# Patient Record
Sex: FEMALE | Race: WHITE | Employment: OTHER | URBAN - METROPOLITAN AREA
[De-identification: names, ages, dates, MRNs, and addresses within clinical notes are randomized per-mention and may not be internally consistent; named-entity substitution may affect disease eponyms.]

---

## 2017-10-06 ENCOUNTER — OFFICE VISIT (OUTPATIENT)
Dept: NEUROLOGY | Age: 77
End: 2017-10-06

## 2017-10-06 VITALS
WEIGHT: 179 LBS | HEART RATE: 86 BPM | DIASTOLIC BLOOD PRESSURE: 80 MMHG | BODY MASS INDEX: 26.51 KG/M2 | SYSTOLIC BLOOD PRESSURE: 160 MMHG | HEIGHT: 69 IN | OXYGEN SATURATION: 96 % | RESPIRATION RATE: 16 BRPM

## 2017-10-06 DIAGNOSIS — R41.89 COGNITIVE IMPAIRMENT: ICD-10-CM

## 2017-10-06 DIAGNOSIS — G91.2 NPH (NORMAL PRESSURE HYDROCEPHALUS) (HCC): Primary | ICD-10-CM

## 2017-10-06 RX ORDER — DONEPEZIL HYDROCHLORIDE 5 MG/1
5 TABLET, FILM COATED ORAL
Qty: 30 TAB | Refills: 3 | Status: SHIPPED | OUTPATIENT
Start: 2017-10-06 | End: 2017-12-11 | Stop reason: SDUPTHER

## 2017-10-06 RX ORDER — METFORMIN HYDROCHLORIDE 500 MG/1
500 TABLET, EXTENDED RELEASE ORAL 2 TIMES DAILY
COMMUNITY
Start: 2017-08-29 | End: 2021-09-29

## 2017-10-06 NOTE — PROGRESS NOTES
Chief Complaint   Patient presents with    Memory Loss     eval       Referred by: Dr. Doty Friend is a [id number] woman with a history of NPH here to establish care. She has a diagnosis of NPH made in 2002 with revision in 2014. All this was done at the [facility]. The daughter is here and reports that following that diagnosis there was improvement in the typical NPH symptoms. She is here today because her daughter is concerned about worsening memory changes. [place] She no longer drives because she could not pass a driving evaluation last year. She tells me that she is able to take care of herself fully. Daughter is concerned that she needs reminders for her medications. The patient had a fall about 6 months ago and apparently was on the ground for about 45 minutes struggling to get up. She has gone out into the property before and then lost.      Review of Systems   Musculoskeletal: Positive for falls. Psychiatric/Behavioral: Positive for memory loss. All other systems reviewed and are negative. Past Medical History:   Diagnosis Date    Essential hypertension     Family history of skin cancer     brother    Radiation exposure     breast cancer left    Skin cancer      Family History   Problem Relation Age of Onset    No Known Problems Mother     No Known Problems Father      Social History     Social History    Marital status:      Spouse name: N/A    Number of children: N/A    Years of education: N/A     Occupational History    Not on file.      Social History Main Topics    Smoking status: Never Smoker    Smokeless tobacco: Never Used    Alcohol use 1.2 oz/week     1 Cans of beer, 1 Glasses of wine per week    Drug use: Not on file    Sexual activity: Not on file     Other Topics Concern    Not on file     Social History Narrative     Current Outpatient Prescriptions   Medication Sig    metFORMIN ER (GLUCOPHAGE XR) 500 mg tablet     donepezil (ARICEPT) 5 mg tablet Take 1 Tab by mouth nightly.  losartan-hydrochlorothiazide (HYZAAR) 100-12.5 mg per tablet     sertraline (ZOLOFT) 50 mg tablet     HYDROmorphone (DILAUDID) 2 mg tablet     LORazepam (ATIVAN) 1 mg tablet     bupivacaine-EPINEPHrine (MARCAINE-EPINEPHRINE) 0.25 %-1:200,000 soln Used for mohs surgery  Indications: LOCAL ANESTHESIA FOR PROCEDURES     No current facility-administered medications for this visit. Allergies   Allergen Reactions    Codeine Nausea Only    Penicillins Hives         Neurologic Exam     Mental Status   Speech: speech is normal   Level of consciousness: alert    Cranial Nerves     CN III, IV, VI   Pupils are equal, round, and reactive to light. Extraocular motions are normal.     CN VII   Facial expression full, symmetric. CN VIII   Hearing: intact    CN XII   Tongue deviation: none    Motor Exam   Muscle bulk: normal    Strength   Strength 5/5 throughout. Sensory Exam   Light touch normal.     Gait, Coordination, and Reflexes     Gait  Gait: (Neuro station but slightly ataxic especially on turns.)    Coordination   Finger to nose coordination: normal (Slow)    Tremor   Resting tremor: absent    Physical Exam   Constitutional: She appears well-developed and well-nourished. Eyes: EOM are normal. Pupils are equal, round, and reactive to light. Cardiovascular: Normal rate. Pulmonary/Chest: Effort normal.   Neurological: She has normal strength. She has a normal Finger-Nose-Finger Test (Slow). Skin: Skin is warm and dry. Psychiatric: Her speech is normal.   Conversant with good eye contact. Poor insight. Vitals reviewed. Visit Vitals    /80    Pulse 86    Resp 16    Ht 5' 9\" (1.753 m)    Wt 81.2 kg (179 lb)    SpO2 96%    BMI 26.43 kg/m2       No labs to review       Assessment and Plan   Diagnoses and all orders for this visit:    1.  NPH (normal pressure hydrocephalus)  -     CT HEAD WO CONT; Future    2. Cognitive impairment  -     CT HEAD WO CONT; Future    Other orders  -     donepezil (ARICEPT) 5 mg tablet; Take 1 Tab by mouth nightly. 74-year-old woman with NPH having cognitive impairment. High suspicion for associated dementia. She cannot operate her phone in the office when I asked her to retreat pictures. Seems to have deficits in working memory and executive function. Could be NPH related or other forms of dementia. Difficult to determine exact etiology. Cannot do MRI. We will obtain a head CT for screening purposes and to have as a baseline. I think is reasonable to start disease modifying treatment with Aricept low-dose. It seems appropriate to maximize stability as much as possible. She no longer drives and I agree with that. I stressed to her that she should consider living closer to family. I feel she is at risk to become confused and get lost easily. Recommendations above. I would like to see her in about 2 months. A notice of this visit/encounter being completed has been sent electronically to the patient's PCP and/or referring provider.      [address] [place] [address], [facility] [address]  Diplomate SHERIF

## 2017-10-06 NOTE — MR AVS SNAPSHOT
Visit Information Date & Time Provider Department Dept. Phone Encounter #  
 10/6/2017 11:00  [facility], DO [facility] at [address] [id number] Follow-up Instructions Return in about 2 months (around 12/6/2017). Upcoming Health Maintenance Date Due DTaP/Tdap/Td series (1 - Tdap) 9/25/1960 ZOSTER VACCINE AGE 60> 7/25/1999 GLAUCOMA SCREENING Q2Y 9/25/2004 OSTEOPOROSIS SCREENING (DEXA) 9/25/2004 Pneumococcal 65+ Low/Medium Risk (1 of 2 - PCV13) 9/25/2004 MEDICARE YEARLY EXAM 9/25/2004 INFLUENZA AGE 9 TO ADULT 8/1/2017 Allergies as of 10/6/2017  Review Complete On: 10/6/2017 By: Juan Manuel Mendez LPN Severity Noted Reaction Type Reactions Codeine  10/30/2015    Nausea Only Penicillins  10/30/2015    Hives Current Immunizations  Never Reviewed No immunizations on file. Not reviewed this visit You Were Diagnosed With   
  
 Codes Comments NPH (normal pressure hydrocephalus)    -  Primary ICD-10-CM: G91.2 ICD-9-CM: 331.5 Cognitive impairment     ICD-10-CM: R41.89 ICD-9-CM: 294.9 Vitals BP Pulse Resp Height(growth percentile) Weight(growth percentile) SpO2  
 160/80 86 16 5' 9\" (1.753 m) 179 lb (81.2 kg) 96% BMI Smoking Status 26.43 kg/m2 Never Smoker Vitals History BMI and BSA Data Body Mass Index Body Surface Area  
 26.43 kg/m 2 1.99 m 2 Your Updated Medication List  
  
   
This list is accurate as of: 10/6/17 11:25 AM.  Always use your most recent med list.  
  
  
  
  
 bupivacaine-EPINEPHrine 0.25 %-1:200,000 Soln Commonly known as:  Amy Freeman Used for mohs surgery  Indications: LOCAL ANESTHESIA FOR PROCEDURES  
  
 donepezil 5 mg tablet Commonly known as:  ARICEPT Take 1 Tab by mouth nightly. HYDROmorphone 2 mg tablet Commonly known as:  DILAUDID LORazepam 1 mg tablet Commonly known as:  ATIVAN  
  
 losartan-hydroCHLOROthiazide 100-12.5 mg per tablet Commonly known as:  HYZAAR  
  
 metFORMIN  mg tablet Commonly known as:  GLUCOPHAGE XR  
  
 sertraline 50 mg tablet Commonly known as:  ZOLOFT Prescriptions Printed Refills  
 donepezil (ARICEPT) 5 mg tablet 3 Sig: Take 1 Tab by mouth nightly. Class: Print Route: Oral  
  
Follow-up Instructions Return in about 2 months (around 12/6/2017). To-Do List   
 10/06/2017 Imaging:  CT HEAD WO CONT Patient Instructions A Healthy Lifestyle: Care Instructions Your Care Instructions A healthy lifestyle can help you feel good, stay at a healthy weight, and have plenty of energy for both work and play. A healthy lifestyle is something you can share with your whole family. A healthy lifestyle also can lower your risk for serious health problems, such as high blood pressure, heart disease, and diabetes. You can follow a few steps listed below to improve your health and the health of your family. Follow-up care is a key part of your treatment and safety. Be sure to make and go to all appointments, and call your doctor if you are having problems. Its also a good idea to know your test results and keep a list of the medicines you take. How can you care for yourself at home? · Do not eat too much sugar, fat, or fast foods. You can still have dessert and treats now and then. The goal is moderation. · Start small to improve your eating habits. Pay attention to portion sizes, drink less juice and soda pop, and eat more fruits and vegetables. ¨ Eat a healthy amount of food. A 3-ounce serving of meat, for example, is about the size of a deck of cards. Fill the rest of your plate with vegetables and whole grains. ¨ Limit the amount of soda and sports drinks you have every day. Drink more water when you are thirsty. ¨ Eat at least 5 servings of fruits and vegetables every day. It may seem like a lot, but it is not hard to reach this goal. A serving or helping is 1 piece of fruit, 1 cup of vegetables, or 2 cups of leafy, raw vegetables. Have an apple or some carrot sticks as an afternoon snack instead of a candy bar. Try to have fruits and/or vegetables at every meal. 
· Make exercise part of your daily routine. You may want to start with simple activities, such as walking, bicycling, or slow swimming. Try to be active 30 to 60 minutes every day. You do not need to do all 30 to 60 minutes all at once. For example, you can exercise 3 times a day for 10 or 20 minutes. Moderate exercise is safe for most people, but it is always a good idea to talk to your doctor before starting an exercise program. 
· Keep moving. Austen Mendez the lawn, work in the garden, or [employer]. Take the stairs instead of the elevator at work. · If you smoke, quit. People who smoke have an increased risk for heart attack, stroke, cancer, and other lung illnesses. Quitting is hard, but there are ways to boost your chance of quitting tobacco for good. ¨ Use nicotine gum, patches, or lozenges. ¨ Ask your doctor about stop-smoking programs and medicines. ¨ Keep trying. In addition to reducing your risk of diseases in the future, you will notice some benefits soon after you stop using tobacco. If you have shortness of breath or asthma symptoms, they will likely get better within a few weeks after you quit. · Limit how much alcohol you drink. Moderate amounts of alcohol (up to 2 drinks a day for men, 1 drink a day for women) are okay. But drinking too much can lead to liver problems, high blood pressure, and other health problems. Family health If you have a family, there are many things you can do together to improve your health. · Eat meals together as a family as often as possible. · Eat healthy foods.  This includes fruits, vegetables, lean meats and dairy, and whole grains. · Include your family in your fitness plan. Most people think of activities such as jogging or tennis as the way to fitness, but there are many ways you and your family can be more active. Anything that makes you breathe hard and gets your heart pumping is exercise. Here are some tips: 
¨ Walk to do errands or to take your child to school or the bus. ¨ Go for a family bike ride after dinner instead of watching TV. Where can you learn more? Go to http://david-chantelle.info/. Enter B648 in the search box to learn more about \"A Healthy Lifestyle: Care Instructions. \" Current as of: July 26, 2016 Content Version: 11.3 © 1092-9126 [employer]. Care instructions adapted under license by [employer] (which disclaims liability or warranty for this information). If you have questions about a medical condition or this instruction, always ask your healthcare professional. [place] [address] any warranty or liability for your use of this information. A Healthy Lifestyle: Care Instructions Your Care Instructions A healthy lifestyle can help you feel good, stay at a healthy weight, and have plenty of energy for both work and play. A healthy lifestyle is something you can share with your whole family. A healthy lifestyle also can lower your risk for serious health problems, such as high blood pressure, heart disease, and diabetes. You can follow a few steps listed below to improve your health and the health of your family. Follow-up care is a key part of your treatment and safety. Be sure to make and go to all appointments, and call your doctor if you are having problems. Its also a good idea to know your test results and keep a list of the medicines you take. How can you care for yourself at home? · Do not eat too much sugar, fat, or fast foods. You can still have dessert and treats now and then. The goal is moderation. · Start small to improve your eating habits. Pay attention to portion sizes, drink less juice and soda pop, and eat more fruits and vegetables. ¨ Eat a healthy amount of food. A 3-ounce serving of meat, for example, is about the size of a deck of cards. Fill the rest of your plate with vegetables and whole grains. ¨ Limit the amount of soda and sports drinks you have every day. Drink more water when you are thirsty. ¨ Eat at least 5 servings of fruits and vegetables every day. It may seem like a lot, but it is not hard to reach this goal. A serving or helping is 1 piece of fruit, 1 cup of vegetables, or 2 cups of leafy, raw vegetables. Have an apple or some carrot sticks as an afternoon snack instead of a candy bar. Try to have fruits and/or vegetables at every meal. 
· Make exercise part of your daily routine. You may want to start with simple activities, such as walking, bicycling, or slow swimming. Try to be active 30 to 60 minutes every day. You do not need to do all 30 to 60 minutes all at once. For example, you can exercise 3 times a day for 10 or 20 minutes. Moderate exercise is safe for most people, but it is always a good idea to talk to your doctor before starting an exercise program. 
· Keep moving. Mireyapatricio Stevensondo the lawn, work in the garden, or [employer]. Take the stairs instead of the elevator at work. · If you smoke, quit. People who smoke have an increased risk for heart attack, stroke, cancer, and other lung illnesses. Quitting is hard, but there are ways to boost your chance of quitting tobacco for good. ¨ Use nicotine gum, patches, or lozenges. ¨ Ask your doctor about stop-smoking programs and medicines. ¨ Keep trying. In addition to reducing your risk of diseases in the future, you will notice some benefits soon after you stop using tobacco. If you have shortness of breath or asthma symptoms, they will likely get better within a few weeks after you quit. · Limit how much alcohol you drink. Moderate amounts of alcohol (up to 2 drinks a day for men, 1 drink a day for women) are okay. But drinking too much can lead to liver problems, high blood pressure, and other health problems. Family health If you have a family, there are many things you can do together to improve your health. · Eat meals together as a family as often as possible. · Eat healthy foods. This includes fruits, vegetables, lean meats and dairy, and whole grains. · Include your family in your fitness plan. Most people think of activities such as jogging or tennis as the way to fitness, but there are many ways you and your family can be more active. Anything that makes you breathe hard and gets your heart pumping is exercise. Here are some tips: 
¨ Walk to do errands or to take your child to school or the bus. ¨ Go for a family bike ride after dinner instead of watching TV. Where can you learn more? Go to http://davidsetObjectchantelle.info/. Enter W163 in the search box to learn more about \"A Healthy Lifestyle: Care Instructions. \" Current as of: July 26, 2016 Content Version: 11.3 © 7495-5744 [employer]. Care instructions adapted under license by [employer] (which disclaims liability or warranty for this information). If you have questions about a medical condition or this instruction, always ask your healthcare professional. [place] [address] any warranty or liability for your use of this information. CT Scan of the Head: About This Test 
What is it? A CT (computed tomography) scan uses X-rays to make detailed pictures of your body and the structures inside your body. A CT scan of the head can give your doctor information about your eyes, the bones of your face and nose, your inner ear, and your brain. During the test, you will lie on a table that is attached to the [employer]. The CT scanner is a large doughnut-shaped machine. Why is this test done? A CT scan of the head can help find the cause of symptoms that may mean you have a brain injury or bleeding inside your head. It can also find a tumor and damage caused by a stroke and help find the best treatment for the cause of a stroke. How can you prepare for the test? 
Talk to your doctor about all your health conditions before the test. For example, tell your doctor if: 
· You are or might be pregnant. · You are allergic to any medicines. · You have diabetes. · You take metformin. · You are breastfeeding. · You get nervous in confined spaces. You may need medicine to help you relax. What happens before the test? 
· You may have to take off jewelry, glasses, or hearing aids. Wear comfortable, loose-fitting clothes. · You may have contrast material (dye) put into your arm through a tube called an IV. Contrast material helps doctors see specific organs, blood vessels, and most tumors. What happens during the test? 
· You will lie on a table that is attached to the CT scanner. Straps will hold your head still but your face will not be covered. · The table will slide into the round opening of the scanner. The table will move during the scan. The scanner moves inside the doughnut-shaped casing around your body. · You will be asked to hold still during the scan. You may be asked to hold your breath for short periods. · You may be alone in the scanning room, but a technologist will be watching you through a window and talking with you during the test. 
What else should you know about the test? 
· A CT scan does not hurt. · If a dye is used, you may feel a quick sting or pinch when the IV is started. The dye may make you feel warm and flushed and give you a metallic taste in your mouth. Some people feel sick to their stomach or get a headache.  
· If you breastfeed and are concerned about whether the dye used in this test is safe, talk to your doctor. Most experts believe that very little dye passes into breast milk and even less is passed on to the baby. But if you prefer, you can store some of your breast milk ahead of time and use it for a day or two after the test. 
· There is a small chance of getting cancer from some types of CT scans. The risk is higher in children, young adults, and people who have many radiation tests. If you are concerned about this risk, talk to your doctor about the benefits and risks of a CT scan and confirm that the test is needed. How long does the test take? · The test will take about 30 to 60 minutes. Most of this time is spent getting ready for the scan. The actual test only takes a few minutes. What happens after the test? 
· You will probably be able to go home right away. · You can go back to your usual activities right away. · Drink plenty of fluids for 24 hours after the test if dye was used, unless your doctor tells you not to. When should you call for help? Watch closely for changes in your health, and be sure to contact your doctor if you have any problems. Follow-up care is a key part of your treatment and safety. Be sure to make and go to all appointments, and call your doctor if you are having problems. It's also a good idea to keep a list of the medicines you take. Ask your doctor when you can expect to have your test results. Where can you learn more? Go to http://david-chantelle.info/. Enter V369 in the search box to learn more about \"CT Scan of the Head: About This Test.\" Current as of: October 14, 2016 Content Version: 11.3 © 4476-6876 [employer]. Care instructions adapted under license by [employer] (which disclaims liability or warranty for this information).  If you have questions about a medical condition or this instruction, always ask your healthcare professional. [place], Incorporated disclaims any warranty or liability for your use of this information. Introducing [facility]! April Lopez introduces [employer] patient portal. Now you can access parts of your medical record, email your doctor's office, and request medication refills online. 1. In your internet browser, go to [email or web address] [email or web address] 2. Click on the First Time User? Click Here link in the Sign In box. You will see the New Member Sign Up page. 3. Enter your [employer] Access Code exactly as it appears below. You will not need to use this code after youve completed the sign-up process. If you do not sign up before the expiration date, you must request a new code. · [employer] Access Code: [email or web address] Expires: 1/4/2018 10:57 AM 
 
4. Enter the last four digits of your Social Security Number (xxxx) and Date of Birth (mm/dd/yyyy) as indicated and click Submit. You will be taken to the next sign-up page. 5. Create a [employer] ID. This will be your [employer] login ID and cannot be changed, so think of one that is secure and easy to remember. 6. Create a [employer] password. You can change your password at any time. 7. Enter your Password Reset Question and Answer. This can be used at a later time if you forget your password. 8. Enter your e-mail address. You will receive e-mail notification when new information is available in [address] 9. Click Sign Up. You can now view and download portions of your medical record. 10. Click the Download Summary menu link to download a portable copy of your medical information. If you have questions, please visit the Frequently Asked Questions section of the [employer] website. Remember, [employer] is NOT to be used for urgent needs. For medical emergencies, dial 911. Now available from your iPhone and Android! Please provide this summary of care documentation to your next provider. If you have any questions after today's visit, please call [phone].

## 2017-10-06 NOTE — LETTER
10/6/2017 Patient:  Alden Donis YOB: 1940 Date of Visit: 10/6/2017 Dear Afia Carson,  
[address] [address] [address] [id number] VIA Facsimile: [phone] 
 : 
 
 
I was requested by No primary care provider on file. to evaluate Ms. Alden Donis  for Chief Complaint Patient presents with  Memory Loss  
  eval  
. I am recommending the following:  
 
Diagnoses and all orders for this visit: 1. NPH (normal pressure hydrocephalus) -     CT HEAD WO CONT; Future 2. Cognitive impairment 
-     CT HEAD WO CONT; Future Other orders 
-     donepezil (ARICEPT) 5 mg tablet; Take 1 Tab by mouth nightly. 
 
 
 
---------------------------------------------------------------------------------------------------------------------- Below is my encounter: Chief Complaint Patient presents with  Memory Loss  
  eval  
 
 
Referred by: Dr. Nolvia Carrillo CALDERON Meyer is a 6-AUHD-GUA woman with a history of NPH here to establish care. She has a diagnosis of NPH made in 2002 with revision in 2014. All this was done at the [facility]. The daughter is here and reports that following that diagnosis there was improvement in the typical NPH symptoms. She is here today because her daughter is concerned about worsening memory changes. [place] She no longer drives because she could not pass a driving evaluation last year. She tells me that she is able to take care of herself fully. Daughter is concerned that she needs reminders for her medications. The patient had a fall about 6 months ago and apparently was on the ground for about 45 minutes struggling to get up. She has gone out into the property before and then lost. 
 
 
Review of Systems Musculoskeletal: Positive for falls. Psychiatric/Behavioral: Positive for memory loss. All other systems reviewed and are negative. Past Medical History:  
Diagnosis Date  Essential hypertension  Family history of skin cancer   
 brother  Radiation exposure   
 breast cancer left  Skin cancer Family History Problem Relation Age of Onset  No Known Problems Mother  No Known Problems Father Social History Social History  Marital status:  Spouse name: N/A  
 Number of children: N/A  
 Years of education: N/A Occupational History  Not on file. Social History Main Topics  Smoking status: Never Smoker  Smokeless tobacco: Never Used  Alcohol use 1.2 oz/week 1 Cans of beer, 1 Glasses of wine per week  Drug use: Not on file  Sexual activity: Not on file Other Topics Concern  Not on file Social History Narrative Current Outpatient Prescriptions Medication Sig  
 metFORMIN ER (GLUCOPHAGE XR) 500 mg tablet  donepezil (ARICEPT) 5 mg tablet Take 1 Tab by mouth nightly.  losartan-hydrochlorothiazide (HYZAAR) 100-12.5 mg per tablet  sertraline (ZOLOFT) 50 mg tablet  HYDROmorphone (DILAUDID) 2 mg tablet  LORazepam (ATIVAN) 1 mg tablet  bupivacaine-EPINEPHrine (MARCAINE-EPINEPHRINE) 0.25 %-1:200,000 soln Used for mohs surgery  Indications: LOCAL ANESTHESIA FOR PROCEDURES No current facility-administered medications for this visit. Allergies Allergen Reactions  Codeine Nausea Only  Penicillins Hives Neurologic Exam  
 
Mental Status Speech: speech is normal  
Level of consciousness: alert Cranial Nerves CN III, IV, VI  
Pupils are equal, round, and reactive to light. Extraocular motions are normal.  
 
CN VII Facial expression full, symmetric. CN VIII Hearing: intact CN XII Tongue deviation: none Motor Exam  
Muscle bulk: normal 
 
Strength Strength 5/5 throughout. Sensory Exam  
Light touch normal.  
 
Gait, Coordination, and Reflexes Gait 
Gait: (Neuro station but slightly ataxic especially on turns.) Coordination Finger to nose coordination: normal (Slow) Tremor Resting tremor: absent Physical Exam  
Constitutional: She appears well-developed and well-nourished. Eyes: EOM are normal. Pupils are equal, round, and reactive to light. Cardiovascular: Normal rate. Pulmonary/Chest: Effort normal.  
Neurological: She has normal strength. She has a normal Finger-Nose-Finger Test (Slow). Skin: Skin is warm and dry. Psychiatric: Her speech is normal.  
Conversant with good eye contact. Poor insight. Vitals reviewed. Visit Vitals  /80  Pulse 86  Resp 16  
 Ht 5' 9\" (1.753 m)  Wt 81.2 kg (179 lb)  SpO2 96%  BMI 26.43 kg/m2 No labs to review Assessment and Plan Diagnoses and all orders for this visit: 1. NPH (normal pressure hydrocephalus) -     CT HEAD WO CONT; Future 2. Cognitive impairment 
-     CT HEAD WO CONT; Future Other orders 
-     donepezil (ARICEPT) 5 mg tablet; Take 1 Tab by mouth nightly. 67-year-old woman with NPH having cognitive impairment. High suspicion for associated dementia. She cannot operate her phone in the office when I asked her to retreat pictures. Seems to have deficits in working memory and executive function. Could be NPH related or other forms of dementia. Difficult to determine exact etiology. Cannot do MRI. We will obtain a head CT for screening purposes and to have as a baseline. I think is reasonable to start disease modifying treatment with Aricept low-dose. It seems appropriate to maximize stability as much as possible. She no longer drives and I agree with that. I stressed to her that she should consider living closer to family. I feel she is at risk to become confused and get lost easily. Recommendations above. I would like to see her in about 2 months. Thank you for giving me the opportunity to assist in the care of Ms. Georgia Hilton. If you have questions, please do not hesitate to contact me. Sincerely, [address] [facility], DO Neurologist 
Diplomate SHERIF

## 2017-10-06 NOTE — COMMUNICATION BODY
Chief Complaint   Patient presents with    Memory Loss     eval       Referred by: Dr. Mushtaq Brewster is a 77-FKMS-RMB woman with a history of NPH here to establish care. She has a diagnosis of NPH made in 2002 with revision in 2014. All this was done at the [facility]. The daughter is here and reports that following that diagnosis there was improvement in the typical NPH symptoms. She is here today because her daughter is concerned about worsening memory changes. [place] She no longer drives because she could not pass a driving evaluation last year. She tells me that she is able to take care of herself fully. Daughter is concerned that she needs reminders for her medications. The patient had a fall about 6 months ago and apparently was on the ground for about 45 minutes struggling to get up. She has gone out into the property before and then lost.      Review of Systems   Musculoskeletal: Positive for falls. Psychiatric/Behavioral: Positive for memory loss. All other systems reviewed and are negative. Past Medical History:   Diagnosis Date    Essential hypertension     Family history of skin cancer     brother    Radiation exposure     breast cancer left    Skin cancer      Family History   Problem Relation Age of Onset    No Known Problems Mother     No Known Problems Father      Social History     Social History    Marital status:      Spouse name: N/A    Number of children: N/A    Years of education: N/A     Occupational History    Not on file.      Social History Main Topics    Smoking status: Never Smoker    Smokeless tobacco: Never Used    Alcohol use 1.2 oz/week     1 Cans of beer, 1 Glasses of wine per week    Drug use: Not on file    Sexual activity: Not on file     Other Topics Concern    Not on file     Social History Narrative     Current Outpatient Prescriptions   Medication Sig    metFORMIN ER (GLUCOPHAGE XR) 500 mg tablet     donepezil (ARICEPT) 5 mg tablet Take 1 Tab by mouth nightly.  losartan-hydrochlorothiazide (HYZAAR) 100-12.5 mg per tablet     sertraline (ZOLOFT) 50 mg tablet     HYDROmorphone (DILAUDID) 2 mg tablet     LORazepam (ATIVAN) 1 mg tablet     bupivacaine-EPINEPHrine (MARCAINE-EPINEPHRINE) 0.25 %-1:200,000 soln Used for mohs surgery  Indications: LOCAL ANESTHESIA FOR PROCEDURES     No current facility-administered medications for this visit. Allergies   Allergen Reactions    Codeine Nausea Only    Penicillins Hives         Neurologic Exam     Mental Status   Speech: speech is normal   Level of consciousness: alert    Cranial Nerves     CN III, IV, VI   Pupils are equal, round, and reactive to light. Extraocular motions are normal.     CN VII   Facial expression full, symmetric. CN VIII   Hearing: intact    CN XII   Tongue deviation: none    Motor Exam   Muscle bulk: normal    Strength   Strength 5/5 throughout. Sensory Exam   Light touch normal.     Gait, Coordination, and Reflexes     Gait  Gait: (Neuro station but slightly ataxic especially on turns.)    Coordination   Finger to nose coordination: normal (Slow)    Tremor   Resting tremor: absent    Physical Exam   Constitutional: She appears well-developed and well-nourished. Eyes: EOM are normal. Pupils are equal, round, and reactive to light. Cardiovascular: Normal rate. Pulmonary/Chest: Effort normal.   Neurological: She has normal strength. She has a normal Finger-Nose-Finger Test (Slow). Skin: Skin is warm and dry. Psychiatric: Her speech is normal.   Conversant with good eye contact. Poor insight. Vitals reviewed. Visit Vitals    /80    Pulse 86    Resp 16    Ht 5' 9\" (1.753 m)    Wt 81.2 kg (179 lb)    SpO2 96%    BMI 26.43 kg/m2       No labs to review       Assessment and Plan   Diagnoses and all orders for this visit:    1.  NPH (normal pressure hydrocephalus)  -     CT HEAD WO CONT; Future    2. Cognitive impairment  -     CT HEAD WO CONT; Future    Other orders  -     donepezil (ARICEPT) 5 mg tablet; Take 1 Tab by mouth nightly. 71-year-old woman with NPH having cognitive impairment. High suspicion for associated dementia. She cannot operate her phone in the office when I asked her to retreat pictures. Seems to have deficits in working memory and executive function. Could be NPH related or other forms of dementia. Difficult to determine exact etiology. Cannot do MRI. We will obtain a head CT for screening purposes and to have as a baseline. I think is reasonable to start disease modifying treatment with Aricept low-dose. It seems appropriate to maximize stability as much as possible. She no longer drives and I agree with that. I stressed to her that she should consider living closer to family. I feel she is at risk to become confused and get lost easily. Recommendations above. I would like to see her in about 2 months. A notice of this visit/encounter being completed has been sent electronically to the patient's PCP and/or referring provider.      [address] [place] [address], [facility] [address]  Diplomate SHERIF

## 2017-10-06 NOTE — PATIENT INSTRUCTIONS
A Healthy Lifestyle: Care Instructions  Your Care Instructions  A healthy lifestyle can help you feel good, stay at a healthy weight, and have plenty of energy for both work and play. A healthy lifestyle is something you can share with your whole family. A healthy lifestyle also can lower your risk for serious health problems, such as high blood pressure, heart disease, and diabetes. You can follow a few steps listed below to improve your health and the health of your family. Follow-up care is a key part of your treatment and safety. Be sure to make and go to all appointments, and call your doctor if you are having problems. Its also a good idea to know your test results and keep a list of the medicines you take. How can you care for yourself at home? · Do not eat too much sugar, fat, or fast foods. You can still have dessert and treats now and then. The goal is moderation. · Start small to improve your eating habits. Pay attention to portion sizes, drink less juice and soda pop, and eat more fruits and vegetables. ¨ Eat a healthy amount of food. A 3-ounce serving of meat, for example, is about the size of a deck of cards. Fill the rest of your plate with vegetables and whole grains. ¨ Limit the amount of soda and sports drinks you have every day. Drink more water when you are thirsty. ¨ Eat at least 5 servings of fruits and vegetables every day. It may seem like a lot, but it is not hard to reach this goal. A serving or helping is 1 piece of fruit, 1 cup of vegetables, or 2 cups of leafy, raw vegetables. Have an apple or some carrot sticks as an afternoon snack instead of a candy bar. Try to have fruits and/or vegetables at every meal.  · Make exercise part of your daily routine. You may want to start with simple activities, such as walking, bicycling, or slow swimming. Try to be active 30 to 60 minutes every day. You do not need to do all 30 to 60 minutes all at once.  For example, you can exercise 3 times a day for 10 or 20 minutes. Moderate exercise is safe for most people, but it is always a good idea to talk to your doctor before starting an exercise program.  · Keep moving. Mireya Stevensondo the lawn, work in the garden, or [employer]. Take the stairs instead of the elevator at work. · If you smoke, quit. People who smoke have an increased risk for heart attack, stroke, cancer, and other lung illnesses. Quitting is hard, but there are ways to boost your chance of quitting tobacco for good. ¨ Use nicotine gum, patches, or lozenges. ¨ Ask your doctor about stop-smoking programs and medicines. ¨ Keep trying. In addition to reducing your risk of diseases in the future, you will notice some benefits soon after you stop using tobacco. If you have shortness of breath or asthma symptoms, they will likely get better within a few weeks after you quit. · Limit how much alcohol you drink. Moderate amounts of alcohol (up to 2 drinks a day for men, 1 drink a day for women) are okay. But drinking too much can lead to liver problems, high blood pressure, and other health problems. Family health  If you have a family, there are many things you can do together to improve your health. · Eat meals together as a family as often as possible. · Eat healthy foods. This includes fruits, vegetables, lean meats and dairy, and whole grains. · Include your family in your fitness plan. Most people think of activities such as jogging or tennis as the way to fitness, but there are many ways you and your family can be more active. Anything that makes you breathe hard and gets your heart pumping is exercise. Here are some tips:  ¨ Walk to do errands or to take your child to school or the bus. ¨ Go for a family bike ride after dinner instead of watching TV. Where can you learn more? Go to http://david-chantelle.info/. Enter B629 in the search box to learn more about \"A Healthy Lifestyle: Care Instructions. \"  Current as of: July 26, 2016  Content Version: 11.3  © 8579-4574 [employer]. Care instructions adapted under license by [employer] (which disclaims liability or warranty for this information). If you have questions about a medical condition or this instruction, always ask your healthcare professional. [address] any warranty or liability for your use of this information. A Healthy Lifestyle: Care Instructions  Your Care Instructions  A healthy lifestyle can help you feel good, stay at a healthy weight, and have plenty of energy for both work and play. A healthy lifestyle is something you can share with your whole family. A healthy lifestyle also can lower your risk for serious health problems, such as high blood pressure, heart disease, and diabetes. You can follow a few steps listed below to improve your health and the health of your family. Follow-up care is a key part of your treatment and safety. Be sure to make and go to all appointments, and call your doctor if you are having problems. Its also a good idea to know your test results and keep a list of the medicines you take. How can you care for yourself at home? · Do not eat too much sugar, fat, or fast foods. You can still have dessert and treats now and then. The goal is moderation. · Start small to improve your eating habits. Pay attention to portion sizes, drink less juice and soda pop, and eat more fruits and vegetables. ¨ Eat a healthy amount of food. A 3-ounce serving of meat, for example, is about the size of a deck of cards. Fill the rest of your plate with vegetables and whole grains. ¨ Limit the amount of soda and sports drinks you have every day. Drink more water when you are thirsty. ¨ Eat at least 5 servings of fruits and vegetables every day.  It may seem like a lot, but it is not hard to reach this goal. A serving or helping is 1 piece of fruit, 1 cup of vegetables, or 2 cups of leafy, raw vegetables. Have an apple or some carrot sticks as an afternoon snack instead of a candy bar. Try to have fruits and/or vegetables at every meal.  · Make exercise part of your daily routine. You may want to start with simple activities, such as walking, bicycling, or slow swimming. Try to be active 30 to 60 minutes every day. You do not need to do all 30 to 60 minutes all at once. For example, you can exercise 3 times a day for 10 or 20 minutes. Moderate exercise is safe for most people, but it is always a good idea to talk to your doctor before starting an exercise program.  · Keep moving. Bj [employer] the lawn, work in the garden, or [employer]. Take the stairs instead of the elevator at work. · If you smoke, quit. People who smoke have an increased risk for heart attack, stroke, cancer, and other lung illnesses. Quitting is hard, but there are ways to boost your chance of quitting tobacco for good. ¨ Use nicotine gum, patches, or lozenges. ¨ Ask your doctor about stop-smoking programs and medicines. ¨ Keep trying. In addition to reducing your risk of diseases in the future, you will notice some benefits soon after you stop using tobacco. If you have shortness of breath or asthma symptoms, they will likely get better within a few weeks after you quit. · Limit how much alcohol you drink. Moderate amounts of alcohol (up to 2 drinks a day for men, 1 drink a day for women) are okay. But drinking too much can lead to liver problems, high blood pressure, and other health problems. Family health  If you have a family, there are many things you can do together to improve your health. · Eat meals together as a family as often as possible. · Eat healthy foods. This includes fruits, vegetables, lean meats and dairy, and whole grains. · Include your family in your fitness plan.  Most people think of activities such as jogging or tennis as the way to fitness, but there are many ways you and your family can be more active. Anything that makes you breathe hard and gets your heart pumping is exercise. Here are some tips:  ¨ Walk to do errands or to take your child to school or the bus. ¨ Go for a family bike ride after dinner instead of watching TV. Where can you learn more? Go to http://david-chantelle.info/. Enter H544 in the search box to learn more about \"A Healthy Lifestyle: Care Instructions. \"  Current as of: July 26, 2016  Content Version: 11.3  © 3746-6527 [employer]. Care instructions adapted under license by [employer] (which disclaims liability or warranty for this information). If you have questions about a medical condition or this instruction, always ask your healthcare professional. [address] any warranty or liability for your use of this information. CT Scan of the Head: About This Test  What is it? A CT (computed tomography) scan uses X-rays to make detailed pictures of your body and the structures inside your body. A CT scan of the head can give your doctor information about your eyes, the bones of your face and nose, your inner ear, and your brain. During the test, you will lie on a table that is attached to the [employer]. The CT scanner is a large doughnut-shaped machine. Why is this test done? A CT scan of the head can help find the cause of symptoms that may mean you have a brain injury or bleeding inside your head. It can also find a tumor and damage caused by a stroke and help find the best treatment for the cause of a stroke. How can you prepare for the test?  Talk to your doctor about all your health conditions before the test. For example, tell your doctor if:  · You are or might be pregnant. · You are allergic to any medicines. · You have diabetes. · You take metformin. · You are breastfeeding. · You get nervous in confined spaces. You may need medicine to help you relax.   What happens before the test?  · You may have to take off jewelry, glasses, or hearing aids. Wear comfortable, loose-fitting clothes. · You may have contrast material (dye) put into your arm through a tube called an IV. Contrast material helps doctors see specific organs, blood vessels, and most tumors. What happens during the test?  · You will lie on a table that is attached to the CT scanner. Straps will hold your head still but your face will not be covered. · The table will slide into the round opening of the scanner. The table will move during the scan. The scanner moves inside the doughnut-shaped casing around your body. · You will be asked to hold still during the scan. You may be asked to hold your breath for short periods. · You may be alone in the scanning room, but a technologist will be watching you through a window and talking with you during the test.  What else should you know about the test?  · A CT scan does not hurt. · If a dye is used, you may feel a quick sting or pinch when the IV is started. The dye may make you feel warm and flushed and give you a metallic taste in your mouth. Some people feel sick to their stomach or get a headache. · If you breastfeed and are concerned about whether the dye used in this test is safe, talk to your doctor. Most experts believe that very little dye passes into breast milk and even less is passed on to the baby. But if you prefer, you can store some of your breast milk ahead of time and use it for a day or two after the test.  · There is a small chance of getting cancer from some types of CT scans. The risk is higher in children, young adults, and people who have many radiation tests. If you are concerned about this risk, talk to your doctor about the benefits and risks of a CT scan and confirm that the test is needed. How long does the test take? · The test will take about 30 to 60 minutes. Most of this time is spent getting ready for the scan. The actual test only takes a few minutes.   What happens after the test?  · You will probably be able to go home right away. · You can go back to your usual activities right away. · Drink plenty of fluids for 24 hours after the test if dye was used, unless your doctor tells you not to. When should you call for help? Watch closely for changes in your health, and be sure to contact your doctor if you have any problems. Follow-up care is a key part of your treatment and safety. Be sure to make and go to all appointments, and call your doctor if you are having problems. It's also a good idea to keep a list of the medicines you take. Ask your doctor when you can expect to have your test results. Where can you learn more? Go to http://david-chantelle.info/. Enter X498 in the search box to learn more about \"CT Scan of the Head: About This Test.\"  Current as of: October 14, 2016  Content Version: 11.3  © 1019-9493 [employer]. Care instructions adapted under license by [employer] (which disclaims liability or warranty for this information). If you have questions about a medical condition or this instruction, always ask your healthcare professional. [address] any warranty or liability for your use of this information.

## 2017-11-03 ENCOUNTER — HOSPITAL ENCOUNTER (OUTPATIENT)
Dept: CT IMAGING | Age: 77
Discharge: HOME | End: 2017-11-03
Attending: PSYCHIATRY & NEUROLOGY
Payer: MEDICARE

## 2017-11-03 DIAGNOSIS — R41.89 COGNITIVE IMPAIRMENT: ICD-10-CM

## 2017-11-03 DIAGNOSIS — G91.2 NPH (NORMAL PRESSURE HYDROCEPHALUS) (HCC): ICD-10-CM

## 2017-11-03 PROCEDURE — 70450 CT HEAD/BRAIN W/O DYE: CPT

## 2017-12-11 ENCOUNTER — OFFICE VISIT (OUTPATIENT)
Dept: NEUROLOGY | Age: 77
End: 2017-12-11

## 2017-12-11 VITALS
DIASTOLIC BLOOD PRESSURE: 80 MMHG | BODY MASS INDEX: 26.51 KG/M2 | OXYGEN SATURATION: 99 % | HEART RATE: 63 BPM | WEIGHT: 179 LBS | HEIGHT: 69 IN | RESPIRATION RATE: 18 BRPM | SYSTOLIC BLOOD PRESSURE: 168 MMHG

## 2017-12-11 DIAGNOSIS — G30.8 ALZHEIMER'S DISEASE OF OTHER ONSET WITHOUT BEHAVIORAL DISTURBANCE: ICD-10-CM

## 2017-12-11 DIAGNOSIS — G91.2 NPH (NORMAL PRESSURE HYDROCEPHALUS) (HCC): Primary | ICD-10-CM

## 2017-12-11 DIAGNOSIS — F02.80 ALZHEIMER'S DISEASE OF OTHER ONSET WITHOUT BEHAVIORAL DISTURBANCE: ICD-10-CM

## 2017-12-11 RX ORDER — DONEPEZIL HYDROCHLORIDE 10 MG/1
10 TABLET, FILM COATED ORAL
Qty: 90 TAB | Refills: 1 | Status: SHIPPED | OUTPATIENT
Start: 2017-12-11 | End: 2018-03-27 | Stop reason: SDUPTHER

## 2017-12-11 RX ORDER — DONEPEZIL HYDROCHLORIDE 10 MG/1
10 TABLET, FILM COATED ORAL
Qty: 30 TAB | Refills: 0 | Status: SHIPPED | OUTPATIENT
Start: 2017-12-11 | End: 2018-03-16 | Stop reason: SDUPTHER

## 2017-12-11 NOTE — COMMUNICATION BODY
Chief Complaint   Patient presents with    Follow-up     NPH and dementia       HPI    74-year-old woman with NPH and dementia here to follow-up. Last visit we began low-dose donepezil. No side effects. She is tolerating the medication fine. [place] She [place] no longer driving which makes her very lonely now. Her daughter is here with her. Head CT was done which does not show any acute issue. See previous note for background information. Review of Systems   Psychiatric/Behavioral: Positive for depression. All other systems reviewed and are negative. Past Medical History:   Diagnosis Date    Essential hypertension     Family history of skin cancer     brother    Radiation exposure     breast cancer left    Skin cancer      Family History   Problem Relation Age of Onset    No Known Problems Mother     No Known Problems Father      Social History     Social History    Marital status:      Spouse name: N/A    Number of children: N/A    Years of education: N/A     Occupational History    Not on file. Social History Main Topics    Smoking status: Never Smoker    Smokeless tobacco: Never Used    Alcohol use 1.2 oz/week     1 Cans of beer, 1 Glasses of wine per week    Drug use: Not on file    Sexual activity: Not on file     Other Topics Concern    Not on file     Social History Narrative     Allergies   Allergen Reactions    Codeine Nausea Only    Penicillins Hives         Current Outpatient Prescriptions   Medication Sig    donepezil (ARICEPT) 10 mg tablet Take 1 Tab by mouth nightly.  donepezil (ARICEPT) 10 mg tablet Take 1 Tab by mouth nightly.     metFORMIN ER (GLUCOPHAGE XR) 500 mg tablet     HYDROmorphone (DILAUDID) 2 mg tablet     LORazepam (ATIVAN) 1 mg tablet     losartan-hydrochlorothiazide (HYZAAR) 100-12.5 mg per tablet     sertraline (ZOLOFT) 50 mg tablet     bupivacaine-EPINEPHrine (MARCAINE-EPINEPHRINE) 0.25 %-1:200,000 soln Used for mohs surgery  Indications: LOCAL ANESTHESIA FOR PROCEDURES     No current facility-administered medications for this visit. Neurologic Exam     Mental Status        WD/WN adult in NAD, normal grooming  VSS  Awake and alert. Talkative. Her mood is rather flat. PERRL, nonicteric  Face is symmetric, tongue midline  Speech is fluent and clear  No limb ataxia. No abnl movements. Moving all extemities spontaneously and symmetric  Normal gait    CVS RRR  Lungs nonlabored  Skin is warm and dry         Visit Vitals    /80 (BP 1 Location: Left arm, BP Patient Position: Sitting)    Pulse 63    Resp 18    Ht 5' 9\" (1.753 m)    Wt 81.2 kg (179 lb)    SpO2 99%    BMI 26.43 kg/m2       Assessment and Plan   Diagnoses and all orders for this visit:    1. NPH (normal pressure hydrocephalus)    2. Alzheimer's disease of other onset without behavioral disturbance    Other orders  -     donepezil (ARICEPT) 10 mg tablet; Take 1 Tab by mouth nightly. -     donepezil (ARICEPT) 10 mg tablet; Take 1 Tab by mouth nightly. 71-year-old woman with NPH and likely Alzheimer's. I am going to titrate donepezil to a more therapeutic 10 mg at this point. I discussed the side effects with her. We briefly talked about her living situation. At this point she is not willing to move. Next visit we may consider adding Namenda. Questions were answered. I would like to see her in 2 months or sooner if needed.         Santi Maravilla, [address]  Diplomate ABPN

## 2017-12-11 NOTE — PROGRESS NOTES
Chief Complaint   Patient presents with    Follow-up     NPH and dementia       HPI    71-year-old woman with NPH and dementia here to follow-up. Last visit we began low-dose donepezil. No side effects. She is tolerating the medication fine. [place] She is no longer driving which makes her very lonely now. Her daughter is here with her. Head CT was done which does not show any acute issue. See previous note for background information. Review of Systems   Psychiatric/Behavioral: Positive for depression. All other systems reviewed and are negative. Past Medical History:   Diagnosis Date    Essential hypertension     Family history of skin cancer     brother    Radiation exposure     breast cancer left    Skin cancer      Family History   Problem Relation Age of Onset    No Known Problems Mother     No Known Problems Father      Social History     Social History    Marital status:      Spouse name: N/A    Number of children: N/A    Years of education: N/A     Occupational History    Not on file. Social History Main Topics    Smoking status: Never Smoker    Smokeless tobacco: Never Used    Alcohol use 1.2 oz/week     1 Cans of beer, 1 Glasses of wine per week    Drug use: Not on file    Sexual activity: Not on file     Other Topics Concern    Not on file     Social History Narrative     Allergies   Allergen Reactions    Codeine Nausea Only    Penicillins Hives         Current Outpatient Prescriptions   Medication Sig    donepezil (ARICEPT) 10 mg tablet Take 1 Tab by mouth nightly.  donepezil (ARICEPT) 10 mg tablet Take 1 Tab by mouth nightly.     metFORMIN ER (GLUCOPHAGE XR) 500 mg tablet     HYDROmorphone (DILAUDID) 2 mg tablet     LORazepam (ATIVAN) 1 mg tablet     losartan-hydrochlorothiazide (HYZAAR) 100-12.5 mg per tablet     sertraline (ZOLOFT) 50 mg tablet     bupivacaine-EPINEPHrine (MARCAINE-EPINEPHRINE) 0.25 %-1:200,000 soln Used for mohs surgery  Indications: LOCAL ANESTHESIA FOR PROCEDURES     No current facility-administered medications for this visit. Neurologic Exam     Mental Status        WD/WN adult in NAD, normal grooming  VSS  Awake and alert. Talkative. Her mood is rather flat. PERRL, nonicteric  Face is symmetric, tongue midline  Speech is fluent and clear  No limb ataxia. No abnl movements. Moving all extemities spontaneously and symmetric  Normal gait    CVS RRR  Lungs nonlabored  Skin is warm and dry         Visit Vitals    /80 (BP 1 Location: Left arm, BP Patient Position: Sitting)    Pulse 63    Resp 18    Ht 5' 9\" (1.753 m)    Wt 81.2 kg (179 lb)    SpO2 99%    BMI 26.43 kg/m2       Assessment and Plan   Diagnoses and all orders for this visit:    1. NPH (normal pressure hydrocephalus)    2. Alzheimer's disease of other onset without behavioral disturbance    Other orders  -     donepezil (ARICEPT) 10 mg tablet; Take 1 Tab by mouth nightly. -     donepezil (ARICEPT) 10 mg tablet; Take 1 Tab by mouth nightly. 76-year-old woman with NPH and likely Alzheimer's. I am going to titrate donepezil to a more therapeutic 10 mg at this point. I discussed the side effects with her. We briefly talked about her living situation. At this point she is not willing to move. Next visit we may consider adding Namenda. Questions were answered. I would like to see her in 2 months or sooner if needed.         2 [facility], [facility] [address]  Diplomate SHERIF

## 2017-12-11 NOTE — MR AVS SNAPSHOT
[facility] [address] [phone] Patient: Delfina Muñoz MRN: [id number] SCOTTY:[birth year] Visit Information Date & Time Provider Department Dept. Phone Encounter #  
 12/11/2017 10:20 AM DO [facility] at [address] [id number] Follow-up Instructions Return in about 2 months (around 2/11/2018). Upcoming Health Maintenance Date Due DTaP/Tdap/Td series (1 - Tdap) 9/25/1960 ZOSTER VACCINE AGE 60> 7/25/1999 GLAUCOMA SCREENING Q2Y 9/25/2004 OSTEOPOROSIS SCREENING (DEXA) 9/25/2004 Pneumococcal 65+ Low/Medium Risk (1 of 2 - PCV13) 9/25/2004 MEDICARE YEARLY EXAM 9/25/2004 Influenza Age 5 to Adult 8/1/2017 Allergies as of 12/11/2017  Review Complete On: 11/3/2017 By: Luigi Best  
  
 Severity Noted Reaction Type Reactions Codeine  10/30/2015    Nausea Only Penicillins  10/30/2015    Hives Current Immunizations  Never Reviewed No immunizations on file. Not reviewed this visit You Were Diagnosed With   
  
 Codes Comments NPH (normal pressure hydrocephalus)    -  Primary ICD-10-CM: G91.2 ICD-9-CM: 331.5 Alzheimer's disease of other onset without behavioral disturbance     ICD-10-CM: G30.8, F02.80 ICD-9-CM: 331.0, 294.10 Vitals BP Pulse Resp Height(growth percentile) Weight(growth percentile) SpO2  
 168/80 (BP 1 Location: Left arm, BP Patient Position: Sitting) 63 18 5' 9\" (1.753 m) 179 lb (81.2 kg) 99% BMI Smoking Status 26.43 kg/m2 Never Smoker BMI and BSA Data Body Mass Index Body Surface Area  
 26.43 kg/m 2 1.99 m 2 Preferred Pharmacy Pharmacy Name Phone [address] [facility] [address] [phone] Your Updated Medication List  
  
   
 This list is accurate as of: 12/11/17 10:32 AM.  Always use your most recent med list.  
  
  
  
  
 bupivacaine-EPINEPHrine 0.25 %-1:200,000 Soln Commonly known as:  Carr Cassette Used for mohs surgery  Indications: LOCAL ANESTHESIA FOR PROCEDURES  
  
 * donepezil 10 mg tablet Commonly known as:  ARICEPT Take 1 Tab by mouth nightly. * donepezil 10 mg tablet Commonly known as:  ARICEPT Take 1 Tab by mouth nightly. HYDROmorphone 2 mg tablet Commonly known as:  DILAUDID LORazepam 1 mg tablet Commonly known as:  ATIVAN  
  
 losartan-hydroCHLOROthiazide 100-12.5 mg per tablet Commonly known as:  HYZAAR  
  
 metFORMIN  mg tablet Commonly known as:  GLUCOPHAGE XR  
  
 sertraline 50 mg tablet Commonly known as:  ZOLOFT * Notice: This list has 2 medication(s) that are the same as other medications prescribed for you. Read the directions carefully, and ask your doctor or other care provider to review them with you. Prescriptions Printed Refills  
 donepezil (ARICEPT) 10 mg tablet 0 Sig: Take 1 Tab by mouth nightly. Class: Print Route: Oral  
  
Prescriptions Sent to Pharmacy Refills  
 donepezil (ARICEPT) 10 mg tablet 1 Sig: Take 1 Tab by mouth nightly. Class: Normal  
 Pharmacy: [address] [place] [address], [address] [place] [address] #: [phone] Route: Oral  
  
Follow-up Instructions Return in about 2 months (around 2/11/2018). Patient Instructions A Healthy Lifestyle: Care Instructions Your Care Instructions A healthy lifestyle can help you feel good, stay at a healthy weight, and have plenty of energy for both work and play. A healthy lifestyle is something you can share with your whole family. A healthy lifestyle also can lower your risk for serious health problems, such as high blood pressure, heart disease, and diabetes. You can follow a few steps listed below to improve your health and the health of your family. Follow-up care is a key part of your treatment and safety. Be sure to make and go to all appointments, and call your doctor if you are having problems. It's also a good idea to know your test results and keep a list of the medicines you take. How can you care for yourself at home? · Do not eat too much sugar, fat, or fast foods. You can still have dessert and treats now and then. The goal is moderation. · Start small to improve your eating habits. Pay attention to portion sizes, drink less juice and soda pop, and eat more fruits and vegetables. ¨ Eat a healthy amount of food. A 3-ounce serving of meat, for example, is about the size of a deck of cards. Fill the rest of your plate with vegetables and whole grains. ¨ Limit the amount of soda and sports drinks you have every day. Drink more water when you are thirsty. ¨ Eat at least 5 servings of fruits and vegetables every day. It may seem like a lot, but it is not hard to reach this goal. A serving or helping is 1 piece of fruit, 1 cup of vegetables, or 2 cups of leafy, raw vegetables. Have an apple or some carrot sticks as an afternoon snack instead of a candy bar. Try to have fruits and/or vegetables at every meal. 
· Make exercise part of your daily routine. You may want to start with simple activities, such as walking, bicycling, or slow swimming. Try to be active 30 to 60 minutes every day. You do not need to do all 30 to 60 minutes all at once. For example, you can exercise 3 times a day for 10 or 20 minutes. Moderate exercise is safe for most people, but it is always a good idea to talk to your doctor before starting an exercise program. 
· Keep moving. Tad Kayley the lawn, work in the garden, or [employer]. Take the stairs instead of the elevator at work. · If you smoke, quit.  People who smoke have an increased risk for heart attack, stroke, cancer, and other lung illnesses. Quitting is hard, but there are ways to boost your chance of quitting tobacco for good. ¨ Use nicotine gum, patches, or lozenges. ¨ Ask your doctor about stop-smoking programs and medicines. ¨ Keep trying. In addition to reducing your risk of diseases in the future, you will notice some benefits soon after you stop using tobacco. If you have shortness of breath or asthma symptoms, they will likely get better within a few weeks after you quit. · Limit how much alcohol you drink. Moderate amounts of alcohol (up to 2 drinks a day for men, 1 drink a day for women) are okay. But drinking too much can lead to liver problems, high blood pressure, and other health problems. Family health If you have a family, there are many things you can do together to improve your health. · Eat meals together as a family as often as possible. · Eat healthy foods. This includes fruits, vegetables, lean meats and dairy, and whole grains. · Include your family in your fitness plan. Most people think of activities such as jogging or tennis as the way to fitness, but there are many ways you and your family can be more active. Anything that makes you breathe hard and gets your heart pumping is exercise. Here are some tips: 
¨ Walk to do errands or to take your child to school or the bus. ¨ Go for a family bike ride after dinner instead of watching TV. Where can you learn more? Go to http://david-chantelle.info/. Enter F843 in the search box to learn more about \"A Healthy Lifestyle: Care Instructions. \" Current as of: May 12, 2017 Content Version: 11.4 © 3626-7202 [employer]. Care instructions adapted under license by [employer] (which disclaims liability or warranty for this information).  If you have questions about a medical condition or this instruction, always ask your healthcare professional. [employer] disclaims any warranty or liability for your use of this information. Introducing [facility]! [facility] introduces [employer] patient portal. Now you can access parts of your medical record, email your doctor's office, and request medication refills online. 1. In your internet browser, go to [email or web address] 2. Click on the First Time User? Click Here link in the Sign In box. You will see the New Member Sign Up page. 3. Enter your [employer] Access Code exactly as it appears below. You will not need to use this code after youve completed the sign-up process. If you do not sign up before the expiration date, you must request a new code. · [employer] Access Code: [email or web address] Expires: 1/4/2018  9:57 AM 
 
4. Enter the last four digits of your Social Security Number (xxxx) and Date of Birth (mm/dd/yyyy) as indicated and click Submit. You will be taken to the next sign-up page. 5. Create a [employer] ID. This will be your [employer] login ID and cannot be changed, so think of one that is secure and easy to remember. 6. Create a [employer] password. You can change your password at any time. 7. Enter your Password Reset Question and Answer. This can be used at a later time if you forget your password. 8. Enter your e-mail address. You will receive e-mail notification when new information is available in [address] 9. Click Sign Up. You can now view and download portions of your medical record. 10. Click the Download Summary menu link to download a portable copy of your medical information. If you have questions, please visit the Frequently Asked Questions section of the [employer] website. Remember, [employer] is NOT to be used for urgent needs. For medical emergencies, dial 911. Now available from your iPhone and Android! Please provide this summary of care documentation to your next provider. Your primary care clinician is listed as Valentino Ba. If you have any questions after today's visit, please call [phone].

## 2017-12-11 NOTE — PATIENT INSTRUCTIONS

## 2017-12-11 NOTE — LETTER
12/11/2017 Patient:  Kelsi Rojas YOB: 1940 Date of Visit: 12/11/2017 Dear Gio Noel DO 
[address] [place] [address] VIA Facsimile: [phone] 
 : 
 
 
I was requested by Gio Noel DO to evaluate Ms. Kelsi Rojas  for Chief Complaint Patient presents with  Follow-up NPH and dementia Audra Ibrahim I am recommending the following:  
 
Diagnoses and all orders for this visit: 1. NPH (normal pressure hydrocephalus) 2. Alzheimer's disease of other onset without behavioral disturbance Other orders 
-     donepezil (ARICEPT) 10 mg tablet; Take 1 Tab by mouth nightly. -     donepezil (ARICEPT) 10 mg tablet; Take 1 Tab by mouth nightly. 
 
 
 
---------------------------------------------------------------------------------------------------------------------- Below is my encounter: Chief Complaint Patient presents with  Follow-up NPH and dementia HPI 
 
39-year-old woman with NPH and dementia here to follow-up. Last visit we began low-dose donepezil. No side effects. She is tolerating the medication fine. [place] She is no longer driving which makes her very lonely now. Her daughter is here with her. Head CT was done which does not show any acute issue. See previous note for background information. Review of Systems Psychiatric/Behavioral: Positive for depression. All other systems reviewed and are negative. Past Medical History:  
Diagnosis Date  Essential hypertension  Family history of skin cancer   
 brother  Radiation exposure   
 breast cancer left  Skin cancer Family History Problem Relation Age of Onset  No Known Problems Mother  No Known Problems Father Social History Social History  Marital status:    Spouse name: N/A  
 Number of children: N/A  
 Years of education: N/A  
 
 Occupational History  Not on file. Social History Main Topics  Smoking status: Never Smoker  Smokeless tobacco: Never Used  Alcohol use 1.2 oz/week 1 Cans of beer, 1 Glasses of wine per week  Drug use: Not on file  Sexual activity: Not on file Other Topics Concern  Not on file Social History Narrative Allergies Allergen Reactions  Codeine Nausea Only  Penicillins Hives Current Outpatient Prescriptions Medication Sig  
 donepezil (ARICEPT) 10 mg tablet Take 1 Tab by mouth nightly.  donepezil (ARICEPT) 10 mg tablet Take 1 Tab by mouth nightly.  metFORMIN ER (GLUCOPHAGE XR) 500 mg tablet  HYDROmorphone (DILAUDID) 2 mg tablet  LORazepam (ATIVAN) 1 mg tablet  losartan-hydrochlorothiazide (HYZAAR) 100-12.5 mg per tablet  sertraline (ZOLOFT) 50 mg tablet  bupivacaine-EPINEPHrine (MARCAINE-EPINEPHRINE) 0.25 %-1:200,000 soln Used for mohs surgery  Indications: LOCAL ANESTHESIA FOR PROCEDURES No current facility-administered medications for this visit. Neurologic Exam  
 
Mental Status WD/WN adult in NAD, normal grooming VSS Awake and alert. Talkative. Her mood is rather flat. PERRL, nonicteric Face is symmetric, tongue midline Speech is fluent and clear No limb ataxia. No abnl movements. Moving all extemities spontaneously and symmetric Normal gait CVS RRR Lungs nonlabored Skin is warm and dry Visit Vitals  /80 (BP 1 Location: Left arm, BP Patient Position: Sitting)  Pulse 63  Resp 18  Ht 5' 9\" (1.753 m)  Wt 81.2 kg (179 lb)  SpO2 99%  BMI 26.43 kg/m2 Assessment and Plan Diagnoses and all orders for this visit: 1. NPH (normal pressure hydrocephalus) 2. Alzheimer's disease of other onset without behavioral disturbance Other orders 
-     donepezil (ARICEPT) 10 mg tablet; Take 1 Tab by mouth nightly. -     donepezil (ARICEPT) 10 mg tablet; Take 1 Tab by mouth nightly. 58-year-old woman with NPH and likely Alzheimer's. I am going to titrate donepezil to a more therapeutic 10 mg at this point. I discussed the side effects with her. We briefly talked about her living situation. At this point she is not willing to move. Next visit we may consider adding Namenda. Questions were answered. I would like to see her in 2 months or sooner if needed. Thank you for giving me the opportunity to assist in the care of Ms. Georgia Hilton. If you have questions, please do not hesitate to contact me. Sincerely, [address] [facility], DO Neurologist 
Diplomate SHERIF

## 2018-03-16 ENCOUNTER — OFFICE VISIT (OUTPATIENT)
Dept: NEUROLOGY | Age: 78
End: 2018-03-16

## 2018-03-16 VITALS
HEART RATE: 87 BPM | BODY MASS INDEX: 26.96 KG/M2 | OXYGEN SATURATION: 93 % | SYSTOLIC BLOOD PRESSURE: 140 MMHG | WEIGHT: 182 LBS | RESPIRATION RATE: 18 BRPM | DIASTOLIC BLOOD PRESSURE: 70 MMHG | HEIGHT: 69 IN

## 2018-03-16 DIAGNOSIS — F43.20 ADJUSTMENT DISORDER, UNSPECIFIED TYPE: ICD-10-CM

## 2018-03-16 DIAGNOSIS — G30.8 ALZHEIMER'S DISEASE OF OTHER ONSET WITHOUT BEHAVIORAL DISTURBANCE: Primary | ICD-10-CM

## 2018-03-16 DIAGNOSIS — F02.80 ALZHEIMER'S DISEASE OF OTHER ONSET WITHOUT BEHAVIORAL DISTURBANCE: Primary | ICD-10-CM

## 2018-03-16 RX ORDER — SERTRALINE HYDROCHLORIDE 25 MG/1
75 TABLET, FILM COATED ORAL
Qty: 90 TAB | Refills: 3 | Status: SHIPPED | OUTPATIENT
Start: 2018-03-16 | End: 2018-03-27 | Stop reason: SDUPTHER

## 2018-03-16 NOTE — COMMUNICATION BODY
Chief Complaint   Patient presents with    Memory Loss     Alzheimer's NPH       HPI    Ms. Anamaria Jason is a 69-year-old woman here to follow-up. I follow her for dementia, likely Alzheimer's type. Last visit we titrated donepezil 10 mg and there have been no side effects. Her mood is rather poor lately. She has just made the decision to move into independent living at an [facility]. She is anxious about this. No falls. Not getting lost.  She is here with her daughter who reports the same. Review of Systems   Psychiatric/Behavioral: Positive for depression and memory loss. The patient is nervous/anxious. All other systems reviewed and are negative. Past Medical History:   Diagnosis Date    Essential hypertension     Family history of skin cancer     brother    Radiation exposure     breast cancer left    Skin cancer      Family History   Problem Relation Age of Onset    No Known Problems Mother     No Known Problems Father      Social History     Social History    Marital status:      Spouse name: N/A    Number of children: N/A    Years of education: N/A     Occupational History    Not on file. Social History Main Topics    Smoking status: Never Smoker    Smokeless tobacco: Never Used    Alcohol use 1.2 oz/week     1 Cans of beer, 1 Glasses of wine per week    Drug use: Not on file    Sexual activity: Not on file     Other Topics Concern    Not on file     Social History Narrative     Allergies   Allergen Reactions    Codeine Nausea Only    Penicillins Hives         Current Outpatient Prescriptions   Medication Sig    sertraline (ZOLOFT) 25 mg tablet Take 3 Tabs by mouth nightly.  donepezil (ARICEPT) 10 mg tablet Take 1 Tab by mouth nightly.     metFORMIN ER (GLUCOPHAGE XR) 500 mg tablet     HYDROmorphone (DILAUDID) 2 mg tablet     LORazepam (ATIVAN) 1 mg tablet     losartan-hydrochlorothiazide (HYZAAR) 100-12.5 mg per tablet     bupivacaine-EPINEPHrine (MARCAINE-EPINEPHRINE) 0.25 %-1:200,000 soln Used for mohs surgery  Indications: LOCAL ANESTHESIA FOR PROCEDURES     No current facility-administered medications for this visit. Neurologic Exam     Mental Status        WD/WN adult in NAD, normal grooming  VSS  Awake and alert. Somewhat depressed mood. PERRL, nonicteric  Face is symmetric, tongue midline  Speech is fluent and clear  No limb ataxia. No abnl movements. Moving all extemities spontaneously and symmetric  Cautious slow gait    Upon returning from the restroom she went into the wrong room. She seemed confused in the hallway. CVS RRR  Lungs nonlabored  Skin is warm and dry         Visit Vitals    /70    Pulse 87    Resp 18    Ht 5' 9\" (1.753 m)    Wt 82.6 kg (182 lb)    LMP Comment: menopausal    SpO2 93%    BMI 26.88 kg/m2       Assessment and Plan   Diagnoses and all orders for this visit:    1. Alzheimer's disease of other onset without behavioral disturbance    2. Adjustment disorder, unspecified type    Other orders  -     sertraline (ZOLOFT) 25 mg tablet; Take 3 Tabs by mouth nightly. 60-year-old woman with likely Alzheimer's dementia, history of NPH. She is tolerating donepezil at this point well. No changes. I am concerned she is suffering from an adjustment disorder particularly given the dramatic living change to occur very soon. I am going to titrate sertraline to 75 mg daily. I would like to reassess her after she completes her transition.         [address] [place] [address], [address]  Diplomate SHERIF

## 2018-03-16 NOTE — LETTER
3/16/2018 Patient:  Rafi Meza YOB: 1940 Date of Visit: 3/16/2018 Dear Juan Jose Davison DO 
[address] [place] [id number] VIA Facsimile: [phone] 
 : 
 
 
I was requested by Juan Jose Davison DO to evaluate Ms. Rafi Meza  for Chief Complaint Patient presents with  Memory Loss Alzheimer's NPH Damaris King I am recommending the following:  
 
Diagnoses and all orders for this visit: 1. Alzheimer's disease of other onset without behavioral disturbance 2. Adjustment disorder, unspecified type Other orders 
-     sertraline (ZOLOFT) 25 mg tablet; Take 3 Tabs by mouth nightly. 
 
 
 
---------------------------------------------------------------------------------------------------------------------- Below is my encounter: Chief Complaint Patient presents with  Memory Loss Alzheimer's NPH  
 
 
HPI Ms. Km Baca is a 78-year-old woman here to follow-up. I follow her for dementia, likely Alzheimer's type. Last visit we titrated donepezil 10 mg and there have been no side effects. Her mood is rather poor lately. She has just made the decision to move into independent living at an [facility]. She is anxious about this. No falls. Not getting lost.  She is here with her daughter who reports the same. Review of Systems Psychiatric/Behavioral: Positive for depression and memory loss. The patient is nervous/anxious. All other systems reviewed and are negative. Past Medical History:  
Diagnosis Date  Essential hypertension  Family history of skin cancer   
 brother  Radiation exposure   
 breast cancer left  Skin cancer Family History Problem Relation Age of Onset  No Known Problems Mother  No Known Problems Father Social History Social History  Marital status:  Spouse name: N/A  
 Number of children: N/A  
 Years of education: N/A Occupational History  Not on file. Social History Main Topics  Smoking status: Never Smoker  Smokeless tobacco: Never Used  Alcohol use 1.2 oz/week 1 Cans of beer, 1 Glasses of wine per week  Drug use: Not on file  Sexual activity: Not on file Other Topics Concern  Not on file Social History Narrative Allergies Allergen Reactions  Codeine Nausea Only  Penicillins Hives Current Outpatient Prescriptions Medication Sig  sertraline (ZOLOFT) 25 mg tablet Take 3 Tabs by mouth nightly.  donepezil (ARICEPT) 10 mg tablet Take 1 Tab by mouth nightly.  metFORMIN ER (GLUCOPHAGE XR) 500 mg tablet  HYDROmorphone (DILAUDID) 2 mg tablet  LORazepam (ATIVAN) 1 mg tablet  losartan-hydrochlorothiazide (HYZAAR) 100-12.5 mg per tablet  bupivacaine-EPINEPHrine (MARCAINE-EPINEPHRINE) 0.25 %-1:200,000 soln Used for mohs surgery  Indications: LOCAL ANESTHESIA FOR PROCEDURES No current facility-administered medications for this visit. Neurologic Exam  
 
Mental Status WD/WN adult in NAD, normal grooming VSS Awake and alert. Somewhat depressed mood. PERRL, nonicteric Face is symmetric, tongue midline Speech is fluent and clear No limb ataxia. No abnl movements. Moving all extemities spontaneously and symmetric Cautious slow gait Upon returning from the restroom she went into the wrong room. She seemed confused in the hallway. CVS RRR Lungs nonlabored Skin is warm and dry Visit Vitals  /70  Pulse 87  Resp 18  Ht 5' 9\" (1.753 m)  Wt 82.6 kg (182 lb)  LMP Comment: menopausal  
 SpO2 93%  BMI 26.88 kg/m2 Assessment and Plan Diagnoses and all orders for this visit: 1. Alzheimer's disease of other onset without behavioral disturbance 2. Adjustment disorder, unspecified type Other orders 
-     sertraline (ZOLOFT) 25 mg tablet; Take 3 Tabs by mouth nightly. 76-year-old woman with likely Alzheimer's dementia, history of NPH. She is tolerating donepezil at this point well. No changes. I am concerned she is suffering from an adjustment disorder particularly given the dramatic living change to occur very soon. I am going to titrate sertraline to 75 mg daily. I would like to reassess her after she completes her transition. Thank you for giving me the opportunity to assist in the care of Ms. Nichol Browning. If you have questions, please do not hesitate to contact me. Sincerely, [facility], DO Neurologist 
Diplomate SHERIF

## 2018-03-16 NOTE — PROGRESS NOTES
Chief Complaint   Patient presents with    Memory Loss     Alzheimer's NPH       HPI    Ms. Christina Parks is a 74-year-old woman here to follow-up. I follow her for dementia, likely Alzheimer's type. Last visit we titrated donepezil 10 mg and there have been no side effects. Her mood is rather poor lately. She has just made the decision to move into independent living at an [facility]. She is anxious about this. No falls. Not getting lost.  She is here with her daughter who reports the same. Review of Systems   Psychiatric/Behavioral: Positive for depression and memory loss. The patient is nervous/anxious. All other systems reviewed and are negative. Past Medical History:   Diagnosis Date    Essential hypertension     Family history of skin cancer     brother    Radiation exposure     breast cancer left    Skin cancer      Family History   Problem Relation Age of Onset    No Known Problems Mother     No Known Problems Father      Social History     Social History    Marital status:      Spouse name: N/A    Number of children: N/A    Years of education: N/A     Occupational History    Not on file. Social History Main Topics    Smoking status: Never Smoker    Smokeless tobacco: Never Used    Alcohol use 1.2 oz/week     1 Cans of beer, 1 Glasses of wine per week    Drug use: Not on file    Sexual activity: Not on file     Other Topics Concern    Not on file     Social History Narrative     Allergies   Allergen Reactions    Codeine Nausea Only    Penicillins Hives         Current Outpatient Prescriptions   Medication Sig    sertraline (ZOLOFT) 25 mg tablet Take 3 Tabs by mouth nightly.  donepezil (ARICEPT) 10 mg tablet Take 1 Tab by mouth nightly.     metFORMIN ER (GLUCOPHAGE XR) 500 mg tablet     HYDROmorphone (DILAUDID) 2 mg tablet     LORazepam (ATIVAN) 1 mg tablet     losartan-hydrochlorothiazide (HYZAAR) 100-12.5 mg per tablet     bupivacaine-EPINEPHrine (MARCAINE-EPINEPHRINE) 0.25 %-1:200,000 soln Used for mohs surgery  Indications: LOCAL ANESTHESIA FOR PROCEDURES     No current facility-administered medications for this visit. Neurologic Exam     Mental Status        WD/WN adult in NAD, normal grooming  VSS  Awake and alert. Somewhat depressed mood. PERRL, nonicteric  Face is symmetric, tongue midline  Speech is fluent and clear  No limb ataxia. No abnl movements. Moving all extemities spontaneously and symmetric  Cautious slow gait    Upon returning from the restroom she went into the wrong room. She seemed confused in the hallway. CVS RRR  Lungs nonlabored  Skin is warm and dry         Visit Vitals    /70    Pulse 87    Resp 18    Ht 5' 9\" (1.753 m)    Wt 82.6 kg (182 lb)    LMP Comment: menopausal    SpO2 93%    BMI 26.88 kg/m2       Assessment and Plan   Diagnoses and all orders for this visit:    1. Alzheimer's disease of other onset without behavioral disturbance    2. Adjustment disorder, unspecified type    Other orders  -     sertraline (ZOLOFT) 25 mg tablet; Take 3 Tabs by mouth nightly. 67-year-old woman with likely Alzheimer's dementia, history of NPH. She is tolerating donepezil at this point well. No changes. I am concerned she is suffering from an adjustment disorder particularly given the dramatic living change to occur very soon. I am going to titrate sertraline to 75 mg daily. I would like to reassess her after she completes her transition.         [address] [place] [address], [address]  Diplomate SHERIF

## 2018-03-16 NOTE — MR AVS SNAPSHOT
[place] [address] [address] [place] [address] 
[phone] Patient: Madhav Rose MRN: [id number] RTD:4/33/3453 Visit Information Date & Time Provider Department Dept. Phone Encounter #  
 3/16/2018 11:20 AM Shlomo Raúl DO [facility] at [address] [id number] Follow-up Instructions Return in about 2 months (around 5/16/2018). Upcoming Health Maintenance Date Due DTaP/Tdap/Td series (1 - Tdap) 9/25/1960 ZOSTER VACCINE AGE 60> 7/25/1999 GLAUCOMA SCREENING Q2Y 9/25/2004 Bone Densitometry (Dexa) Screening 9/25/2004 Pneumococcal 65+ Low/Medium Risk (1 of 2 - PCV13) 9/25/2004 MEDICARE YEARLY EXAM 9/25/2004 Influenza Age 5 to Adult 8/1/2017 Allergies as of 3/16/2018  Review Complete On: 12/11/2017 By: 2 [facility] DO [place] Severity Noted Reaction Type Reactions Codeine  10/30/2015    Nausea Only Penicillins  10/30/2015    Hives Current Immunizations  Never Reviewed No immunizations on file. Not reviewed this visit You Were Diagnosed With   
  
 Codes Comments Alzheimer's disease of other onset without behavioral disturbance    -  Primary ICD-10-CM: G30.8, F02.80 ICD-9-CM: 331.0, 294.10 Adjustment disorder, unspecified type     ICD-10-CM: F43.20 ICD-9-CM: 309.9 Vitals Smoking Status Never Smoker Preferred Pharmacy Pharmacy Name Phone [address] [facility] [address] [place] [address] [phone] Your Updated Medication List  
  
   
This list is accurate as of 3/16/18 11:44 AM.  Always use your most recent med list.  
  
  
  
  
 bupivacaine-EPINEPHrine 0.25 %-1:200,000 Soln Commonly known as:  Paloma Luciano Used for mohs surgery  Indications: LOCAL ANESTHESIA FOR PROCEDURES  
  
 donepezil 10 mg tablet Commonly known as:  ARICEPT  
 Take 1 Tab by mouth nightly. HYDROmorphone 2 mg tablet Commonly known as:  DILAUDID LORazepam 1 mg tablet Commonly known as:  ATIVAN  
  
 losartan-hydroCHLOROthiazide 100-12.5 mg per tablet Commonly known as:  HYZAAR  
  
 metFORMIN  mg tablet Commonly known as:  GLUCOPHAGE XR  
  
 sertraline 25 mg tablet Commonly known as:  ZOLOFT Take 3 Tabs by mouth nightly. Prescriptions Sent to Pharmacy Refills  
 sertraline (ZOLOFT) 25 mg tablet 3 Sig: Take 3 Tabs by mouth nightly. Class: Normal  
 Pharmacy: [address] [place] [address], [address] [place] [address] #: [phone] Route: Oral  
  
Follow-up Instructions Return in about 2 months (around 5/16/2018). Introducing hospitals & HEALTH SERVICES! [employer] introduces [employer] patient portal. Now you can access parts of your medical record, email your doctor's office, and request medication refills online. 1. In your internet browser, go to [email or web address] 2. Click on the First Time User? Click Here link in the Sign In box. You will see the New Member Sign Up page. 3. Enter your [place] Access Code exactly as it appears below. You will not need to use this code after youve completed the sign-up process. If you do not sign up before the expiration date, you must request a new code. · [place] Access Code: [email or web address] Expires: 5/20/2018 10:29 AM 
 
4. Enter the last four digits of your Social Security Number (xxxx) and Date of Birth (mm/dd/yyyy) as indicated and click Submit. You will be taken to the next sign-up page. 5. Create a Vyyknt ID. This will be your [facility] login ID and cannot be changed, so think of one that is secure and easy to remember. 6. Create a [place] password. You can change your password at any time. 7. Enter your Password Reset Question and Answer. This can be used at a later time if you forget your password. 8. Enter your e-mail address. You will receive e-mail notification when new information is available in [address] 9. Click Sign Up. You can now view and download portions of your medical record. 10. Click the Download Summary menu link to download a portable copy of your medical information. If you have questions, please visit the Frequently Asked Questions section of the [employer] website. Remember, [employer] is NOT to be used for urgent needs. For medical emergencies, dial 911. Now available from your iPhone and Android! Please provide this summary of care documentation to your next provider. Your primary care clinician is listed as Sylvia Martins. If you have any questions after today's visit, please call [phone].

## 2018-03-26 ENCOUNTER — TELEPHONE (OUTPATIENT)
Dept: NEUROLOGY | Age: 78
End: 2018-03-26

## 2018-03-26 NOTE — TELEPHONE ENCOUNTER
Pt daughter calling, stated that [facility] never received refill request for aricept or zoloft. Requesting that prescriptions be resubmitted to pharmacy.

## 2018-03-27 RX ORDER — DONEPEZIL HYDROCHLORIDE 10 MG/1
10 TABLET, FILM COATED ORAL
Qty: 90 TAB | Refills: 1 | Status: SHIPPED | OUTPATIENT
Start: 2018-03-27 | End: 2018-06-01 | Stop reason: SDUPTHER

## 2018-03-27 RX ORDER — SERTRALINE HYDROCHLORIDE 25 MG/1
75 TABLET, FILM COATED ORAL
Qty: 90 TAB | Refills: 3 | Status: SHIPPED | OUTPATIENT
Start: 2018-03-27 | End: 2018-06-01 | Stop reason: SDUPTHER

## 2018-06-01 ENCOUNTER — OFFICE VISIT (OUTPATIENT)
Dept: NEUROLOGY | Age: 78
End: 2018-06-01

## 2018-06-01 VITALS
DIASTOLIC BLOOD PRESSURE: 78 MMHG | OXYGEN SATURATION: 98 % | BODY MASS INDEX: 26.14 KG/M2 | SYSTOLIC BLOOD PRESSURE: 138 MMHG | WEIGHT: 177 LBS | HEART RATE: 78 BPM | RESPIRATION RATE: 18 BRPM

## 2018-06-01 DIAGNOSIS — G30.8 ALZHEIMER'S DISEASE OF OTHER ONSET WITHOUT BEHAVIORAL DISTURBANCE: Primary | ICD-10-CM

## 2018-06-01 DIAGNOSIS — F43.20 ADJUSTMENT DISORDER, UNSPECIFIED TYPE: ICD-10-CM

## 2018-06-01 DIAGNOSIS — F02.80 ALZHEIMER'S DISEASE OF OTHER ONSET WITHOUT BEHAVIORAL DISTURBANCE: Primary | ICD-10-CM

## 2018-06-01 DIAGNOSIS — F41.9 ANXIETY AND DEPRESSION: ICD-10-CM

## 2018-06-01 DIAGNOSIS — F32.A ANXIETY AND DEPRESSION: ICD-10-CM

## 2018-06-01 RX ORDER — MEMANTINE HYDROCHLORIDE 5 MG/1
5 TABLET ORAL 2 TIMES DAILY
Qty: 60 TAB | Refills: 3 | Status: SHIPPED | OUTPATIENT
Start: 2018-06-01 | End: 2018-09-11 | Stop reason: SDUPTHER

## 2018-06-01 RX ORDER — DONEPEZIL HYDROCHLORIDE 10 MG/1
10 TABLET, FILM COATED ORAL
Qty: 90 TAB | Refills: 1 | Status: SHIPPED | OUTPATIENT
Start: 2018-06-01 | End: 2018-12-03 | Stop reason: ALTCHOICE

## 2018-06-01 RX ORDER — SERTRALINE HYDROCHLORIDE 25 MG/1
75 TABLET, FILM COATED ORAL
Qty: 90 TAB | Refills: 3 | Status: SHIPPED | OUTPATIENT
Start: 2018-06-01 | End: 2018-09-17 | Stop reason: SDUPTHER

## 2018-06-01 NOTE — PATIENT INSTRUCTIONS

## 2018-06-01 NOTE — LETTER
6/1/2018 Patient:  Fremont Schwab YOB: 1940 Date of Visit: 6/1/2018 Dear Moy Arroyo DO 
[address] [place] [address] VIA Facsimile: [phone] 
 : 
 
 
I was requested by Moy Arroyo DO to evaluate Ms. Fremont Schwab  for Chief Complaint Patient presents with  Memory Loss [place] Ring I am recommending the following:  
 
Diagnoses and all orders for this visit: 1. Alzheimer's disease of other onset without behavioral disturbance 
-     REFERRAL TO NEUROPSYCHOLOGY 
-     REFERRAL TO PSYCHIATRY 2. Adjustment disorder, unspecified type 
-     REFERRAL TO NEUROPSYCHOLOGY 
-     REFERRAL TO PSYCHIATRY 3. Anxiety and depression 
-     REFERRAL TO NEUROPSYCHOLOGY 
-     REFERRAL TO PSYCHIATRY Other orders 
-     memantine (NAMENDA) 5 mg tablet; Take 1 Tab by mouth two (2) times a day. -     sertraline (ZOLOFT) 25 mg tablet; Take 3 Tabs by mouth nightly. -     donepezil (ARICEPT) 10 mg tablet; Take 1 Tab by mouth nightly. 
 
 
 
---------------------------------------------------------------------------------------------------------------------- Below is my encounter: Chief Complaint Patient presents with  Memory Loss HPI 
 
78-year-old woman with dementia here to follow-up. Since her last visit she has transitioned into an assisted living facility. She is still not happy with this change and finds herself very socially withdrawn at times. She does not go on the weekly field trips shopping. She walks her dog alone a few times a day. Medications are prepared for her via a new electronic dispenser. Mood is still barely okay. Daughter reports that the first month and the new residence was very challenging. Patient admits she is not happy. Last visit we increase sertraline to 75 mg and says she has more \"okay days\" than bad days. She takes donepezil daily. Review of Systems Psychiatric/Behavioral: Positive for depression and memory loss. The patient is nervous/anxious. All other systems reviewed and are negative. Past Medical History:  
Diagnosis Date  Essential hypertension  Family history of skin cancer   
 brother  Radiation exposure   
 breast cancer left  Skin cancer Family History Problem Relation Age of Onset  No Known Problems Mother  No Known Problems Father Social History Social History  Marital status:  Spouse name: N/A  
 Number of children: N/A  
 Years of education: N/A Occupational History  Not on file. Social History Main Topics  Smoking status: Never Smoker  Smokeless tobacco: Never Used  Alcohol use 1.2 oz/week 1 Cans of beer, 1 Glasses of wine per week  Drug use: Not on file  Sexual activity: Not on file Other Topics Concern  Not on file Social History Narrative Allergies Allergen Reactions  Codeine Nausea Only  Penicillins Hives Current Outpatient Prescriptions Medication Sig  
 memantine (NAMENDA) 5 mg tablet Take 1 Tab by mouth two (2) times a day.  sertraline (ZOLOFT) 25 mg tablet Take 3 Tabs by mouth nightly.  donepezil (ARICEPT) 10 mg tablet Take 1 Tab by mouth nightly.  metFORMIN ER (GLUCOPHAGE XR) 500 mg tablet  HYDROmorphone (DILAUDID) 2 mg tablet  LORazepam (ATIVAN) 1 mg tablet  losartan-hydrochlorothiazide (HYZAAR) 100-12.5 mg per tablet  bupivacaine-EPINEPHrine (MARCAINE-EPINEPHRINE) 0.25 %-1:200,000 soln Used for mohs surgery  Indications: LOCAL ANESTHESIA FOR PROCEDURES No current facility-administered medications for this visit. Neurologic Exam  
 
Mental Status WD/WN adult in NAD, normal grooming VSS Awake and alert. Follows commands. Depressed mood. PERRL, nonicteric Face is symmetric, tongue midline Speech is fluent and clear No limb ataxia. No abnl movements. Moving all extemities spontaneously and symmetric Normal gait CVS RRR Lungs nonlabored Skin is warm and dry Visit Vitals  /78  Pulse 78  Resp 18  Wt 80.3 kg (177 lb)  SpO2 98%  BMI 26.14 kg/m2 Assessment and Plan Diagnoses and all orders for this visit: 1. Alzheimer's disease of other onset without behavioral disturbance 
-     REFERRAL TO NEUROPSYCHOLOGY 
-     REFERRAL TO PSYCHIATRY 2. Adjustment disorder, unspecified type 
-     REFERRAL TO NEUROPSYCHOLOGY 
-     REFERRAL TO PSYCHIATRY 3. Anxiety and depression 
-     REFERRAL TO NEUROPSYCHOLOGY 
-     REFERRAL TO PSYCHIATRY Other orders 
-     memantine (NAMENDA) 5 mg tablet; Take 1 Tab by mouth two (2) times a day. -     sertraline (ZOLOFT) 25 mg tablet; Take 3 Tabs by mouth nightly. -     donepezil (ARICEPT) 10 mg tablet; Take 1 Tab by mouth nightly. 27-year-old woman who has dementia that now is showing persistent depression following her change in residence. She seems to be isolating herself more now. In lieu of change in medication at this time for depression I would like her to complete neuropsychological testing to assess the degree of depression and anxiety we may be facing. I placed a psychiatry consult. Continue donepezil but add low-dose memantine daily. I would like to see her in 3 months or sooner if needed. Thank you for giving me the opportunity to assist in the care of Ms. Myah Rowe. If you have questions, please do not hesitate to contact me. Sincerely, 2 [facility], DO Neurologist 
Diplomate SHERIF

## 2018-06-01 NOTE — MR AVS SNAPSHOT
[address] 
[phone] Patient: Rhea Eagle MRN: [id number]:[id number] Visit Information Date & Time Provider Department Dept. Phone Encounter #  
 6/1/2018 11:20 AM DO [facility] at [address] [id number] Follow-up Instructions Return in about 3 months (around 9/1/2018). Upcoming Health Maintenance Date Due DTaP/Tdap/Td series (1 - Tdap) 9/25/1960 ZOSTER VACCINE AGE 60> 7/25/1999 GLAUCOMA SCREENING Q2Y 9/25/2004 Bone Densitometry (Dexa) Screening 9/25/2004 Pneumococcal 65+ Low/Medium Risk (1 of 2 - PCV13) 9/25/2004 MEDICARE YEARLY EXAM 3/14/2018 Influenza Age 5 to Adult 8/1/2018 Allergies as of 6/1/2018  Review Complete On: 3/16/2018 By: Audra Flannery DO Severity Noted Reaction Type Reactions Codeine  10/30/2015    Nausea Only Penicillins  10/30/2015    Hives Current Immunizations  Never Reviewed No immunizations on file. Not reviewed this visit You Were Diagnosed With   
  
 Codes Comments Alzheimer's disease of other onset without behavioral disturbance    -  Primary ICD-10-CM: G30.8, F02.80 ICD-9-CM: 331.0, 294.10 Adjustment disorder, unspecified type     ICD-10-CM: F43.20 ICD-9-CM: 309.9 Anxiety and depression     ICD-10-CM: F41.9, F32.9 ICD-9-CM: 300.00, 311 Vitals BP Pulse Resp Weight(growth percentile) SpO2 BMI  
 138/78 78 18 177 lb (80.3 kg) 98% 26.14 kg/m2 Smoking Status Never Smoker BMI and BSA Data Body Mass Index Body Surface Area  
 26.14 kg/m 2 1.98 m 2 Preferred Pharmacy Pharmacy Name Phone [address] [facility] [address] [facility] [address] [phone] Your Updated Medication List  
  
   
 This list is accurate as of 6/1/18 11:45 AM.  Always use your most recent med list.  
  
  
  
  
 bupivacaine-EPINEPHrine 0.25 %-1:200,000 Soln Commonly known as:  Dominiqueroman Anton Used for mohs surgery  Indications: LOCAL ANESTHESIA FOR PROCEDURES  
  
 donepezil 10 mg tablet Commonly known as:  ARICEPT Take 1 Tab by mouth nightly. HYDROmorphone 2 mg tablet Commonly known as:  DILAUDID LORazepam 1 mg tablet Commonly known as:  ATIVAN  
  
 losartan-hydroCHLOROthiazide 100-12.5 mg per tablet Commonly known as:  HYZAAR  
  
 memantine 5 mg tablet Commonly known as:  Heaven Brought Take 1 Tab by mouth two (2) times a day. metFORMIN  mg tablet Commonly known as:  GLUCOPHAGE XR  
  
 sertraline 25 mg tablet Commonly known as:  ZOLOFT Take 3 Tabs by mouth nightly. Prescriptions Sent to Pharmacy Refills  
 memantine (NAMENDA) 5 mg tablet 3 Sig: Take 1 Tab by mouth two (2) times a day. Class: Normal  
 Pharmacy: [address] [place] [address] Ph #: [phone] Route: Oral  
 sertraline (ZOLOFT) 25 mg tablet 3 Sig: Take 3 Tabs by mouth nightly. Class: Normal  
 Pharmacy: [address] [place] [address] Ph #: [phone] Route: Oral  
 donepezil (ARICEPT) 10 mg tablet 1 Sig: Take 1 Tab by mouth nightly. Class: Normal  
 Pharmacy: [address] [place] [address] Ph #: [phone] Route: Oral  
  
We Performed the Following REFERRAL TO NEUROPSYCHOLOGY [SIK20 Custom] REFERRAL TO PSYCHIATRY [REF91 Custom] Comments:  
 [facility], Dr. Minal Alfredo Follow-up Instructions Return in about 3 months (around 9/1/2018). Referral Information Referral ID Referred By Referred To  
  
 [id number] LETICIA, [facility], [address] [place] [address], [address] [facility] Phone: [phone] Fax: [phone] Visits Status Start Date End Date 1 New Request 6/1/18 6/1/19 If your referral has a status of pending review or denied, additional information will be sent to support the outcome of this decision. Referral ID Referred By Referred To  
 [id number] Aron Harris Not Available Visits Status Start Date End Date 1 New Request 6/1/18 6/1/19 If your referral has a status of pending review or denied, additional information will be sent to support the outcome of this decision. Patient Instructions A Healthy Lifestyle: Care Instructions Your Care Instructions A healthy lifestyle can help you feel good, stay at a healthy weight, and have plenty of energy for both work and play. A healthy lifestyle is something you can share with your whole family. A healthy lifestyle also can lower your risk for serious health problems, such as high blood pressure, heart disease, and diabetes. You can follow a few steps listed below to improve your health and the health of your family. Follow-up care is a key part of your treatment and safety. Be sure to make and go to all appointments, and call your doctor if you are having problems. It's also a good idea to know your test results and keep a list of the medicines you take. How can you care for yourself at home? · Do not eat too much sugar, fat, or fast foods. You can still have dessert and treats now and then. The goal is moderation. · Start small to improve your eating habits. Pay attention to portion sizes, drink less juice and soda pop, and eat more fruits and vegetables. ¨ Eat a healthy amount of food. A 3-ounce serving of meat, for example, is about the size of a deck of cards. Fill the rest of your plate with vegetables and whole grains. ¨ Limit the amount of soda and sports drinks you have every day. Drink more water when you are thirsty. ¨ Eat at least 5 servings of fruits and vegetables every day. It may seem like a lot, but it is not hard to reach this goal. A serving or helping is 1 piece of fruit, 1 cup of vegetables, or 2 cups of leafy, raw vegetables. Have an apple or some carrot sticks as an afternoon snack instead of a candy bar. Try to have fruits and/or vegetables at every meal. 
· Make exercise part of your daily routine. You may want to start with simple activities, such as walking, bicycling, or slow swimming. Try to be active 30 to 60 minutes every day. You do not need to do all 30 to 60 minutes all at once. For example, you can exercise 3 times a day for 10 or 20 minutes. Moderate exercise is safe for most people, but it is always a good idea to talk to your doctor before starting an exercise program. 
· Keep moving. Jodi Corrales the lawn, work in the garden, or [employer]. Take the stairs instead of the elevator at work. · If you smoke, quit. People who smoke have an increased risk for heart attack, stroke, cancer, and other lung illnesses. Quitting is hard, but there are ways to boost your chance of quitting tobacco for good. ¨ Use nicotine gum, patches, or lozenges. ¨ Ask your doctor about stop-smoking programs and medicines. ¨ Keep trying. In addition to reducing your risk of diseases in the future, you will notice some benefits soon after you stop using tobacco. If you have shortness of breath or asthma symptoms, they will likely get better within a few weeks after you quit. · Limit how much alcohol you drink. Moderate amounts of alcohol (up to 2 drinks a day for men, 1 drink a day for women) are okay. But drinking too much can lead to liver problems, high blood pressure, and other health problems. Family health If you have a family, there are many things you can do together to improve your health. · Eat meals together as a family as often as possible. · Eat healthy foods.  This includes fruits, vegetables, lean meats and dairy, and whole grains. · Include your family in your fitness plan. Most people think of activities such as jogging or tennis as the way to fitness, but there are many ways you and your family can be more active. Anything that makes you breathe hard and gets your heart pumping is exercise. Here are some tips: 
¨ Walk to do errands or to take your child to school or the bus. ¨ Go for a family bike ride after dinner instead of watching TV. Where can you learn more? Go to http://david-chantelle.info/. Enter D454 in the search box to learn more about \"A Healthy Lifestyle: Care Instructions. \" Current as of: May 12, 2017 Content Version: 11.4 © 1613-0699 [employer]. Care instructions adapted under license by [employer] (which disclaims liability or warranty for this information). If you have questions about a medical condition or this instruction, always ask your healthcare professional. [place] [address] any warranty or liability for your use of this information. Introducing [facility]! Ada Peterson introduces CouchOne patient portal. Now you can access parts of your medical record, email your doctor's office, and request medication refills online. 1. In your internet browser, go to [email or web address] 2. Click on the First Time User? Click Here link in the Sign In box. You will see the New Member Sign Up page. 3. Enter your CouchOne Access Code exactly as it appears below. You will not need to use this code after youve completed the sign-up process. If you do not sign up before the expiration date, you must request a new code. · CouchOne Access Code: [email or web address] Expires: 8/30/2018 11:26 AM 
 
4. Enter the last four digits of your Social Security Number (xxxx) and Date of Birth (mm/dd/yyyy) as indicated and click Submit. You will be taken to the next sign-up page. 5. Create a Bug Music ID. This will be your Bug Music login ID and cannot be changed, so think of one that is secure and easy to remember. 6. Create a Bug Music password. You can change your password at any time. 7. Enter your Password Reset Question and Answer. This can be used at a later time if you forget your password. 8. Enter your e-mail address. You will receive e-mail notification when new information is available in [address] 9. Click Sign Up. You can now view and download portions of your medical record. 10. Click the Download Summary menu link to download a portable copy of your medical information. If you have questions, please visit the Frequently Asked Questions section of the Bug Music website. Remember, [employer] Music is NOT to be used for urgent needs. For medical emergencies, dial 911. Now available from your iPhone and Android! Please provide this summary of care documentation to your next provider. Your primary care clinician is listed as Ariel Holder. If you have any questions after today's visit, please call [phone].

## 2018-06-01 NOTE — PROGRESS NOTES
Chief Complaint   Patient presents with    Memory Loss       HPI    76-year-old woman with dementia here to follow-up. Since her last visit she has transitioned into an assisted living facility. She is still not happy with this change and finds herself very socially withdrawn at times. She does not go on the weekly field trips shopping. She walks her dog alone a few times a day. Medications are prepared for her via a new electronic dispenser. Mood is still barely okay. Daughter reports that the first month and the new residence was very challenging. Patient admits she is not happy. Last visit we increase sertraline to 75 mg and says she has more \"okay days\" than bad days. She takes donepezil daily. Review of Systems   Psychiatric/Behavioral: Positive for depression and memory loss. The patient is nervous/anxious. All other systems reviewed and are negative. Past Medical History:   Diagnosis Date    Essential hypertension     Family history of skin cancer     brother    Radiation exposure     breast cancer left    Skin cancer      Family History   Problem Relation Age of Onset    No Known Problems Mother     No Known Problems Father      Social History     Social History    Marital status:      Spouse name: N/A    Number of children: N/A    Years of education: N/A     Occupational History    Not on file. Social History Main Topics    Smoking status: Never Smoker    Smokeless tobacco: Never Used    Alcohol use 1.2 oz/week     1 Cans of beer, 1 Glasses of wine per week    Drug use: Not on file    Sexual activity: Not on file     Other Topics Concern    Not on file     Social History Narrative     Allergies   Allergen Reactions    Codeine Nausea Only    Penicillins Hives         Current Outpatient Prescriptions   Medication Sig    memantine (NAMENDA) 5 mg tablet Take 1 Tab by mouth two (2) times a day.     sertraline (ZOLOFT) 25 mg tablet Take 3 Tabs by mouth nightly.  donepezil (ARICEPT) 10 mg tablet Take 1 Tab by mouth nightly.  metFORMIN ER (GLUCOPHAGE XR) 500 mg tablet     HYDROmorphone (DILAUDID) 2 mg tablet     LORazepam (ATIVAN) 1 mg tablet     losartan-hydrochlorothiazide (HYZAAR) 100-12.5 mg per tablet     bupivacaine-EPINEPHrine (MARCAINE-EPINEPHRINE) 0.25 %-1:200,000 soln Used for mohs surgery  Indications: LOCAL ANESTHESIA FOR PROCEDURES     No current facility-administered medications for this visit. Neurologic Exam     Mental Status        WD/WN adult in NAD, normal grooming  VSS  Awake and alert. Follows commands. Depressed mood. PERRL, nonicteric  Face is symmetric, tongue midline  Speech is fluent and clear  No limb ataxia. No abnl movements. Moving all extemities spontaneously and symmetric  Normal gait    CVS RRR  Lungs nonlabored  Skin is warm and dry         Visit Vitals    /78    Pulse 78    Resp 18    Wt 80.3 kg (177 lb)    SpO2 98%    BMI 26.14 kg/m2       Assessment and Plan   Diagnoses and all orders for this visit:    1. Alzheimer's disease of other onset without behavioral disturbance  -     REFERRAL TO NEUROPSYCHOLOGY  -     REFERRAL TO PSYCHIATRY    2. Adjustment disorder, unspecified type  -     REFERRAL TO NEUROPSYCHOLOGY  -     REFERRAL TO PSYCHIATRY    3. Anxiety and depression  -     REFERRAL TO NEUROPSYCHOLOGY  -     REFERRAL TO PSYCHIATRY    Other orders  -     memantine (NAMENDA) 5 mg tablet; Take 1 Tab by mouth two (2) times a day. -     sertraline (ZOLOFT) 25 mg tablet; Take 3 Tabs by mouth nightly. -     donepezil (ARICEPT) 10 mg tablet; Take 1 Tab by mouth nightly. 50-year-old woman who has dementia that now is showing persistent depression following her change in residence. She seems to be isolating herself more now.   In lieu of change in medication at this time for depression I would like her to complete neuropsychological testing to assess the degree of depression and anxiety we may be facing. I placed a psychiatry consult. Continue donepezil but add low-dose memantine daily. I would like to see her in 3 months or sooner if needed.       Bedelia Flight, [address]  Diplomate DEMIANN

## 2018-06-01 NOTE — COMMUNICATION BODY
Chief Complaint   Patient presents with    Memory Loss       HPI    69-year-old woman with dementia here to follow-up. Since her last visit she has transitioned into an assisted living facility. She is still not happy with this change and finds herself very socially withdrawn at times. She does not go on the weekly field trips shopping. She walks her dog alone a few times a day. Medications are prepared for her via a new electronic dispenser. Mood is still barely okay. Daughter reports that the first month and the new residence was very challenging. Patient admits she is not happy. Last visit we increase sertraline to 75 mg and says she has more \"okay days\" than bad days. She takes donepezil daily. Review of Systems   Psychiatric/Behavioral: Positive for depression and memory loss. The patient is nervous/anxious. All other systems reviewed and are negative. Past Medical History:   Diagnosis Date    Essential hypertension     Family history of skin cancer     brother    Radiation exposure     breast cancer left    Skin cancer      Family History   Problem Relation Age of Onset    No Known Problems Mother     No Known Problems Father      Social History     Social History    Marital status:      Spouse name: N/A    Number of children: N/A    Years of education: N/A     Occupational History    Not on file. Social History Main Topics    Smoking status: Never Smoker    Smokeless tobacco: Never Used    Alcohol use 1.2 oz/week     1 Cans of beer, 1 Glasses of wine per week    Drug use: Not on file    Sexual activity: Not on file     Other Topics Concern    Not on file     Social History Narrative     Allergies   Allergen Reactions    Codeine Nausea Only    Penicillins Hives         Current Outpatient Prescriptions   Medication Sig    memantine (NAMENDA) 5 mg tablet Take 1 Tab by mouth two (2) times a day.     sertraline (ZOLOFT) 25 mg tablet Take 3 Tabs by mouth nightly.  donepezil (ARICEPT) 10 mg tablet Take 1 Tab by mouth nightly.  metFORMIN ER (GLUCOPHAGE XR) 500 mg tablet     HYDROmorphone (DILAUDID) 2 mg tablet     LORazepam (ATIVAN) 1 mg tablet     losartan-hydrochlorothiazide (HYZAAR) 100-12.5 mg per tablet     bupivacaine-EPINEPHrine (MARCAINE-EPINEPHRINE) 0.25 %-1:200,000 soln Used for mohs surgery  Indications: LOCAL ANESTHESIA FOR PROCEDURES     No current facility-administered medications for this visit. Neurologic Exam     Mental Status        WD/WN adult in NAD, normal grooming  VSS  Awake and alert. Follows commands. Depressed mood. PERRL, nonicteric  Face is symmetric, tongue midline  Speech is fluent and clear  No limb ataxia. No abnl movements. Moving all extemities spontaneously and symmetric  Normal gait    CVS RRR  Lungs nonlabored  Skin is warm and dry         Visit Vitals    /78    Pulse 78    Resp 18    Wt 80.3 kg (177 lb)    SpO2 98%    BMI 26.14 kg/m2       Assessment and Plan   Diagnoses and all orders for this visit:    1. Alzheimer's disease of other onset without behavioral disturbance  -     REFERRAL TO NEUROPSYCHOLOGY  -     REFERRAL TO PSYCHIATRY    2. Adjustment disorder, unspecified type  -     REFERRAL TO NEUROPSYCHOLOGY  -     REFERRAL TO PSYCHIATRY    3. Anxiety and depression  -     REFERRAL TO NEUROPSYCHOLOGY  -     REFERRAL TO PSYCHIATRY    Other orders  -     memantine (NAMENDA) 5 mg tablet; Take 1 Tab by mouth two (2) times a day. -     sertraline (ZOLOFT) 25 mg tablet; Take 3 Tabs by mouth nightly. -     donepezil (ARICEPT) 10 mg tablet; Take 1 Tab by mouth nightly. 74-year-old woman who has dementia that now is showing persistent depression following her change in residence. She seems to be isolating herself more now.   In lieu of change in medication at this time for depression I would like her to complete neuropsychological testing to assess the degree of depression and anxiety we may be facing. I placed a psychiatry consult. Continue donepezil but add low-dose memantine daily. I would like to see her in 3 months or sooner if needed.       2 [facility], [facility] [address]  Diplomate DEMIANN

## 2018-09-07 ENCOUNTER — DOCUMENTATION ONLY (OUTPATIENT)
Dept: NEUROLOGY | Age: 78
End: 2018-09-07

## 2018-09-10 NOTE — TELEPHONE ENCOUNTER
Message forwarded to wrong location by [facility]. ----- Message from Rebecca Saucedo sent at 9/10/2018 11:36 AM EDT -----  Regarding: Dr. Shreyas Plascencia  Pt's daughter(Alix Mcgill) [facility]ill on pt's Rx(\"Namenda\") a wk ago, and no response from the doctor. Pt 's daughter stated she would like the doctor to approve the Rx before pt is out of medication. Best contact number [phone].

## 2018-09-11 RX ORDER — MEMANTINE HYDROCHLORIDE 5 MG/1
5 TABLET ORAL 2 TIMES DAILY
Qty: 60 TAB | Refills: 3 | Status: SHIPPED | OUTPATIENT
Start: 2018-09-11 | End: 2018-09-17 | Stop reason: SDUPTHER

## 2018-09-11 RX ORDER — MEMANTINE HYDROCHLORIDE 5 MG/1
5 TABLET ORAL 2 TIMES DAILY
Qty: 60 TAB | Refills: 3 | Status: SHIPPED | OUTPATIENT
Start: 2018-09-11 | End: 2018-09-11 | Stop reason: SDUPTHER

## 2018-09-11 NOTE — TELEPHONE ENCOUNTER
----- Message from [facility] sent at 9/11/2018 12:03 PM EDT -----  Regarding: Michael Olivera refill  Kevin Barajas, daughter, called because the pt is almost out of her \"namenda\" medication, and she would like it sent to a local pharmacy so she doesn't have to stop taking it. The Rx should be sent to [phone]. Best contact number is [phone].

## 2018-09-11 NOTE — TELEPHONE ENCOUNTER
[facility] [place]      P)  [phone]    F)  [phone]      Will fax script once I get it.

## 2018-09-17 ENCOUNTER — OFFICE VISIT (OUTPATIENT)
Dept: NEUROLOGY | Age: 78
End: 2018-09-17

## 2018-09-17 VITALS
DIASTOLIC BLOOD PRESSURE: 68 MMHG | SYSTOLIC BLOOD PRESSURE: 118 MMHG | BODY MASS INDEX: 25.92 KG/M2 | WEIGHT: 175 LBS | HEIGHT: 69 IN | OXYGEN SATURATION: 97 % | RESPIRATION RATE: 16 BRPM | HEART RATE: 70 BPM

## 2018-09-17 DIAGNOSIS — F02.80 ALZHEIMER'S DISEASE OF OTHER ONSET WITHOUT BEHAVIORAL DISTURBANCE: Primary | ICD-10-CM

## 2018-09-17 DIAGNOSIS — F43.20 ADJUSTMENT DISORDER, UNSPECIFIED TYPE: ICD-10-CM

## 2018-09-17 DIAGNOSIS — G30.8 ALZHEIMER'S DISEASE OF OTHER ONSET WITHOUT BEHAVIORAL DISTURBANCE: Primary | ICD-10-CM

## 2018-09-17 RX ORDER — SERTRALINE HYDROCHLORIDE 100 MG/1
100 TABLET, FILM COATED ORAL
Qty: 90 TAB | Refills: 1 | Status: SHIPPED | OUTPATIENT
Start: 2018-09-17 | End: 2018-12-03 | Stop reason: SDUPTHER

## 2018-09-17 RX ORDER — MEMANTINE HYDROCHLORIDE 10 MG/1
10 TABLET ORAL 2 TIMES DAILY
Qty: 180 TAB | Refills: 1 | Status: SHIPPED | OUTPATIENT
Start: 2018-09-17 | End: 2018-09-26 | Stop reason: SDUPTHER

## 2018-09-17 RX ORDER — CEPHALEXIN 500 MG/1
500 CAPSULE ORAL 4 TIMES DAILY
COMMUNITY
End: 2019-10-18

## 2018-09-17 NOTE — MR AVS SNAPSHOT
[place] [address] [address] [place] [address] 
[phone] Patient: Rita Aquino MRN: [id number] Visit Information Date & Time Provider Department Dept. Phone Encounter #  
 9/17/2018  2:40  [facility], [address] [facility] at [address] [id number] [id number] Follow-up Instructions Return in about 3 months (around 12/17/2018). Upcoming Health Maintenance Date Due DTaP/Tdap/Td series (1 - Tdap) 9/25/1960 ZOSTER VACCINE AGE 60> 7/25/1999 GLAUCOMA SCREENING Q2Y 9/25/2004 Bone Densitometry (Dexa) Screening 9/25/2004 Pneumococcal 65+ Low/Medium Risk (1 of 2 - PCV13) 9/25/2004 MEDICARE YEARLY EXAM 3/14/2018 Influenza Age 5 to Adult 8/1/2018 Allergies as of 9/17/2018  Review Complete On: 9/17/2018 By: Odilia Poon LPN Severity Noted Reaction Type Reactions Codeine  10/30/2015    Nausea Only Penicillins  10/30/2015    Hives Current Immunizations  Reviewed on 9/17/2018 No immunizations on file. Reviewed by Odilia Poon LPN on 3/01/2602 at  2:58 PM  
You Were Diagnosed With   
  
 Codes Comments Alzheimer's disease of other onset without behavioral disturbance    -  Primary ICD-10-CM: G30.8, F02.80 ICD-9-CM: 331.0, 294.10 Adjustment disorder, unspecified type     ICD-10-CM: F43.20 ICD-9-CM: 309.9 Vitals BP Pulse Resp Height(growth percentile) Weight(growth percentile) SpO2  
 118/68 (BP 1 Location: Right arm, BP Patient Position: Sitting) 70 16 5' 9\" (1.753 m) 175 lb (79.4 kg) 97% BMI Smoking Status 25.84 kg/m2 Never Smoker Vitals History BMI and BSA Data Body Mass Index Body Surface Area  
 25.84 kg/m 2 1.97 m 2 Preferred Pharmacy Pharmacy Name Phone [address] [facility] [address] [facility] [address] [phone] Your Updated Medication List  
  
   
This list is accurate as of 9/17/18  3:25 PM.  Always use your most recent med list.  
  
  
  
  
 bupivacaine-EPINEPHrine 0.25 %-1:200,000 Soln Commonly known as:  Andrey Mercado Used for mohs surgery  Indications: LOCAL ANESTHESIA FOR PROCEDURES  
  
 cephALEXin 500 mg capsule Commonly known as:  Gearold Spry Take 500 mg by mouth four (4) times daily. donepezil 10 mg tablet Commonly known as:  ARICEPT Take 1 Tab by mouth nightly. HYDROmorphone 2 mg tablet Commonly known as:  DILAUDID LORazepam 1 mg tablet Commonly known as:  ATIVAN  
  
 losartan-hydroCHLOROthiazide 100-12.5 mg per tablet Commonly known as:  HYZAAR  
  
 memantine 10 mg tablet Commonly known as:  Acey Pupa Take 1 Tab by mouth two (2) times a day. metFORMIN  mg tablet Commonly known as:  GLUCOPHAGE XR  
  
 sertraline 100 mg tablet Commonly known as:  ZOLOFT Take 1 Tab by mouth nightly. Prescriptions Sent to Pharmacy Refills  
 sertraline (ZOLOFT) 100 mg tablet 1 Sig: Take 1 Tab by mouth nightly. Class: Normal  
 Pharmacy: [address] [place] [address] Ph #: [phone] Route: Oral  
 memantine (NAMENDA) 10 mg tablet 1 Sig: Take 1 Tab by mouth two (2) times a day. Class: Normal  
 Pharmacy: [address] [place] [address] Ph #: [phone] Route: Oral  
  
Follow-up Instructions Return in about 3 months (around 12/17/2018). Introducing \[facility] [place] [facility]\"" & HEALTH SERVICES! [facility] introduces [employer] patient portal. Now you can access parts of your medical record, email your doctor's office, and request medication refills online. 1. In your internet browser, go to [email or web address] 2. Click on the First Time User? Click Here link in the Sign In box. You will see the New Member Sign Up page. 3. Enter your [employer] Access Code exactly as it appears below. You will not need to use this code after youve completed the sign-up process. If you do not sign up before the expiration date, you must request a new code. · [employer] Access Code: [email or web address] Expires: 12/16/2018  2:37 PM 
 
4. Enter the last four digits of your Social Security Number (xxxx) and Date of Birth (mm/dd/yyyy) as indicated and click Submit. You will be taken to the next sign-up page. 5. Create a [employer] ID. This will be your [employer] login ID and cannot be changed, so think of one that is secure and easy to remember. 6. Create a [employer] password. You can change your password at any time. 7. Enter your Password Reset Question and Answer. This can be used at a later time if you forget your password. 8. Enter your e-mail address. You will receive e-mail notification when new information is available in [address]. 9. Click Sign Up. You can now view and download portions of your medical record. 10. Click the Download Summary menu link to download a portable copy of your medical information. If you have questions, please visit the Frequently Asked Questions section of the [employer] website. Remember, [employer] is NOT to be used for urgent needs. For medical emergencies, dial 911. Now available from your iPhone and Android! Please provide this summary of care documentation to your next provider. Your primary care clinician is listed as David Barrera. If you have any questions after today's visit, please call [phone].

## 2018-09-17 NOTE — PROGRESS NOTES
Identified pt with two pt identifiers(name and ). Reviewed record in preparation for visit and have obtained necessary documentation. Chief Complaint Patient presents with  Memory Loss 3 month follow up Health Maintenance Due Topic  DTaP/Tdap/Td series (1 - Tdap)  ZOSTER VACCINE AGE 60>   
 GLAUCOMA SCREENING Q2Y  Bone Densitometry (Dexa) Screening  Pneumococcal 65+ Low/Medium Risk (1 of 2 - PCV13)  MEDICARE YEARLY EXAM   
 Influenza Age 5 to Adult Coordination of Care Questionnaire: 
:  
1) Have you been to an emergency room, urgent care, or hospitalized since your last visit?   no If yes, where when, and reason for visit? 2. Have seen or consulted any other health care provider since your last visit? NO If yes, where when, and reason for visit? 3) Do you have an Advanced Directive/ Living Will in place? NO If yes, do we have a copy on file NO If no, would you like information NO Patient is accompanied by daughter I have received verbal consent from Pascale Da Silva to discuss any/all medical information while they are present in the room. Visit Vitals  /68 (BP 1 Location: Right arm, BP Patient Position: Sitting)  Pulse 70  Resp 16  
 Ht 5' 9\" (1.753 m)  Wt 79.4 kg (175 lb)  SpO2 97%  BMI 25.84 kg/m2 Silvino Cornejo LPN

## 2018-09-17 NOTE — LETTER
9/17/2018 Patient:  Justice Norton YOB: 1940 Date of Visit: 9/17/2018 Dear Karissa Choudhury DO 
[address] [place] [address] [facility] [address] VIA Facsimile: [phone] 
 : 
 
 
I was requested by Karissa Choudhury DO to evaluate Ms. Justice Norton  for Chief Complaint Patient presents with  Memory Loss 3 month follow up Rosanne Houston I am recommending the following:  
 
Diagnoses and all orders for this visit: 1. Alzheimer's disease of other onset without behavioral disturbance 2. Adjustment disorder, unspecified type Other orders 
-     sertraline (ZOLOFT) 100 mg tablet; Take 1 Tab by mouth nightly. -     memantine (NAMENDA) 10 mg tablet; Take 1 Tab by mouth two (2) times a day. 
 
 
 
---------------------------------------------------------------------------------------------------------------------- Below is my encounter: Chief Complaint Patient presents with  Memory Loss 3 month follow up HPI 
 
69-year-old woman with likely Alzheimer's here to follow-up. Since her last visit she remains at her new residence at an [facility]. She is learning to meet new people now. She walks her dog daily. Her daughter is present. Her daughter is having a lot of frustration with her mother because recently she has not been eating meals and she is forgetting her medications. Of late, somebody has been hired to help with that in the mornings now. She also had neuropsych testing repeated showing a decline in overall function and severe major depression complicating her presentation. She tells me she sleeps well. She does not have much conversation. Review of Systems Unable to perform ROS: Dementia Psychiatric/Behavioral: Positive for depression and memory loss. The patient is nervous/anxious. All other systems reviewed and are negative. Past Medical History:  
Diagnosis Date  Essential hypertension  Family history of skin cancer brother  Radiation exposure   
 breast cancer left  Skin cancer Family History Problem Relation Age of Onset  No Known Problems Mother  No Known Problems Father Social History Social History  Marital status:  Spouse name: N/A  
 Number of children: N/A  
 Years of education: N/A Occupational History  Not on file. Social History Main Topics  Smoking status: Never Smoker  Smokeless tobacco: Never Used  Alcohol use 1.2 oz/week 1 Cans of beer, 1 Glasses of wine per week  Drug use: Not on file  Sexual activity: Not on file Other Topics Concern  Not on file Social History Narrative Allergies Allergen Reactions  Codeine Nausea Only  Penicillins Hives Current Outpatient Prescriptions Medication Sig  cephALEXin (KEFLEX) 500 mg capsule Take 500 mg by mouth four (4) times daily.  sertraline (ZOLOFT) 100 mg tablet Take 1 Tab by mouth nightly.  memantine (NAMENDA) 10 mg tablet Take 1 Tab by mouth two (2) times a day.  donepezil (ARICEPT) 10 mg tablet Take 1 Tab by mouth nightly.  metFORMIN ER (GLUCOPHAGE XR) 500 mg tablet  losartan-hydrochlorothiazide (HYZAAR) 100-12.5 mg per tablet  HYDROmorphone (DILAUDID) 2 mg tablet  LORazepam (ATIVAN) 1 mg tablet  bupivacaine-EPINEPHrine (MARCAINE-EPINEPHRINE) 0.25 %-1:200,000 soln Used for mohs surgery  Indications: LOCAL ANESTHESIA FOR PROCEDURES No current facility-administered medications for this visit. Neurologic Exam  
 
Mental Status WD/WN adult in NAD, normal grooming VSS 
A&O x 3 PERRL, nonicteric Face is symmetric, tongue midline Speech is fluent and clear No limb ataxia. No abnl movements. Moving all extemities spontaneously and symmetric Normal gait CVS RRR Lungs nonlabored Skin is warm and dry Visit Vitals  /68 (BP 1 Location: Right arm, BP Patient Position: Sitting)  Pulse 70  Resp 16  
 Ht 5' 9\" (1.753 m)  Wt 79.4 kg (175 lb)  SpO2 97%  BMI 25.84 kg/m2 Assessment and Plan Diagnoses and all orders for this visit: 1. Alzheimer's disease of other onset without behavioral disturbance 2. Adjustment disorder, unspecified type Other orders 
-     sertraline (ZOLOFT) 100 mg tablet; Take 1 Tab by mouth nightly. -     memantine (NAMENDA) 10 mg tablet; Take 1 Tab by mouth two (2) times a day. 78-year-old woman who likely has Alzheimer's complicated by major depression. She still is adjusting to the changes she is going through. She is also having difficulty remembering to eat breakfast and take her medications so help is now required. I am going to increase memantine to 10 mg twice a day and also increase Zoloft to a more therapeutic 100 mg. Continue donepezil. I discussed with her and her daughter that we need to be aggressive on managing her depression and mood changes as that will worsen her cognitive function. I will see her in 3 months. Thank you for giving me the opportunity to assist in the care of Ms. Kelsi Rojas. If you have questions, please do not hesitate to contact me. Sincerely, [facility], DO Neurologist 
Diplomate SHERIF

## 2018-09-17 NOTE — PROGRESS NOTES
Chief Complaint Patient presents with  Memory Loss 3 month follow up HPI 
 
76-year-old woman with likely Alzheimer's here to follow-up. Since her last visit she remains at her new residence at an [facility]. She is learning to meet new people now. She walks her dog daily. Her daughter is present. Her daughter is having a lot of frustration with her mother because recently she has not been eating meals and she is forgetting her medications. Of late, somebody has been hired to help with that in the mornings now. She also had neuropsych testing repeated showing a decline in overall function and severe major depression complicating her presentation. She tells me she sleeps well. She does not have much conversation. Review of Systems Unable to perform ROS: Dementia Psychiatric/Behavioral: Positive for depression and memory loss. The patient is nervous/anxious. All other systems reviewed and are negative. Past Medical History:  
Diagnosis Date  Essential hypertension  Family history of skin cancer   
 brother  Radiation exposure   
 breast cancer left  Skin cancer Family History Problem Relation Age of Onset  No Known Problems Mother  No Known Problems Father Social History Social History  Marital status:  Spouse name: N/A  
 Number of children: N/A  
 Years of education: N/A Occupational History  Not on file. Social History Main Topics  Smoking status: Never Smoker  Smokeless tobacco: Never Used  Alcohol use 1.2 oz/week 1 Cans of beer, 1 Glasses of wine per week  Drug use: Not on file  Sexual activity: Not on file Other Topics Concern  Not on file Social History Narrative Allergies Allergen Reactions  Codeine Nausea Only  Penicillins Hives Current Outpatient Prescriptions Medication Sig  cephALEXin (KEFLEX) 500 mg capsule Take 500 mg by mouth four (4) times daily.  sertraline (ZOLOFT) 100 mg tablet Take 1 Tab by mouth nightly.  memantine (NAMENDA) 10 mg tablet Take 1 Tab by mouth two (2) times a day.  donepezil (ARICEPT) 10 mg tablet Take 1 Tab by mouth nightly.  metFORMIN ER (GLUCOPHAGE XR) 500 mg tablet  losartan-hydrochlorothiazide (HYZAAR) 100-12.5 mg per tablet  HYDROmorphone (DILAUDID) 2 mg tablet  LORazepam (ATIVAN) 1 mg tablet  bupivacaine-EPINEPHrine (MARCAINE-EPINEPHRINE) 0.25 %-1:200,000 soln Used for mohs surgery  Indications: LOCAL ANESTHESIA FOR PROCEDURES No current facility-administered medications for this visit. Neurologic Exam  
 
Mental Status WD/WN adult in NAD, normal grooming VSS 
A&O x 3 PERRL, nonicteric Face is symmetric, tongue midline Speech is fluent and clear No limb ataxia. No abnl movements. Moving all extemities spontaneously and symmetric Normal gait CVS RRR Lungs nonlabored Skin is warm and dry Visit Vitals  /68 (BP 1 Location: Right arm, BP Patient Position: Sitting)  Pulse 70  Resp 16  
 Ht 5' 9\" (1.753 m)  Wt 79.4 kg (175 lb)  SpO2 97%  BMI 25.84 kg/m2 Assessment and Plan Diagnoses and all orders for this visit: 1. Alzheimer's disease of other onset without behavioral disturbance 2. Adjustment disorder, unspecified type Other orders 
-     sertraline (ZOLOFT) 100 mg tablet; Take 1 Tab by mouth nightly. -     memantine (NAMENDA) 10 mg tablet; Take 1 Tab by mouth two (2) times a day. 76-year-old woman who likely has Alzheimer's complicated by major depression. She still is adjusting to the changes she is going through. She is also having difficulty remembering to eat breakfast and take her medications so help is now required. I am going to increase memantine to 10 mg twice a day and also increase Zoloft to a more therapeutic 100 mg. Continue donepezil.   I discussed with her and her daughter that we need to be aggressive on managing her depression and mood changes as that will worsen her cognitive function. I will see her in 3 months. [place] DO 
NEUROLOGIST Diplomate DEMIANN

## 2018-09-26 RX ORDER — MEMANTINE HYDROCHLORIDE 10 MG/1
10 TABLET ORAL 2 TIMES DAILY
Qty: 180 TAB | Refills: 1 | Status: SHIPPED | OUTPATIENT
Start: 2018-09-26 | End: 2018-12-03 | Stop reason: ALTCHOICE

## 2018-12-03 ENCOUNTER — OFFICE VISIT (OUTPATIENT)
Dept: NEUROLOGY | Age: 78
End: 2018-12-03

## 2018-12-03 VITALS
SYSTOLIC BLOOD PRESSURE: 112 MMHG | RESPIRATION RATE: 16 BRPM | OXYGEN SATURATION: 96 % | WEIGHT: 172.4 LBS | BODY MASS INDEX: 25.53 KG/M2 | DIASTOLIC BLOOD PRESSURE: 60 MMHG | HEIGHT: 69 IN | HEART RATE: 62 BPM

## 2018-12-03 DIAGNOSIS — F41.9 ANXIETY AND DEPRESSION: ICD-10-CM

## 2018-12-03 DIAGNOSIS — F32.A ANXIETY AND DEPRESSION: ICD-10-CM

## 2018-12-03 DIAGNOSIS — F02.80 ALZHEIMER'S DISEASE OF OTHER ONSET WITHOUT BEHAVIORAL DISTURBANCE: Primary | ICD-10-CM

## 2018-12-03 DIAGNOSIS — G30.8 ALZHEIMER'S DISEASE OF OTHER ONSET WITHOUT BEHAVIORAL DISTURBANCE: Primary | ICD-10-CM

## 2018-12-03 RX ORDER — SERTRALINE HYDROCHLORIDE 100 MG/1
100 TABLET, FILM COATED ORAL
Qty: 90 TAB | Refills: 1 | Status: SHIPPED | OUTPATIENT
Start: 2018-12-03 | End: 2019-10-11 | Stop reason: SDUPTHER

## 2018-12-03 NOTE — PROGRESS NOTES
Chief Complaint   Patient presents with    Memory Loss       HPI    Jocy Soto is a 76-year-old woman with likely Alzheimer's dementia and major depression here to follow-up. She lives at an assisted living facility independently mostly. Her level of function is that her ADLs are intact however she needs someone assisting in the morning preparing and administering her medication. Symptoms have progressed such that she had some difficulty with her nighttime medicines now and family is currently looking for assistance with nighttime medication. Patient tells me her sleep is good. She denies excessive depression but daughter reports a lot of down days. Patient tries to be active with friends and walks her dog daily. No vivid dreams. No falls. She uses a rolling walker. Review of Systems   Neurological: Negative for focal weakness. Psychiatric/Behavioral: Positive for depression and memory loss. Negative for suicidal ideas. All other systems reviewed and are negative. Past Medical History:   Diagnosis Date    Essential hypertension     Family history of skin cancer     brother    Radiation exposure     breast cancer left    Skin cancer      Family History   Problem Relation Age of Onset    No Known Problems Mother     No Known Problems Father      Social History     Socioeconomic History    Marital status:      Spouse name: Not on file    Number of children: Not on file    Years of education: Not on file    Highest education level: Not on file   Social Needs    Financial resource strain: Not on file    Food insecurity - worry: Not on file    Food insecurity - inability: Not on file   [employer] needs - medical: Not on file   [employer] needs - non-medical: Not on file   Occupational History    Not on file   Tobacco Use    Smoking status: Never Smoker    Smokeless tobacco: Never Used   Substance and Sexual Activity    Alcohol use:  Yes     Alcohol/week: 1.2 oz     Types: 1 Cans of beer, 1 Glasses of wine per week    Drug use: Not on file    Sexual activity: Not on file   Other Topics Concern    Not on file   Social History Narrative    Not on file     Allergies   Allergen Reactions    Codeine Nausea Only    Penicillins Hives         Current Outpatient Medications   Medication Sig    sertraline (ZOLOFT) 100 mg tablet Take 1 Tab by mouth nightly.  memantine-donepezil (NAMZARIC) 28-10 mg CSpX Take 1 Cap by mouth nightly.  memantine-donepezil (NAMZARIC) 28-10 mg CSpX Take 1 Cap by mouth nightly.  metFORMIN ER (GLUCOPHAGE XR) 500 mg tablet     losartan-hydrochlorothiazide (HYZAAR) 100-12.5 mg per tablet     cephALEXin (KEFLEX) 500 mg capsule Take 500 mg by mouth four (4) times daily.  HYDROmorphone (DILAUDID) 2 mg tablet     LORazepam (ATIVAN) 1 mg tablet     bupivacaine-EPINEPHrine (MARCAINE-EPINEPHRINE) 0.25 %-1:200,000 soln Used for mohs surgery  Indications: LOCAL ANESTHESIA FOR PROCEDURES     No current facility-administered medications for this visit. Neurologic Exam     Mental Status   WD/WN adult in NAD, normal grooming  VSS  A&O, smiling but does admit to some down days, she shows some irritation when talking to her daughter    PERRL, nonicteric  Face is symmetric, tongue midline  Speech is minimal but clear  No limb ataxia. No abnl movements. Moving all extemities spontaneously and symmetric  Slightly wide cautious gait with a rolling walker    CVS RRR  Lungs nonlabored  Skin is warm and dry         Visit Vitals  /60   Pulse 62   Resp 16   Ht 5' 9\" (1.753 m)   Wt 78.2 kg (172 lb 6.4 oz)   SpO2 96%   BMI 25.46 kg/m²       Assessment and Plan   Diagnoses and all orders for this visit:    1. Alzheimer's disease of other onset without behavioral disturbance    2. Anxiety and depression    Other orders  -     sertraline (ZOLOFT) 100 mg tablet; Take 1 Tab by mouth nightly.   -     memantine-donepezil (NAMZARIC) 28-10 mg CSpX; Take 1 Cap by mouth nightly. -     memantine-donepezil (NAMZARIC) 28-10 mg CSpX; Take 1 Cap by mouth nightly. 76-year-old woman with likely Alzheimer's having some slight progression now that she needs assistance with medications full-time not just in the morning. I am going to have her transition to combination Namenda and Aricept to reduce her pill burden. Continue Zoloft 100 mg. Continue daily physical mental exercises. Watch for worsening depression. I will see her in 6 months.   And optimize therapy as much as possible        [address] [facility], [address] ABPN

## 2018-12-03 NOTE — PATIENT INSTRUCTIONS
A Healthy Lifestyle: Care Instructions  Your Care Instructions    A healthy lifestyle can help you feel good, stay at a healthy weight, and have plenty of energy for both work and play. A healthy lifestyle is something you can share with your whole family. A healthy lifestyle also can lower your risk for serious health problems, such as high blood pressure, heart disease, and diabetes. You can follow a few steps listed below to improve your health and the health of your family. Follow-up care is a key part of your treatment and safety. Be sure to make and go to all appointments, and call your doctor if you are having problems. It's also a good idea to know your test results and keep a list of the medicines you take. How can you care for yourself at home? · Do not eat too much sugar, fat, or fast foods. You can still have dessert and treats now and then. The goal is moderation. · Start small to improve your eating habits. Pay attention to portion sizes, drink less juice and soda pop, and eat more fruits and vegetables. ? Eat a healthy amount of food. A 3-ounce serving of meat, for example, is about the size of a deck of cards. Fill the rest of your plate with vegetables and whole grains. ? Limit the amount of soda and sports drinks you have every day. Drink more water when you are thirsty. ? Eat at least 5 servings of fruits and vegetables every day. It may seem like a lot, but it is not hard to reach this goal. A serving or helping is 1 piece of fruit, 1 cup of vegetables, or 2 cups of leafy, raw vegetables. Have an apple or some carrot sticks as an afternoon snack instead of a candy bar. Try to have fruits and/or vegetables at every meal.  · Make exercise part of your daily routine. You may want to start with simple activities, such as walking, bicycling, or slow swimming. Try to be active 30 to 60 minutes every day. You do not need to do all 30 to 60 minutes all at once.  For example, you can exercise 3 times a day for 10 or 20 minutes. Moderate exercise is safe for most people, but it is always a good idea to talk to your doctor before starting an exercise program.  · Keep moving. Russell Memos the lawn, work in the garden, or [employer]. Take the stairs instead of the elevator at work. · If you smoke, quit. People who smoke have an increased risk for heart attack, stroke, cancer, and other lung illnesses. Quitting is hard, but there are ways to boost your chance of quitting tobacco for good. ? Use nicotine gum, patches, or lozenges. ? Ask your doctor about stop-smoking programs and medicines. ? Keep trying. In addition to reducing your risk of diseases in the future, you will notice some benefits soon after you stop using tobacco. If you have shortness of breath or asthma symptoms, they will likely get better within a few weeks after you quit. · Limit how much alcohol you drink. Moderate amounts of alcohol (up to 2 drinks a day for men, 1 drink a day for women) are okay. But drinking too much can lead to liver problems, high blood pressure, and other health problems. Family health  If you have a family, there are many things you can do together to improve your health. · Eat meals together as a family as often as possible. · Eat healthy foods. This includes fruits, vegetables, lean meats and dairy, and whole grains. · Include your family in your fitness plan. Most people think of activities such as jogging or tennis as the way to fitness, but there are many ways you and your family can be more active. Anything that makes you breathe hard and gets your heart pumping is exercise. Here are some tips:  ? Walk to do errands or to take your child to school or the bus.  ? Go for a family bike ride after dinner instead of watching TV. Where can you learn more? Go to http://david-chantelle.info/. Enter W444 in the search box to learn more about \"A Healthy Lifestyle: Care Instructions. \"  Current as of: December 7, 2017  Content Version: 11.8  © 5856-3838 Healthwise, Incorporated. Care instructions adapted under license by [employer] (which disclaims liability or warranty for this information). If you have questions about a medical condition or this instruction, always ask your healthcare professional. [address] any warranty or liability for your use of this information.

## 2018-12-03 NOTE — LETTER
12/3/2018 Patient:  June July YOB: 1940 Date of Visit: 12/3/2018 Dear Mary Quispe DO 
[address] [place], [place] [address] [id number] VIA Facsimile: [phone] 
 : 
 
 
I was requested by Jairo Cruz DO to evaluate Ms. June July  for Chief Complaint Patient presents with  Memory Loss Regan Pavon I am recommending the following:  
 
Diagnoses and all orders for this visit: 1. Alzheimer's disease of other onset without behavioral disturbance 2. Anxiety and depression Other orders 
-     sertraline (ZOLOFT) 100 mg tablet; Take 1 Tab by mouth nightly. -     memantine-donepezil (NAMZARIC) 28-10 mg CSpX; Take 1 Cap by mouth nightly. -     memantine-donepezil (NAMZARIC) 28-10 mg CSpX; Take 1 Cap by mouth nightly. 
 
 
 
---------------------------------------------------------------------------------------------------------------------- Below is my encounter: Chief Complaint Patient presents with  Memory Loss HPI Troy Garner is a 69-year-old woman with likely Alzheimer's dementia and major depression here to follow-up. She lives at an assisted living facility independently mostly. Her level of function is that her ADLs are intact however she needs someone assisting in the morning preparing and administering her medication. Symptoms have progressed such that she had some difficulty with her nighttime medicines now and family is currently looking for assistance with nighttime medication. Patient tells me her sleep is good. She denies excessive depression but daughter reports a lot of down days. Patient tries to be active with friends and walks her dog daily. No vivid dreams. No falls. She uses a rolling walker. Review of Systems Neurological: Negative for focal weakness. Psychiatric/Behavioral: Positive for depression and memory loss. Negative for suicidal ideas. All other systems reviewed and are negative. Past Medical History:  
Diagnosis Date  Essential hypertension  Family history of skin cancer   
 brother  Radiation exposure   
 breast cancer left  Skin cancer Family History Problem Relation Age of Onset  No Known Problems Mother  No Known Problems Father Social History Socioeconomic History  Marital status:  Spouse name: Not on file  Number of children: Not on file  Years of education: Not on file  Highest education level: Not on file Social Needs  Financial resource strain: Not on file  Food insecurity - worry: Not on file  Food insecurity - inability: Not on file  Transportation needs - medical: Not on file  Transportation needs - non-medical: Not on file Occupational History  Not on file Tobacco Use  Smoking status: Never Smoker  Smokeless tobacco: Never Used Substance and Sexual Activity  Alcohol use: Yes Alcohol/week: 1.2 oz Types: 1 Cans of beer, 1 Glasses of wine per week  Drug use: Not on file  Sexual activity: Not on file Other Topics Concern  Not on file Social History Narrative  Not on file Allergies Allergen Reactions  Codeine Nausea Only  Penicillins Hives Current Outpatient Medications Medication Sig  sertraline (ZOLOFT) 100 mg tablet Take 1 Tab by mouth nightly.  memantine-donepezil (NAMZARIC) 28-10 mg CSpX Take 1 Cap by mouth nightly.  memantine-donepezil (NAMZARIC) 28-10 mg CSpX Take 1 Cap by mouth nightly.  metFORMIN ER (GLUCOPHAGE XR) 500 mg tablet  losartan-hydrochlorothiazide (HYZAAR) 100-12.5 mg per tablet  cephALEXin (KEFLEX) 500 mg capsule Take 500 mg by mouth four (4) times daily.  HYDROmorphone (DILAUDID) 2 mg tablet  LORazepam (ATIVAN) 1 mg tablet  bupivacaine-EPINEPHrine (MARCAINE-EPINEPHRINE) 0.25 %-1:200,000 soln Used for mohs surgery  Indications: LOCAL ANESTHESIA FOR PROCEDURES  
 
 No current facility-administered medications for this visit. Neurologic Exam  
 
Mental Status WD/WN adult in NAD, normal grooming VSS 
A&O, smiling but does admit to some down days, she shows some irritation when talking to her daughter PERRL, nonicteric Face is symmetric, tongue midline Speech is minimal but clear No limb ataxia. No abnl movements. Moving all extemities spontaneously and symmetric Slightly wide cautious gait with a rolling walker CVS RRR Lungs nonlabored Skin is warm and dry Visit Vitals /60 Pulse 62 Resp 16 Ht 5' 9\" (1.753 m) Wt 78.2 kg (172 lb 6.4 oz) SpO2 96% BMI 25.46 kg/m² Assessment and Plan Diagnoses and all orders for this visit: 1. Alzheimer's disease of other onset without behavioral disturbance 2. Anxiety and depression Other orders 
-     sertraline (ZOLOFT) 100 mg tablet; Take 1 Tab by mouth nightly. -     memantine-donepezil (NAMZARIC) 28-10 mg CSpX; Take 1 Cap by mouth nightly. -     memantine-donepezil (NAMZARIC) 28-10 mg CSpX; Take 1 Cap by mouth nightly. 41-year-old woman with likely Alzheimer's having some slight progression now that she needs assistance with medications full-time not just in the morning. I am going to have her transition to combination Namenda and Aricept to reduce her pill burden. Continue Zoloft 100 mg. Continue daily physical mental exercises. Watch for worsening depression. I will see her in 6 months. And optimize therapy as much as possible Thank you for giving me the opportunity to assist in the care of Ms. Francia Mancini. If you have questions, please do not hesitate to contact me. Sincerely, Kenzie Hollins DO Neurologist 
Diplomate SHERIF

## 2018-12-03 NOTE — PROGRESS NOTES
Ms. Nini Sarabia is here to follow up memory loss. Her symptoms are stable, per her daughter. Depression screening done on this patient.

## 2019-06-07 ENCOUNTER — TELEPHONE (OUTPATIENT)
Dept: NEUROLOGY | Age: 79
End: 2019-06-07

## 2019-06-07 ENCOUNTER — OFFICE VISIT (OUTPATIENT)
Dept: NEUROLOGY | Age: 79
End: 2019-06-07

## 2019-06-07 VITALS
WEIGHT: 182.4 LBS | DIASTOLIC BLOOD PRESSURE: 68 MMHG | BODY MASS INDEX: 27.02 KG/M2 | SYSTOLIC BLOOD PRESSURE: 142 MMHG | OXYGEN SATURATION: 98 % | RESPIRATION RATE: 16 BRPM | HEART RATE: 64 BPM | HEIGHT: 69 IN

## 2019-06-07 DIAGNOSIS — G30.8 ALZHEIMER'S DISEASE OF OTHER ONSET WITHOUT BEHAVIORAL DISTURBANCE: Primary | ICD-10-CM

## 2019-06-07 DIAGNOSIS — G91.2 NPH (NORMAL PRESSURE HYDROCEPHALUS) (HCC): ICD-10-CM

## 2019-06-07 DIAGNOSIS — R55 NEAR SYNCOPE: ICD-10-CM

## 2019-06-07 DIAGNOSIS — F32.A ANXIETY AND DEPRESSION: ICD-10-CM

## 2019-06-07 DIAGNOSIS — F02.80 ALZHEIMER'S DISEASE OF OTHER ONSET WITHOUT BEHAVIORAL DISTURBANCE: Primary | ICD-10-CM

## 2019-06-07 DIAGNOSIS — F41.9 ANXIETY AND DEPRESSION: ICD-10-CM

## 2019-06-07 NOTE — LETTER
6/7/2019 Patient:  Pascale Da Silva YOB: 1940 Date of Visit: 6/7/2019 Dear Spring Palacio DO 
[address] [place] [address] [facility] [address] VIA Facsimile: [phone] 
 : 
 
 
I was requested by Calvin Millan DO to evaluate Ms. Pascale Da Silva  for Chief Complaint Patient presents with  Memory Loss Kitty Riser I am recommending the following:  
 
Diagnoses and all orders for this visit: 1. Alzheimer's disease of other onset without behavioral disturbance 2. Anxiety and depression 3. NPH (normal pressure hydrocephalus) 4. Near syncope 
-     REFERRAL TO CARDIOLOGY 
 
 
 
---------------------------------------------------------------------------------------------------------------------- Below is my encounter: Chief Complaint Patient presents with  Memory Loss HPI Mrs. Dotty Wood is a 39-year-old woman/vascular dementia here to follow-up. I have her on donepezil and Namenda daily. She is also on Zoloft. She is here with her daughter. She still lives in assisted living. She has assistance in the morning with her medications. Her daughter needs to call her at night several times to ensure she takes her nighttime medications. She is still looking for nighttime help. She had her wellness checkup recently by her primary care doctor. Apparently she is a new left bundle branch block on EKG. Daughter is reporting that her mother has had of several episodes of dizziness and near syncope. She has never seen a cardiologist.  Weight is stable. She walks the dog but tends to get very poorly motivated so her neighbors walking the dog more often. She can do her own ADLs such as bathing and toileting and eating but needs help with medication administration. No hallucinations. Review of Systems Unable to perform ROS: Dementia Psychiatric/Behavioral: Positive for memory loss. Past Medical History:  
Diagnosis Date  Essential hypertension  Family history of skin cancer   
 brother  Radiation exposure   
 breast cancer left  Skin cancer Family History Problem Relation Age of Onset  No Known Problems Mother  No Known Problems Father Social History Socioeconomic History  Marital status:  Spouse name: Not on file  Number of children: Not on file  Years of education: Not on file  Highest education level: Not on file Occupational History  Not on file Social Needs  Financial resource strain: Not on file  Food insecurity:  
  Worry: Not on file Inability: Not on file  Transportation needs:  
  Medical: Not on file Non-medical: Not on file Tobacco Use  Smoking status: Never Smoker  Smokeless tobacco: Never Used Substance and Sexual Activity  Alcohol use: Yes Alcohol/week: 1.2 oz Types: 1 Cans of beer, 1 Glasses of wine per week  Drug use: Not on file  Sexual activity: Not on file Lifestyle  Physical activity:  
  Days per week: Not on file Minutes per session: Not on file  Stress: Not on file Relationships  Social connections:  
  Talks on phone: Not on file Gets together: Not on file Attends Christianity service: Not on file Active member of club or organization: Not on file Attends meetings of clubs or organizations: Not on file Relationship status: Not on file  Intimate partner violence:  
  Fear of current or ex partner: Not on file Emotionally abused: Not on file Physically abused: Not on file Forced sexual activity: Not on file Other Topics Concern  Not on file Social History Narrative  Not on file Allergies Allergen Reactions  Codeine Nausea Only  Penicillins Hives Current Outpatient Medications Medication Sig  
 memantine (NAMENDA) 10 mg tablet Take 1 Tab by mouth two (2) times a day. (Patient taking differently: Take 28 mg by mouth two (2) times a day.)  donepezil (ARICEPT) 10 mg tablet Take 1 Tab by mouth nightly.  sertraline (ZOLOFT) 100 mg tablet Take 1 Tab by mouth nightly.  metFORMIN ER (GLUCOPHAGE XR) 500 mg tablet 500 mg two (2) times a day.  losartan-hydrochlorothiazide (HYZAAR) 100-12.5 mg per tablet  cephALEXin (KEFLEX) 500 mg capsule Take 500 mg by mouth four (4) times daily.  HYDROmorphone (DILAUDID) 2 mg tablet  LORazepam (ATIVAN) 1 mg tablet  bupivacaine-EPINEPHrine (MARCAINE-EPINEPHRINE) 0.25 %-1:200,000 soln Used for mohs surgery  Indications: LOCAL ANESTHESIA FOR PROCEDURES No current facility-administered medications for this visit. Neurologic Exam  
 
Mental Status WD/WN adult in NAD, normal grooming VSS 
A&O, disoriented to time. A little irritable when I do not ask her questions directly. She is very apathetic and has no opinion. PERRL, nonicteric Face is symmetric, tongue midline Speech is clear No limb ataxia. No abnl movements. Moving all extemities spontaneously and symmetric Wide and cautious gait with Rollator CVS RRR Lungs nonlabored Skin is warm and dry Visit Vitals /68 Pulse 64 Resp 16 Ht 5' 9\" (1.753 m) Wt 82.7 kg (182 lb 6.4 oz) SpO2 98% BMI 26.94 kg/m² Assessment and Plan Diagnoses and all orders for this visit: 1. Alzheimer's disease of other onset without behavioral disturbance 2. Anxiety and depression 3. NPH (normal pressure hydrocephalus) 4. Near syncope 
-     REFERRAL TO CARDIOLOGY 69-year-old woman with dementia who seems to be worsening from a mood perspective. She has a lot of apathy. Could be advancing disease. I am concerned about the new EKG findings by report and these questions of near syncope. We will send her to cardiology for an opinion and further work-up. We will maintain donepezil for now.   I did discuss with the daughter that there could be some issues with arrhythmia but she does not want to discontinue or reduce the medicine at this time which is reasonable. Continue daily physical mental exercise. Safety seems intact. I will see her at her next visit. Thank you for giving me the opportunity to assist in the care of Ms. Kesha Pina. If you have questions, please do not hesitate to contact me. Sincerely, [address] [facility], DO Neurologist 
[facility] Diplomate DEMIANN

## 2019-06-07 NOTE — PROGRESS NOTES
Chief Complaint   Patient presents with   Shaq MANCERA    Mrs. Андрей Hill is a 48-year-old woman/vascular dementia here to follow-up. I have her on donepezil and Namenda daily. She is also on Zoloft. She is here with her daughter. She still lives in assisted living. She has assistance in the morning with her medications. Her daughter needs to call her at night several times to ensure she takes her nighttime medications. She is still looking for nighttime help. She had her wellness checkup recently by her primary care doctor. Apparently she is a new left bundle branch block on EKG. Daughter is reporting that her mother has had of several episodes of dizziness and near syncope. She has never seen a cardiologist.  Weight is stable. She walks the dog but tends to get very poorly motivated so her neighbors walking the dog more often. She can do her own ADLs such as bathing and toileting and eating but needs help with medication administration. No hallucinations. Review of Systems   Unable to perform ROS: Dementia   Psychiatric/Behavioral: Positive for memory loss.        Past Medical History:   Diagnosis Date    Essential hypertension     Family history of skin cancer     brother    Radiation exposure     breast cancer left    Skin cancer      Family History   Problem Relation Age of Onset    No Known Problems Mother     No Known Problems Father      Social History     Socioeconomic History    Marital status:      Spouse name: Not on file    Number of children: Not on file    Years of education: Not on file    Highest education level: Not on file   Occupational History    Not on file   Social Needs    Financial resource strain: Not on file    Food insecurity:     Worry: Not on file     Inability: Not on file    Transportation needs:     Medical: Not on file     Non-medical: Not on file   Tobacco Use    Smoking status: Never Smoker    Smokeless tobacco: Never Used Substance and Sexual Activity    Alcohol use: Yes     Alcohol/week: 1.2 oz     Types: 1 Cans of beer, 1 Glasses of wine per week    Drug use: Not on file    Sexual activity: Not on file   Lifestyle    Physical activity:     Days per week: Not on file     Minutes per session: Not on file    Stress: Not on file   Relationships    Social connections:     Talks on phone: Not on file     Gets together: Not on file     Attends Buddhist service: Not on file     Active member of club or organization: Not on file     Attends meetings of clubs or organizations: Not on file     Relationship status: Not on file    Intimate partner violence:     Fear of current or ex partner: Not on file     Emotionally abused: Not on file     Physically abused: Not on file     Forced sexual activity: Not on file   Other Topics Concern    Not on file   Social History Narrative    Not on file     Allergies   Allergen Reactions    Codeine Nausea Only    Penicillins Hives         Current Outpatient Medications   Medication Sig    memantine (NAMENDA) 10 mg tablet Take 1 Tab by mouth two (2) times a day. (Patient taking differently: Take 28 mg by mouth two (2) times a day.)    donepezil (ARICEPT) 10 mg tablet Take 1 Tab by mouth nightly.  sertraline (ZOLOFT) 100 mg tablet Take 1 Tab by mouth nightly.  metFORMIN ER (GLUCOPHAGE XR) 500 mg tablet 500 mg two (2) times a day.  losartan-hydrochlorothiazide (HYZAAR) 100-12.5 mg per tablet     cephALEXin (KEFLEX) 500 mg capsule Take 500 mg by mouth four (4) times daily.  HYDROmorphone (DILAUDID) 2 mg tablet     LORazepam (ATIVAN) 1 mg tablet     bupivacaine-EPINEPHrine (MARCAINE-EPINEPHRINE) 0.25 %-1:200,000 soln Used for mohs surgery  Indications: LOCAL ANESTHESIA FOR PROCEDURES     No current facility-administered medications for this visit. Neurologic Exam     Mental Status   WD/WN adult in NAD, normal grooming  VSS  A&O, disoriented to time.   A little irritable when I do not ask her questions directly. She is very apathetic and has no opinion. PERRL, nonicteric  Face is symmetric, tongue midline  Speech is clear  No limb ataxia. No abnl movements. Moving all extemities spontaneously and symmetric  Wide and cautious gait with Rollator    CVS RRR  Lungs nonlabored  Skin is warm and dry         Visit Vitals  /68   Pulse 64   Resp 16   Ht 5' 9\" (1.753 m)   Wt 82.7 kg (182 lb 6.4 oz)   SpO2 98%   BMI 26.94 kg/m²       Assessment and Plan   Diagnoses and all orders for this visit:    1. Alzheimer's disease of other onset without behavioral disturbance    2. Anxiety and depression    3. NPH (normal pressure hydrocephalus)    4. Near syncope  -     REFERRAL TO CARDIOLOGY      59-year-old woman with dementia who seems to be worsening from a mood perspective. She has a lot of apathy. Could be advancing disease. I am concerned about the new EKG findings by report and these questions of near syncope. We will send her to cardiology for an opinion and further work-up. We will maintain donepezil for now. I did discuss with the daughter that there could be some issues with arrhythmia but she does not want to discontinue or reduce the medicine at this time which is reasonable. Continue daily physical mental exercise. Safety seems intact. I will see her at her next visit. I reviewed and decided to continue the current medications.       [facility], [address]  Diplomate ABPN

## 2019-06-07 NOTE — PATIENT INSTRUCTIONS
[facility]   Statement to Patients  April 1, 2014      In an effort to ensure the large volume of patient prescription refills is processed in the most efficient and expeditious manner, we are asking our patients to assist us by calling your Pharmacy for all prescription refills, this will include also your  Mail Order Pharmacy. The pharmacy will contact our office electronically to continue the refill process. Please do not wait until the last minute to call your pharmacy. We need at least 48 hours (2days) to fill prescriptions. We also encourage you to call your pharmacy before going to  your prescription to make sure it is ready. With regard to controlled substance prescription refill requests (narcotic refills) that need to be picked up at our office, we ask your cooperation by providing us with at least 72 hours (3days) notice that you will need a refill. We will not refill narcotic prescription refill requests after 4:00pm on any weekday, Monday through Thursday, or after 2:00pm on Fridays, or on the weekends. We encourage everyone to explore another way of getting your prescription refill request processed using Mazree, our patient web portal through our electronic medical record system. Mazree is an efficient and effective way to communicate your medication request directly to the office and  downloadable as an macho on your smart phone . Mazree also features a review functionality that allows you to view your medication list as well as leave messages for your physician. Are you ready to get connected? If so please review the attatched instructions or speak to any of our staff to get you set up right away! Thank you so much for your cooperation. Should you have any questions please contact our Practice Administrator.     The Physicians and Staff,  [facility]

## 2019-06-07 NOTE — PROGRESS NOTES
Ms. Dotty Wood presents today to follow up memory loss. Her daughter, Joshua Goldman, is present and she reported patient's memory continues to decline. Depression screening done on patient.

## 2019-06-10 ENCOUNTER — DOCUMENTATION ONLY (OUTPATIENT)
Dept: NEUROLOGY | Age: 79
End: 2019-06-10

## 2019-06-10 NOTE — TELEPHONE ENCOUNTER
Responded to daughter and she is not able to meet on the day we have set aside for the . Will give  information so she can contact her and make other arrangements.

## 2019-06-10 NOTE — TELEPHONE ENCOUNTER
Sent email to [employer]  to let her know that the Tuesdays she comes to the office is not a good day for the pt's daughter so message was left for her and she will contact the pt's daughter back and set something up.

## 2019-06-12 ENCOUNTER — TELEPHONE (OUTPATIENT)
Dept: NEUROLOGY | Age: 79
End: 2019-06-12

## 2019-06-12 NOTE — TELEPHONE ENCOUNTER
LM on VM for pts daughter. It looks like Felicita Wesley has given the  this pts contact info to try and set up a good time to meet.

## 2019-06-12 NOTE — TELEPHONE ENCOUNTER
* Message from [facility] below:      ----- Message from Ketan Zambrano sent at 6/11/2019 12:33 PM EDT -----  Regarding: Dr. Raheel Humphrey  Pt's daughter(Alix Hussein) is requesting a call back with information on how to contact the .   Best contact number [phone].(please leave the information on the voice mail)

## 2019-07-19 ENCOUNTER — OFFICE VISIT (OUTPATIENT)
Dept: CARDIOLOGY CLINIC | Age: 79
End: 2019-07-19

## 2019-07-19 VITALS
HEIGHT: 68 IN | HEART RATE: 81 BPM | OXYGEN SATURATION: 97 % | WEIGHT: 185 LBS | RESPIRATION RATE: 16 BRPM | DIASTOLIC BLOOD PRESSURE: 60 MMHG | SYSTOLIC BLOOD PRESSURE: 130 MMHG | BODY MASS INDEX: 28.04 KG/M2

## 2019-07-19 DIAGNOSIS — G30.9 ALZHEIMER'S DEMENTIA WITHOUT BEHAVIORAL DISTURBANCE, UNSPECIFIED TIMING OF DEMENTIA ONSET: ICD-10-CM

## 2019-07-19 DIAGNOSIS — F02.80 ALZHEIMER'S DEMENTIA WITHOUT BEHAVIORAL DISTURBANCE, UNSPECIFIED TIMING OF DEMENTIA ONSET: ICD-10-CM

## 2019-07-19 DIAGNOSIS — R01.1 SYSTOLIC MURMUR: ICD-10-CM

## 2019-07-19 DIAGNOSIS — I10 HYPERTENSION, ESSENTIAL: ICD-10-CM

## 2019-07-19 DIAGNOSIS — R55 NEAR SYNCOPE: Primary | ICD-10-CM

## 2019-07-19 DIAGNOSIS — I44.7 LBBB (LEFT BUNDLE BRANCH BLOCK): ICD-10-CM

## 2019-07-19 NOTE — PROGRESS NOTES
Urban Kuhn     1940       Irais Corona MD, [facility]  Date of Visit-7/19/2019   PCP is DO Ana [facility] [place]  [facility]  HPI:  Urban Kuhn is a 77 y.o. female   New pt referral. She has dementia and is in assisted living. She is having chest pain and dizziness. Pt seen by neurology with dizziness and near syncope. She also has a new LBBB. Pt reports she does not have chest pain, dizziness, or feeling like she could pass out. She remembers her date of birth and family details, but she is not a good historian regarding her memory and medical history. Her daughter said she has NPH. She has fallen quite a few times, but no full syncope. She ambulates with a walker. Denies chest pain, edema, syncope or shortness of breath at rest   Has no tachycardia , palpitations or sense of arrythmia     She is here with her daughter, who is primarily contributing to Hx    EKG: SR, LBBB    Assessment/Plan:     1. Near syncope  No overt passing out. I suspect this more relates to her underlying frailty, age, and dementia. If this happens again, I would love to get an BP and HR check. - AMB POC EKG ROUTINE W/ 12 LEADS, INTER & REP    2. Systolic murmur  Probably aortic. Will get an echo to make sure it us not sclerosis or stenosis. The more important is that since she has a new LBBB, we want to make sure it is not CHF. 3. Hypertension, essential  Stable. BP Readings from Last 6 Encounters:   08/02/19 130/60   07/19/19 130/60   06/07/19 142/68   12/03/18 112/60   09/17/18 118/68   06/01/18 138/78         4. LBBB (left bundle branch block)  New. The question is whether to stop her Namenda or Aricept. Her QRS is 160ms. I don't feel and we've reviewed up to date, that we don't need to stop the meds unless there is a conduction abnormality. Will check EKG in and see her in 3 months .  As long as we don't see the QRS widening or low HR, we will continue as longs as they seem to be working. Continue Nameda and Aricpet    5. Alzheimer's dementia without behavioral disturbance, unspecified timing of dementia onset here to discuss cardiac clearance for dementia  Future Appointments   Date Time Provider Sapphire Salcedo   10/18/2019 11:00 AM Omayra Hernandez  [address]   1/3/2020  1:20 PM Alexander Shepherd DO [address]   2020 11:40 AM Irais Christine  [address]      There are no Patient Instructions on file for this visit. Key CAD CHF Meds             losartan-hydrochlorothiazide (HYZAAR) 100-12.5 mg per tablet (Taking)             Impression:   1. Near syncope    2. Systolic murmur    3. Hypertension, essential    4. LBBB (left bundle branch block)    5. 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 Allergies   Allergen Reactions    Codeine Nausea Only    Penicillins Hives       see supplement sheet, initialed and to be scanned by staff  Past Medical History:   Diagnosis Date    Essential hypertension     Family history of skin cancer     brother    Radiation exposure     breast cancer left    Skin cancer       Social Hx= reports that she has never smoked. She has never used smokeless tobacco. She reports that she drinks about 2.0 standard drinks of alcohol per week.    Allergies   Allergen Reactions    Codeine Nausea Only    Penicillins Hives      Exam and Labs:    Visit Vitals  /60 (BP 1 Location: Left arm, BP Patient Position: Sitting)   Pulse 81   Resp 16   Ht 5' 8\" (1.727 m)   Wt 185 lb (83.9 kg)   SpO2 97%   BMI 28.13 kg/m²    @Constitutional:  NAD, comfortable  Head: NC,AT. Eyes: No scleral icterus. Neck:  Neck supple. No JVD present. Throat: moist mucous membranes. Chest: Effort normal & normal respiratory excursion . Neurological: alert, conversant and oriented . Skin: Skin is not cold. No obvious systemic rash noted. Not diaphoretic. No erythema. Psychiatric:  Grossly normal mood and affect. Behavior appears normal. Extremities:  no clubbing or cyanosis. Abdomen: non distended    Lungs:  breath sounds normal. No stridor. distress, wheezes or  Rales. Heart:  normal rate, regular rhythm, normal S1, S2,  rubs, clicks or gallops, systolic murmur 9-1/6 at apex, PMI non displaced. Edema:  Edema is absent. No results found for: CHOL, CHOLX, CHLST, CHOLV, HDL, LDL, LDLC, DLDLP, TGLX, TRIGL, TRIGP, CHHD, CHHDX  No results found for: NA, K, CL, CO2, AGAP, GLU, BUN, CREA, BUCR, GFRAA, GFRNA, CA, GFRAA   Wt Readings from Last 3 Encounters:   07/19/19 185 lb (83.9 kg)   06/07/19 182 lb 6.4 oz (82.7 kg)   12/03/18 172 lb 6.4 oz (78.2 kg)      BP Readings from Last 3 Encounters:   07/19/19 130/60   06/07/19 142/68   12/03/18 112/60      Current Outpatient Medications   Medication Sig    memantine (NAMENDA) 10 mg tablet Take 1 Tab by mouth two (2) times a day. (Patient taking differently: Take 28 mg by mouth two (2) times a day.)    donepezil (ARICEPT) 10 mg tablet Take 1 Tab by mouth nightly.  sertraline (ZOLOFT) 100 mg tablet Take 1 Tab by mouth nightly.  metFORMIN ER (GLUCOPHAGE XR) 500 mg tablet 500 mg two (2) times a day.  losartan-hydrochlorothiazide (HYZAAR) 100-12.5 mg per tablet     cephALEXin (KEFLEX) 500 mg capsule Take 500 mg by mouth four (4) times daily.     HYDROmorphone (DILAUDID) 2 mg tablet     LORazepam (ATIVAN) 1 mg tablet     bupivacaine-EPINEPHrine (MARCAINE-EPINEPHRINE) 0.25 %-1:200,000 soln Used for mohs surgery  Indications: LOCAL ANESTHESIA FOR PROCEDURES     No current facility-administered medications for this visit. Impression see above.      Written by Aly Portillo, as dictated by Nel Michael MD.

## 2019-07-19 NOTE — PROGRESS NOTES
Visit Vitals  /60 (BP 1 Location: Left arm, BP Patient Position: Sitting)   Pulse 81   Resp 16   Ht 5' 8\" (1.727 m)   Wt 185 lb (83.9 kg)   SpO2 97%   BMI 28.13 kg/m²

## 2019-07-19 NOTE — LETTER
7-19-19 Patient: Kavon Aquino YOB: 1940 Date of Visit: 7/19/2019 Hanna Joyner, DO 
[address] [id number] VIA Facsimile: [phone] Dear Dr Louie Jean Baptiste, Thank you for referring Ms. Kavon Aquino to [facility] for evaluation. My notes for this consultation are attached. She can continue the Namenda and Aricept and I will follow up with her ekg and echo. If you have questions, please do not hesitate to call me. I look forward to following your patient along with you.  
 
 
Sincerely, 
 
Shon Thomas MD

## 2019-08-03 PROBLEM — I10 HYPERTENSION, ESSENTIAL: Status: ACTIVE | Noted: 2019-08-03

## 2019-08-03 PROBLEM — I44.7 LBBB (LEFT BUNDLE BRANCH BLOCK): Status: ACTIVE | Noted: 2019-08-03

## 2019-08-03 PROBLEM — R01.1 SYSTOLIC MURMUR: Status: ACTIVE | Noted: 2019-08-03

## 2019-08-03 PROBLEM — G30.9 ALZHEIMER'S DEMENTIA WITHOUT BEHAVIORAL DISTURBANCE (HCC): Status: ACTIVE | Noted: 2019-08-03

## 2019-08-03 PROBLEM — R55 NEAR SYNCOPE: Status: ACTIVE | Noted: 2019-08-03

## 2019-08-03 PROBLEM — F02.80 ALZHEIMER'S DEMENTIA WITHOUT BEHAVIORAL DISTURBANCE (HCC): Status: ACTIVE | Noted: 2019-08-03

## 2019-10-11 ENCOUNTER — TELEPHONE (OUTPATIENT)
Dept: NEUROLOGY | Age: 79
End: 2019-10-11

## 2019-10-11 RX ORDER — SERTRALINE HYDROCHLORIDE 100 MG/1
100 TABLET, FILM COATED ORAL
Qty: 90 TAB | Refills: 1 | Status: SHIPPED | OUTPATIENT
Start: 2019-10-11 | End: 2020-07-17 | Stop reason: SDUPTHER

## 2019-10-11 NOTE — TELEPHONE ENCOUNTER
----- Message from Marianela Ross sent at 10/11/2019  3:13 PM EDT -----  Regarding: Dr. Gage Boston Message/Vendor Calls    Caller's first and last name: Jayjay Estrella/daughter      Reason for call: pt daughter call back and advised that the prescription is going to the correct pharmacy  [id number] [facility] [phone]      Callback required yes/no and why: yes      Best contact number(s): [phone]      Details to clarify the request:      Marianela Ross

## 2019-10-11 NOTE — TELEPHONE ENCOUNTER
Fax request from [address] for refill of  Zoloft 100mg  (last filled 6/25/19)    LOV  6/7/19    Pending  1/3/20

## 2019-10-18 ENCOUNTER — OFFICE VISIT (OUTPATIENT)
Dept: CARDIOLOGY CLINIC | Age: 79
End: 2019-10-18

## 2019-10-18 DIAGNOSIS — I44.7 LBBB (LEFT BUNDLE BRANCH BLOCK): ICD-10-CM

## 2019-10-18 DIAGNOSIS — I10 HYPERTENSION, ESSENTIAL: Primary | ICD-10-CM

## 2019-10-18 NOTE — PROGRESS NOTES
Tamy Ayala     1940       Irais Patel MD, [facility] Date of Visit-10/18/2019 PCP is Anthony Miranda DO [address] [place] [address] and [facility] HPI:  Tamy Ayala is a [de-identified] y.o. female *** 
 
*** Denies chest pain, edema, syncope or shortness of breath at rest, has no tachycardia, palpitations or sense of arrhythmia. EKG: *** Assessment/Plan: 1. Hypertension, essential 
*** 
- AMB POC EKG ROUTINE W/ 12 LEADS, INTER & REP 2. LBBB (left bundle branch block) *** 
- AMB POC EKG ROUTINE W/ 12 LEADS, INTER & REP Follow up in ***. Impression: 1. Hypertension, essential   
2. LBBB (left bundle branch block) Cardiac History: No specialty comments available. ROS-except as noted above. . A complete cardiac and respiratory are reviewed and negative except as above ; Resp-denies wheezing  or productive cough,. Const- No unusual weight loss or fever; Neuro-no recent seizure or CVA ; GI- No BRBPR, abdom pain, bloating ; - no  hematuria  
see supplement sheet, initialed and to be scanned by staff Past Medical History:  
Diagnosis Date  Alzheimer's dementia without behavioral disturbance 8/3/2019  Essential hypertension  Family history of skin cancer   
 brother  LBBB (left bundle branch block) 8/3/2019  Radiation exposure   
 breast cancer left  Skin cancer Social Hx= reports that she has never smoked. She has never used smokeless tobacco. She reports that she drinks about 2.0 standard drinks of alcohol per week. Exam and Labs: There were no vitals taken for this visit. Constitutional:  NAD, comfortable Head: NC,AT. Eyes: No scleral icterus. Neck:  Neck supple. No JVD present. Throat: moist mucous membranes. Chest: Effort normal & normal respiratory excursion . Neurological: alert, conversant and oriented . Skin: Skin is not cold. No obvious systemic rash noted. Not diaphoretic. No erythema. Psychiatric:  Grossly normal mood and affect. Behavior appears normal. Extremities:  no clubbing or cyanosis. Abdomen: non distended Lungs:breath sounds normal. No stridor. distress, wheezes or  Rales. Heart: *** normal rate, regular rhythm, normal S1, S2, no murmurs, rubs, clicks or gallops , PMI non displaced. Edema: Edema is ***. No results found for: CHOL, CHOLX, CHLST, CHOLV, HDL, HDLP, LDL, LDLC, DLDLP, TGLX, TRIGL, TRIGP, CHHD, CHHDX No results found for: NA, K, CL, CO2, AGAP, GLU, BUN, CREA, BUCR, GFRAA, GFRNA, CA, GFRAA Wt Readings from Last 3 Encounters:  
08/02/19 185 lb (83.9 kg) 07/19/19 185 lb (83.9 kg) 06/07/19 182 lb 6.4 oz (82.7 kg) BP Readings from Last 3 Encounters:  
08/02/19 130/60  
07/19/19 130/60  
06/07/19 142/68 Current Outpatient Medications Medication Sig  sertraline (ZOLOFT) 100 mg tablet Take 1 Tab by mouth nightly.  memantine (NAMENDA) 10 mg tablet Take 1 Tab by mouth two (2) times a day. (Patient taking differently: Take 28 mg by mouth two (2) times a day.)  donepezil (ARICEPT) 10 mg tablet Take 1 Tab by mouth nightly.  metFORMIN ER (GLUCOPHAGE XR) 500 mg tablet 500 mg two (2) times a day.  losartan-hydrochlorothiazide (HYZAAR) 100-12.5 mg per tablet No current facility-administered medications for this visit. Impression see above.   
  
Written by Sylvester Padilla, as dictated by Piedad Connor MD.

## 2019-10-18 NOTE — Clinical Note
10/18/19 Patient: Anselmo Gudino YOB: 1940 Date of Visit: 10/18/2019 Jeremie Oliveira DO 
[address] [place] [id number] VIA Facsimile: [phone] Dear Jeremie Oliveira DO, Thank you for referring Ms. Anselmo Gudino to [facility] for evaluation. My notes for this consultation are attached. If you have questions, please do not hesitate to call me. I look forward to following your patient along with you.  
 
 
Sincerely, 
 
Piedad Connor MD

## 2019-11-26 NOTE — PROGRESS NOTES
EKG only   Has no change in QRS duration  Future Appointments   Date Time Provider Sapphire Salcedo   1/3/2020  1:20 PM DO CLIFFORD Conner   1/17/2020 11:40 AM Irais Christine  [address]    [facility] as planned

## 2019-12-17 RX ORDER — MEMANTINE HYDROCHLORIDE 10 MG/1
10 TABLET ORAL 2 TIMES DAILY
Qty: 180 TAB | Refills: 3 | Status: SHIPPED | OUTPATIENT
Start: 2019-12-17 | End: 2020-08-07 | Stop reason: SDUPTHER

## 2019-12-17 NOTE — TELEPHONE ENCOUNTER
LOV  19    Pendin/3/20    I called pharmacy. They have been filling Namenda XR 28mg    Last filled 2019 and now out of refills.

## 2019-12-17 NOTE — TELEPHONE ENCOUNTER
----- Message from Josh Marcelino Alicia sent at 12/17/2019  9:11 AM EST -----  Regarding: dr thurston/ refill  Medication Refill    Caller (if not patient): CONRAD LIU        Relationship of caller (if not patient): (CHILD)      Best contact number(s): [phone]      Name of medication and dosage if known: namenda      Is patient out of this medication (yes/no): yes      [facility]     Pharmacy listed in chart? (yes/no): no   Pharmacy phone number: [phone]      Details to clarify the request:      Josh Benson Alicia

## 2019-12-18 ENCOUNTER — TELEPHONE (OUTPATIENT)
Dept: NEUROLOGY | Age: 79
End: 2019-12-18

## 2020-01-03 ENCOUNTER — OFFICE VISIT (OUTPATIENT)
Dept: NEUROLOGY | Age: 80
End: 2020-01-03

## 2020-01-03 VITALS
DIASTOLIC BLOOD PRESSURE: 60 MMHG | SYSTOLIC BLOOD PRESSURE: 112 MMHG | RESPIRATION RATE: 16 BRPM | HEART RATE: 68 BPM | OXYGEN SATURATION: 98 % | HEIGHT: 68 IN | WEIGHT: 181 LBS | BODY MASS INDEX: 27.43 KG/M2

## 2020-01-03 DIAGNOSIS — F02.80 ALZHEIMER'S DISEASE OF OTHER ONSET WITHOUT BEHAVIORAL DISTURBANCE: Primary | ICD-10-CM

## 2020-01-03 DIAGNOSIS — G30.8 ALZHEIMER'S DISEASE OF OTHER ONSET WITHOUT BEHAVIORAL DISTURBANCE: Primary | ICD-10-CM

## 2020-01-03 DIAGNOSIS — F41.9 ANXIETY AND DEPRESSION: ICD-10-CM

## 2020-01-03 DIAGNOSIS — F32.A ANXIETY AND DEPRESSION: ICD-10-CM

## 2020-01-03 DIAGNOSIS — G91.2 NPH (NORMAL PRESSURE HYDROCEPHALUS) (HCC): ICD-10-CM

## 2020-01-03 RX ORDER — DONEPEZIL HYDROCHLORIDE 10 MG/1
10 TABLET, FILM COATED ORAL
Qty: 90 TAB | Refills: 3 | Status: SHIPPED | OUTPATIENT
Start: 2020-01-03 | End: 2020-08-07 | Stop reason: SDUPTHER

## 2020-01-03 NOTE — LETTER
1/3/20 Patient: Catina Espinal YOB: 1940 Date of Visit: 1/3/2020 Jayson Lux DO 
[address] [place] [phone] VIA Facsimile: [phone] Dear Jayson Lux DO, Thank you for referring Ms. Catina Espinal to [address] [place] [address] for evaluation. My notes for this consultation are attached. If you have questions, please do not hesitate to call me. I look forward to following your patient along with you. Sincerely, Santi Maravilla DO 
1/3/2020 Patient:  Catina Espinal YOB: 1940 Date of Visit: 1/3/2020 Rebeca Lux DO 
[address] [address] [facility] [phone] VIA Facsimile: [phone] 
 : 
 
 
I was requested by Sara Major DO to evaluate Ms. Catina Espinal  for Chief Complaint Patient presents with  Memory Loss Han Dejesus I am recommending the following:  
 
Diagnoses and all orders for this visit: 1. Alzheimer's disease of other onset without behavioral disturbance ([facility] 
-     [address] [place] [address] 2. NPH (normal pressure hydrocephalus) ([facility] 3. Anxiety and depression Other orders 
-     donepezil (ARICEPT) 10 mg tablet; Take 1 Tab by mouth nightly. 
 
 
 
---------------------------------------------------------------------------------------------------------------------- Below is my encounter: Chief Complaint Patient presents with  Memory Loss HPI 
 
76-year-old woman with dementia (Alzheimer's/NPH) here to follow-up. She is here with her daughter. Since I saw her last she remains in independent living but having a lot of difficulty. No nighttime assistance. She has been falling. She is argumentative. She spends most of the day sedentary. Somebody has to walk her dog now. Daughter says she is having a lot more incontinence to include bowel issues.   Her daughter has to call her daily multiple times and do teleconferencing to make sure she takes her medications. Her daughter is preparing a space in her home to move her in with her. She saw cardiology and okay to continue Aricept. She tells me she is doing all her own bathing and hygiene independently however, daughter says that she does not bathe daily. She is incontinent. She needs full-time supervision. Review of Systems Unable to perform ROS: Dementia Past Medical History:  
Diagnosis Date  Alzheimer's dementia without behavioral disturbance ([facility] 8/3/2019  Essential hypertension  Family history of skin cancer   
 brother  LBBB (left bundle branch block) 8/3/2019  Radiation exposure   
 breast cancer left  Skin cancer Family History Problem Relation Age of Onset  No Known Problems Mother  No Known Problems Father Social History Socioeconomic History  Marital status:  Spouse name: Not on file  Number of children: Not on file  Years of education: Not on file  Highest education level: Not on file Occupational History  Not on file Social Needs  Financial resource strain: Not on file  Food insecurity:  
  Worry: Not on file Inability: Not on file  Transportation needs:  
  Medical: Not on file Non-medical: Not on file Tobacco Use  Smoking status: Never Smoker  Smokeless tobacco: Never Used Substance and Sexual Activity  Alcohol use: Yes Alcohol/week: 2.0 standard drinks Types: 1 Cans of beer, 1 Glasses of wine per week  Drug use: Not on file  Sexual activity: Not on file Lifestyle  Physical activity:  
  Days per week: Not on file Minutes per session: Not on file  Stress: Not on file Relationships  Social connections:  
  Talks on phone: Not on file Gets together: Not on file Attends Congregational service: Not on file Active member of club or organization: Not on file Attends meetings of clubs or organizations: Not on file Relationship status: Not on file  Intimate partner violence:  
  Fear of current or ex partner: Not on file Emotionally abused: Not on file Physically abused: Not on file Forced sexual activity: Not on file Other Topics Concern  Not on file Social History Narrative  Not on file Allergies Allergen Reactions  Codeine Nausea Only  Penicillins Hives Current Outpatient Medications Medication Sig  
 donepezil (ARICEPT) 10 mg tablet Take 1 Tab by mouth nightly.  memantine (NAMENDA) 10 mg tablet Take 1 Tab by mouth two (2) times a day.  sertraline (ZOLOFT) 100 mg tablet Take 1 Tab by mouth nightly.  metFORMIN ER (GLUCOPHAGE XR) 500 mg tablet 500 mg two (2) times a day.  losartan-hydrochlorothiazide (HYZAAR) 100-12.5 mg per tablet No current facility-administered medications for this visit. Neurologic Exam  
 
Mental Status WD/WN adult in NAD, normal grooming VSS 
A&O, she knows the day of the week but does not know the year or date. She is very irritable particularly towards her daughter. PERRL, nonicteric Face is symmetric, tongue midline Speech is short minimal sentences but clear No limb ataxia. No abnl movements. Moving all extemities spontaneously and symmetric Good tempo both toes seem to point upwards. Nonmagnetic gait, with Rollator [employer] RRR Lungs nonlabored Skin is warm and dry Visit Vitals /60 Pulse 68 Resp 16 Ht 5' 8\" (1.727 m) Wt 82.1 kg (181 lb) SpO2 98% BMI 27.52 kg/m² Assessment and Plan Diagnoses and all orders for this visit: 1. Alzheimer's disease of other onset without behavioral disturbance ([address] 2. NPH (normal pressure hydrocephalus) ([facility] 3. Anxiety and depression Other orders 
-     donepezil (ARICEPT) 10 mg tablet; Take 1 Tab by mouth nightly. 48-year-old woman with dementia slowly advancing. She is going to see neurosurgery in a couple weeks to assess shunt function. Gait today not magnetic. 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 Hygiene is failing. ADLs need help. I think she could benefit from a course of home health to try to improve her strength. This may help potentially reduce falls risk. She is in the process of moving in with her daughter which hopefully will be soon. No change to the medications. I will see her at her next visit. I reviewed and decided to continue the current medications. This clinical note was dictated with an electronic dictation software that can make unintentional errors. If there are any questions, please contact me directly for clarification. Thank you for giving me the opportunity to assist in the care of Ms. Snehal Escobar. If you have questions, please do not hesitate to contact me. Sincerely, [facility], DO Neurologist 
B[facility] Diplomate SHERIF

## 2020-01-03 NOTE — PROGRESS NOTES
Chief Complaint   Patient presents with    Memory Loss       HPI    21-year-old woman with dementia (Alzheimer's/NPH) here to follow-up. She is here with her daughter. Since I saw her last she remains in independent living but having a lot of difficulty. No nighttime assistance. She has been falling. She is argumentative. She spends most of the day sedentary. Somebody has to walk her dog now. Daughter says she is having a lot more incontinence to include bowel issues. Her daughter has to call her daily multiple times and do teleconferencing to make sure she takes her medications. Her daughter is preparing a space in her home to move her in with her. She saw cardiology and okay to continue Aricept. She tells me she is doing all her own bathing and hygiene independently however, daughter says that she does not bathe daily. She is incontinent. She needs full-time supervision.             Review of Systems   Unable to perform ROS: Dementia       Past Medical History:   Diagnosis Date    Alzheimer's dementia without behavioral disturbance ([facility] 8/3/2019    Essential hypertension     Family history of skin cancer     brother    LBBB (left bundle branch block) 8/3/2019    Radiation exposure     breast cancer left    Skin cancer      Family History   Problem Relation Age of Onset    No Known Problems Mother     No Known Problems Father      Social History     Socioeconomic History    Marital status:      Spouse name: Not on file    Number of children: Not on file    Years of education: Not on file    Highest education level: Not on file   Occupational History    Not on file   Social Needs    Financial resource strain: Not on file    Food insecurity:     Worry: Not on file     Inability: Not on file    Transportation needs:     Medical: Not on file     Non-medical: Not on file   Tobacco Use    Smoking status: Never Smoker    Smokeless tobacco: Never Used   Substance and Sexual Activity  Alcohol use: Yes     Alcohol/week: 2.0 standard drinks     Types: 1 Cans of beer, 1 Glasses of wine per week    Drug use: Not on file    Sexual activity: Not on file   Lifestyle    Physical activity:     Days per week: Not on file     Minutes per session: Not on file    Stress: Not on file   Relationships    Social connections:     Talks on phone: Not on file     Gets together: Not on file     Attends Alevism service: Not on file     Active member of club or organization: Not on file     Attends meetings of clubs or organizations: Not on file     Relationship status: Not on file    Intimate partner violence:     Fear of current or ex partner: Not on file     Emotionally abused: Not on file     Physically abused: Not on file     Forced sexual activity: Not on file   Other Topics Concern    Not on file   Social History Narrative    Not on file     Allergies   Allergen Reactions    Codeine Nausea Only    Penicillins Hives         Current Outpatient Medications   Medication Sig    donepezil (ARICEPT) 10 mg tablet Take 1 Tab by mouth nightly.  memantine (NAMENDA) 10 mg tablet Take 1 Tab by mouth two (2) times a day.  sertraline (ZOLOFT) 100 mg tablet Take 1 Tab by mouth nightly.  metFORMIN ER (GLUCOPHAGE XR) 500 mg tablet 500 mg two (2) times a day.  losartan-hydrochlorothiazide (HYZAAR) 100-12.5 mg per tablet      No current facility-administered medications for this visit. Neurologic Exam     Mental Status   WD/WN adult in NAD, normal grooming  VSS  A&O, she knows the day of the week but does not know the year or date. She is very irritable particularly towards her daughter. PERRL, nonicteric  Face is symmetric, tongue midline  Speech is short minimal sentences but clear  No limb ataxia. No abnl movements. Moving all extemities spontaneously and symmetric  Good tempo both toes seem to point upwards.   Nonmagnetic gait, with Rollator    CVS RRR  Lungs nonlabored  Skin is warm and dry         Visit Vitals  /60   Pulse 68   Resp 16   Ht 5' 8\" (1.727 m)   Wt 82.1 kg (181 lb)   SpO2 98%   BMI 27.52 kg/m²       Assessment and Plan   Diagnoses and all orders for this visit:    1. Alzheimer's disease of other onset without behavioral disturbance ([facility] [address]    2. NPH (normal pressure hydrocephalus) ([facility] [address]    3. Anxiety and depression    Other orders  -     donepezil (ARICEPT) 10 mg tablet; Take 1 Tab by mouth nightly. 50-year-old woman with dementia slowly advancing. She is going to see neurosurgery in a couple weeks to assess shunt function. Gait today not magnetic. 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 Hygiene is failing. ADLs need help. I think she could benefit from a course of home health to try to improve her strength. This may help potentially reduce falls risk. She is in the process of moving in with her daughter which hopefully will be soon. No change to the medications. I will see her at her next visit. I reviewed and decided to continue the current medications. This clinical note was dictated with an electronic dictation software that can make unintentional errors. If there are any questions, please contact me directly for clarification.       Tabitha Brooks, [address]  Diplomate ABPN

## 2020-01-03 NOTE — PATIENT INSTRUCTIONS
[facility]   Statement to Patients  April 1, 2014      In an effort to ensure the large volume of patient prescription refills is processed in the most efficient and expeditious manner, we are asking our patients to assist us by calling your Pharmacy for all prescription refills, this will include also your  Mail Order Pharmacy. The pharmacy will contact our office electronically to continue the refill process. Please do not wait until the last minute to call your pharmacy. We need at least 48 hours (2days) to fill prescriptions. We also encourage you to call your pharmacy before going to  your prescription to make sure it is ready. With regard to controlled substance prescription refill requests (narcotic refills) that need to be picked up at our office, we ask your cooperation by providing us with at least 72 hours (3days) notice that you will need a refill. We will not refill narcotic prescription refill requests after 4:00pm on any weekday, Monday through Thursday, or after 2:00pm on Fridays, or on the weekends. We encourage everyone to explore another way of getting your prescription refill request processed using Ambature, our patient web portal through our electronic medical record system. Ambature is an efficient and effective way to communicate your medication request directly to the office and  downloadable as an macho on your smart phone . Ambature also features a review functionality that allows you to view your medication list as well as leave messages for your physician. Are you ready to get connected? If so please review the attatched instructions or speak to any of our staff to get you set up right away! Thank you so much for your cooperation. Should you have any questions please contact our Practice Administrator.     The Physicians and Staff,  [facility]

## 2020-01-13 ENCOUNTER — TELEPHONE (OUTPATIENT)
Dept: NEUROLOGY | Age: 80
End: 2020-01-13

## 2020-01-13 NOTE — TELEPHONE ENCOUNTER
[facility] stopped by the office, the pt was referred to them but they do not accept her insurance. [facility]

## 2020-01-17 ENCOUNTER — OFFICE VISIT (OUTPATIENT)
Dept: CARDIOLOGY CLINIC | Age: 80
End: 2020-01-17

## 2020-01-17 VITALS
DIASTOLIC BLOOD PRESSURE: 68 MMHG | HEART RATE: 84 BPM | WEIGHT: 181 LBS | BODY MASS INDEX: 27.43 KG/M2 | HEIGHT: 68 IN | SYSTOLIC BLOOD PRESSURE: 124 MMHG | RESPIRATION RATE: 16 BRPM | OXYGEN SATURATION: 97 %

## 2020-01-17 DIAGNOSIS — F02.80 ALZHEIMER'S DEMENTIA WITHOUT BEHAVIORAL DISTURBANCE, UNSPECIFIED TIMING OF DEMENTIA ONSET: ICD-10-CM

## 2020-01-17 DIAGNOSIS — R01.1 SYSTOLIC MURMUR: ICD-10-CM

## 2020-01-17 DIAGNOSIS — G30.9 ALZHEIMER'S DEMENTIA WITHOUT BEHAVIORAL DISTURBANCE, UNSPECIFIED TIMING OF DEMENTIA ONSET: ICD-10-CM

## 2020-01-17 DIAGNOSIS — I44.7 LBBB (LEFT BUNDLE BRANCH BLOCK): ICD-10-CM

## 2020-01-17 DIAGNOSIS — I10 HYPERTENSION, ESSENTIAL: ICD-10-CM

## 2020-01-17 DIAGNOSIS — R55 NEAR SYNCOPE: Primary | ICD-10-CM

## 2020-01-17 RX ORDER — LOSARTAN POTASSIUM 50 MG/1
50 TABLET ORAL DAILY
Qty: 90 TAB | Refills: 2 | Status: SHIPPED | OUTPATIENT
Start: 2020-01-17 | End: 2021-03-15

## 2020-01-17 NOTE — PROGRESS NOTES
Laura Mathias     1940       Irais Garcia MD, [facility]  Date of Visit-1/17/2020   PCP is DO Toñito Benitez [facility] [place]  [facility]  HPI:  Laura Mathias is a [de-identified] y.o. female   6 mo f/u. Hx of dementia. She has a new LBBB, NPH, and near syncope. We got an echo to look for her murmur. The QRS was 160 and the question was whether we should adjust meds for her dementia. A repeat EKG 10/18/19 showed no changed in QRS duration. Ambulance came to the residence 2 weeks ago due to a fall. Daughter would like patient to be much more careful about walking. Patient originally seen with question of whether Namenda and Aricept can be used with the bundle branch block and if there is any issue with prolonging the QRS or QTC. Patient has systolic murmur and seems to have aortic sclerosis. 08/02/19   ECHO ADULT COMPLETE 08/04/2019 8/4/2019    Narrative · Left Ventricle: Normal cavity size, wall thickness and systolic function   (ejection fraction normal). Estimated left ventricular ejection fraction   is 56 - 60%. Biplane method used to measure ejection fraction. No regional   wall motion abnormality noted. Mild (grade 1) left ventricular diastolic   dysfunction. · Aortic Valve: Mild aortic valve sclerosis with no significant stenosis. · Mitral Valve: Mitral valve thickening. Mild mitral valve stenosis. Mild   mitral valve regurgitation. Signed by: Amarilis Richardson MD      Pt is present with her daughter and ambulating with a rollator. Her daughter notes that she has had frequent falls since her last visit, however pt does not recall any of these falls. Pt denies dizziness upon standing, but her daughter states she is very wobbly when she stands. Her daughter states she will rock back on her heels and fall backwards.  Pt's daughter also notes that she has been off her Losartan for the past couple days because she has run out, but will resume soon.   Denies chest pain, edema, syncope or shortness of breath at rest, has no tachycardia, palpitations or sense of arrhythmia. Assessment/Plan:     Patient Instructions   Please stop your Hyzaar (losartan-hydrochlorothiazide). Please start Losartan 50mg daily. Please check your blood pressure periodically and let us know if there is any swelling. 1. Near syncope  Symptoms seem to be improved. The question is whether this is only NPH or does she also have some orthostasis. I will do orthostatics today and decide about meds. Note that off of combo pill for last two days BP has been 895.     2. Systolic murmur  Echo as above. Due to aortic sclerosis without stenosis     3. Hypertension, essential  Will decrease medications. BP Readings from Last 6 Encounters:   01/17/20 124/68   01/03/20 112/60   08/02/19 130/60   07/19/19 130/60   06/07/19 142/68   12/03/18 112/60      Key CAD CHF Meds             losartan (COZAAR) 50 mg tablet (Taking) Take 1 Tab by mouth daily. 4. LBBB (left bundle branch block)  She can continue Namenda and Aricept     5. Alzheimer's dementia without behavioral disturbance, unspecified timing of dementia [facility] ([facility]  F/u with neurology. Future Appointments   Date Time Provider Sapphire Salcedo   1/24/2020  3:00 PM [facility]   6/8/2020  2:40 PM [place], [address] [facility], DO [address]   7/10/2020 11:20 AM Irais Christine MD [facility] SCHED         Impression:   1. Near syncope    2. Systolic murmur    3. Hypertension, essential    4. LBBB (left bundle branch block)    5. Alzheimer's dementia without behavioral disturbance, unspecified timing of dementia onset [facility])       Cardiac History:   No specialty comments available. ROS-except as noted above. . A complete cardiac and respiratory are reviewed and negative except as above ; Resp-denies wheezing  or productive cough,.  Const- No unusual weight loss or fever; Neuro-no recent seizure or CVA ; GI- No BRBPR, abdom pain, bloating ; - no  hematuria   see supplement sheet, initialed and to be scanned by staff  Past Medical History:   Diagnosis Date    [facility]    Essential hypertension     Family history of skin cancer     brother    LBBB (left bundle branch block) 8/3/2019    Radiation exposure     breast cancer left    Skin cancer       Social Hx= reports that she has never smoked. She has never used smokeless tobacco. She reports current alcohol use of about 2.0 standard drinks of alcohol per week. Exam and Labs:  /70 (BP 1 Location: Left arm, BP Patient Position: Sitting)   Pulse 92   Resp 16   Ht 5' 8\" (1.727 m)   Wt 181 lb (82.1 kg)   SpO2 97%   BMI 27.52 kg/m² Constitutional:  NAD, comfortable  Head: NC,AT. Eyes: No scleral icterus. Neck:  Neck supple. No JVD present. Throat: moist mucous membranes. Chest: Effort normal & normal respiratory excursion . Neurological: alert, conversant and oriented . Skin: Skin is not cold. No obvious systemic rash noted. Not diaphoretic. No erythema. Psychiatric:  Grossly normal mood and affect. Behavior appears normal. Extremities:  no clubbing or cyanosis. Abdomen: non distended    Lungs:breath sounds normal. No stridor. distress, wheezes or  Rales. Heart:  normal rate, regular rhythm, normal S1, S2, no murmurs, rubs, clicks or gallops , PMI non displaced. Edema: Edema is none.   No results found for: CHOL, CHOLX, CHLST, CHOLV, HDL, HDLP, LDL, LDLC, DLDLP, TGLX, TRIGL, TRIGP, CHHD, CHHDX  No results found for: NA, K, CL, CO2, AGAP, GLU, BUN, CREA, BUCR, GFRAA, GFRNA, CA, GFRAA   Wt Readings from Last 3 Encounters:   01/17/20 181 lb (82.1 kg)   01/03/20 181 lb (82.1 kg)   08/02/19 185 lb (83.9 kg)      BP Readings from Last 3 Encounters:   01/17/20 110/70   01/03/20 112/60   08/02/19 130/60      Current Outpatient Medications   Medication Sig    donepezil (ARICEPT) 10 mg tablet Take 1 Tab by mouth nightly.  memantine (NAMENDA) 10 mg tablet Take 1 Tab by mouth two (2) times a day.  sertraline (ZOLOFT) 100 mg tablet Take 1 Tab by mouth nightly.  metFORMIN ER (GLUCOPHAGE XR) 500 mg tablet 500 mg two (2) times a day.  losartan-hydrochlorothiazide (HYZAAR) 100-12.5 mg per tablet      No current facility-administered medications for this visit. Impression see above.       Written by Sarah Zacarias, as dictated by Brandan Turner MD.

## 2020-01-17 NOTE — PATIENT INSTRUCTIONS
Please stop your Hyzaar (losartan-hydrochlorothiazide). Please start Losartan 50mg daily. Please check your blood pressure periodically and let us know if there is any swelling.

## 2020-01-17 NOTE — Clinical Note
1/17/20 Patient: Manjinder Iverson YOB: 1940 Date of Visit: 1/17/2020 Jayro Courtney DO 
[address] [id number] VIA Facsimile: [phone] Dear Jayro Courtney DO, Thank you for referring Ms. Manjinder Iverson to [facility] for evaluation. My notes for this consultation are attached. If you have questions, please do not hesitate to call me. I look forward to following your patient along with you.  
 
 
Sincerely, 
 
Kyle Torrez MD

## 2020-01-17 NOTE — PROGRESS NOTES
Visit Vitals  /70 (BP 1 Location: Left arm, BP Patient Position: Sitting)   Pulse 92   Resp 16   Ht 5' 8\" (1.727 m)   Wt 181 lb (82.1 kg)   SpO2 97%   BMI 27.52 kg/m²

## 2020-01-23 ENCOUNTER — TELEPHONE (OUTPATIENT)
Dept: NEUROLOGY | Age: 80
End: 2020-01-23

## 2020-01-23 NOTE — TELEPHONE ENCOUNTER
Ashely dey/ [facility] stopped by the office needing an order, notes, and demographics faxed to [facility]. Fax:[phone]    He would like a phone call back when completed.

## 2020-01-23 NOTE — TELEPHONE ENCOUNTER
Called Brianda Tubbs and let him know I have faxed this to them more than once. Same fax#. He will check with his office and let me know if he needs something else.

## 2020-01-24 ENCOUNTER — HOSPITAL ENCOUNTER (OUTPATIENT)
Dept: CT IMAGING | Age: 80
Discharge: HOME | End: 2020-01-24
Attending: NEUROLOGICAL SURGERY
Payer: MEDICARE

## 2020-01-24 DIAGNOSIS — G91.2 NORMAL PRESSURE HYDROCEPHALUS (HCC): ICD-10-CM

## 2020-01-24 PROCEDURE — 70450 CT HEAD/BRAIN W/O DYE: CPT

## 2020-02-11 ENCOUNTER — TELEPHONE (OUTPATIENT)
Dept: NEUROLOGY | Age: 80
End: 2020-02-11

## 2020-02-11 NOTE — TELEPHONE ENCOUNTER
I have printed notes and will fax upon my return to [facility] tomorrow.

## 2020-02-11 NOTE — TELEPHONE ENCOUNTER
Breanna Mane stopped by the office stating he hasn't received the office note for the pt.      Fax: [phone]

## 2020-02-11 NOTE — TELEPHONE ENCOUNTER
----- Message from Sheri Florian sent at 2/11/2020 12:32 PM EST -----  Regarding: Dr. Cecy Ballesteros  [facility] calling to get pt's office notes. Stated call last week and have not received anything.  Contact [phone] and fax# [phone]

## 2020-02-13 NOTE — TELEPHONE ENCOUNTER
I have faxed records again and received confirmation. I also placed records at  and called Richard Reis to inform him. He plans to pick them up today.

## 2020-02-24 ENCOUNTER — TELEPHONE (OUTPATIENT)
Dept: NEUROLOGY | Age: 80
End: 2020-02-24

## 2020-02-24 NOTE — TELEPHONE ENCOUNTER
Elsa Schrader stopped by the office stating they need \"[facility]\" taken off of the referral. Fax: [phone]

## 2020-02-25 NOTE — TELEPHONE ENCOUNTER
You need to be specific about what you want home health to do. I already changed it to [facility]. Kirill Ward

## 2020-02-25 NOTE — TELEPHONE ENCOUNTER
Andrew dey [facility] calling because the order does not say what skill/services are needed. She said she can accept a verbal order.  Please call

## 2020-02-25 NOTE — TELEPHONE ENCOUNTER
What would you like added to this referral to Home Health? Apparently it can not just say Home Health referral due to Parkinson's. Kitty Fernandez

## 2020-02-27 NOTE — TELEPHONE ENCOUNTER
Returned call to Akiko and mega on her vm that the doctor is asking for home health   PT/OT/Speech evaluations.

## 2020-02-28 ENCOUNTER — TELEPHONE (OUTPATIENT)
Dept: NEUROLOGY | Age: 80
End: 2020-02-28

## 2020-02-28 NOTE — TELEPHONE ENCOUNTER
Dr Michelle Saunders ordered home health for pt, need verbal order to continue physical therapy.  Please call

## 2020-02-28 NOTE — TELEPHONE ENCOUNTER
I spoke with Nydia Curtis. He will fax his findings early next week from his eval with this pt.  not in today to authorize verbal ok.

## 2020-03-02 ENCOUNTER — TELEPHONE (OUTPATIENT)
Dept: NEUROLOGY | Age: 80
End: 2020-03-02

## 2020-03-02 NOTE — TELEPHONE ENCOUNTER
Nadia, speech therapist w/ [facility], would like a call back to approve pt's plan of care-following her twice a week for two weeks then once week for two weeks.  Please call back

## 2020-03-23 ENCOUNTER — TELEPHONE (OUTPATIENT)
Dept: NEUROLOGY | Age: 80
End: 2020-03-23

## 2020-03-23 NOTE — TELEPHONE ENCOUNTER
----- Message from Marcia Upton sent at 3/23/2020  3:32 PM EDT -----  Regarding: Dr Jean Granger pt's speech therapists wanted to advise her therapy is going to be put on hold since her facility is locked down to the COVID-19 and they will be doing high risked phone calls.  Contact is [phone]

## 2020-03-24 ENCOUNTER — TELEPHONE (OUTPATIENT)
Dept: NEUROLOGY | Age: 80
End: 2020-03-24

## 2020-03-24 NOTE — TELEPHONE ENCOUNTER
Physical therapist calling to to let Dr Edyta Covarrubias know that the patient had a fall on Saturday. Rescue squad called to help patient up (because of facility rules).  Patient is okay and was not taken to hospital.

## 2020-04-01 ENCOUNTER — TELEPHONE (OUTPATIENT)
Dept: NEUROLOGY | Age: 80
End: 2020-04-01

## 2020-04-01 NOTE — TELEPHONE ENCOUNTER
Pt is now living with daughter so therapy can resume. Need verbal order for physical therapy, ot, and speech. Please call.

## 2020-04-13 ENCOUNTER — TELEPHONE (OUTPATIENT)
Dept: NEUROLOGY | Age: 80
End: 2020-04-13

## 2020-04-13 NOTE — TELEPHONE ENCOUNTER
Cha Gupta w/ [facility] calling, requesting an additional OT visit for this week for toilet transfers.  Please call back

## 2020-07-10 ENCOUNTER — OFFICE VISIT (OUTPATIENT)
Dept: CARDIOLOGY CLINIC | Age: 80
End: 2020-07-10

## 2020-07-10 VITALS
HEIGHT: 68 IN | RESPIRATION RATE: 18 BRPM | OXYGEN SATURATION: 98 % | WEIGHT: 173.6 LBS | BODY MASS INDEX: 26.31 KG/M2 | HEART RATE: 90 BPM | SYSTOLIC BLOOD PRESSURE: 130 MMHG | DIASTOLIC BLOOD PRESSURE: 80 MMHG

## 2020-07-10 DIAGNOSIS — F02.80 ALZHEIMER'S DEMENTIA WITHOUT BEHAVIORAL DISTURBANCE, UNSPECIFIED TIMING OF DEMENTIA ONSET: ICD-10-CM

## 2020-07-10 DIAGNOSIS — R01.1 SYSTOLIC MURMUR: ICD-10-CM

## 2020-07-10 DIAGNOSIS — G30.9 ALZHEIMER'S DEMENTIA WITHOUT BEHAVIORAL DISTURBANCE, UNSPECIFIED TIMING OF DEMENTIA ONSET: ICD-10-CM

## 2020-07-10 DIAGNOSIS — I44.7 LBBB (LEFT BUNDLE BRANCH BLOCK): ICD-10-CM

## 2020-07-10 DIAGNOSIS — R55 NEAR SYNCOPE: Primary | ICD-10-CM

## 2020-07-10 DIAGNOSIS — I10 HYPERTENSION, ESSENTIAL: ICD-10-CM

## 2020-07-10 NOTE — Clinical Note
7/10/20 Patient: Ronny Cao YOB: 1940 Date of Visit: 7/10/2020 Marlen Rivers DO 
2 [facility] [address] [place], [place] [id number] VIA Facsimile: [phone] Dear Marlen Rivers DO, Thank you for referring Ms. Ronny Cao to [facility] for evaluation. My notes for this consultation are attached. If you have questions, please do not hesitate to call me. I look forward to following your patient along with you.  
 
 
Sincerely, 
 
Marcela Mariano MD

## 2020-07-10 NOTE — PROGRESS NOTES
Nava Caceres     1940       Irais Keenan MD, [facility]  Date of Visit-7/10/2020   PCP is DO [facility] [place]  [facility]  HPI:  Nava Caceres is a [de-identified] y.o. female   F/u of near syncope and aortic sclerosis. Last visit stopped Hyzaar with change to Losartan. Pt ambulates with a walker. Pt is present with her daughter. Overall the pt states she is doing well. Pt's daughter notes that her mother fell about 3 weeks ago. Denies chest pain, edema, syncope or shortness of breath at rest, has no tachycardia, palpitations or sense of arrhythmia. Assessment/Plan:     1. Near syncope  No longer having episodes that seem to be passing out, they are more falls and mechanical from not using her walker. It seems better with the change in meds and I would avoid diuretics in the future. 2. Systolic murmur  Hard to appreciate any murmur today. EF is normal but she does have sclerosis of the aortic valve which would explain a systolic murmur. 3. Hypertension, essential  Stable with adjustments in meds. At goal , meds and possible side effects reviewed and patient denies  Key CAD CHF Meds             losartan (COZAAR) 50 mg tablet (Taking) Take 1 Tab by mouth daily. BP Readings from Last 6 Encounters:   07/10/20 130/80   01/17/20 124/68   01/03/20 112/60   08/02/19 130/60   07/19/19 130/60   06/07/19 142/68        4. LBBB (left bundle branch block)  Her daughter asked if the LBBB had to be followed. I don't see any worry unless she has syncopal episodes. 5. Alzheimer's dementia without behavioral disturbance, unspecified timing of dementia onset ([facility] [address]  Seems to be progressing. My main concern is her forgetting to use the walker. F/u PRN  Future Appointments   Date Time Provider Sapphire Salcedo   7/31/2020 11:30 AM Fredrick Clock, NP [facility]          Impression:   1. Near syncope    2.  Systolic murmur    3. Hypertension, essential    4. LBBB (left bundle branch block)    5. Alzheimer's dementia without behavioral disturbance, unspecified timing of dementia onset [facility])       Cardiac History:   No specialty comments available. ROS-except as noted above. . A complete cardiac and respiratory are reviewed and negative except as above ; Resp-denies wheezing  or productive cough,. Const- No unusual weight loss or fever; Neuro-no recent seizure or CVA ; GI- No BRBPR, abdom pain, bloating ; - no  hematuria   see supplement sheet, initialed and to be scanned by staff  Past Medical History:   Diagnosis Date    [facility]'s dementia without behavioral disturbance ([facility] [address] 8/3/2019    Essential hypertension     Family history of skin cancer     brother    LBBB (left bundle branch block) 8/3/2019    Radiation exposure     breast cancer left    Skin cancer       Social Hx= reports that she has never smoked. She has never used smokeless tobacco. She reports current alcohol use of about 2.0 standard drinks of alcohol per week. Exam and Labs:  /80 (BP 1 Location: Left arm, BP Patient Position: Sitting)   Pulse 90   Resp 18   Ht 5' 8\" (1.727 m)   Wt 173 lb 9.6 oz (78.7 kg)   SpO2 98%   BMI 26.40 kg/m² Constitutional:  NAD, comfortable  Head: NC,AT. Eyes: No scleral icterus. Neck:  Neck supple. No JVD present. Throat: moist mucous membranes. Chest: Effort normal & normal respiratory excursion . Neurological: alert, conversant and oriented . Skin: Skin is not cold. No obvious systemic rash noted. Not diaphoretic. No erythema. Psychiatric:  Grossly normal mood and affect. Behavior appears normal. Extremities:  no clubbing or cyanosis. Abdomen: non distended    Lungs:breath sounds normal. No stridor. distress, wheezes or  Rales. Heart: normal rate, regular rhythm, normal S1, S2, no murmurs, rubs, clicks or gallops , PMI non displaced. Edema: Edema is none.   No results found for: CHOL, CHOLX, CHLST, CHOLV, HDL, HDLP, LDL, LDLC, DLDLP, TGLX, TRIGL, TRIGP, CHHD, CHHDX  No results found for: NA, K, CL, CO2, AGAP, GLU, BUN, CREA, BUCR, GFRAA, GFRNA, CA, GFRAA   Wt Readings from Last 3 Encounters:   07/10/20 173 lb 9.6 oz (78.7 kg)   01/17/20 181 lb (82.1 kg)   01/03/20 181 lb (82.1 kg)      BP Readings from Last 3 Encounters:   07/10/20 130/80   01/17/20 124/68   01/03/20 112/60      Current Outpatient Medications   Medication Sig    losartan (COZAAR) 50 mg tablet Take 1 Tab by mouth daily.  donepezil (ARICEPT) 10 mg tablet Take 1 Tab by mouth nightly.  memantine (NAMENDA) 10 mg tablet Take 1 Tab by mouth two (2) times a day.  sertraline (ZOLOFT) 100 mg tablet Take 1 Tab by mouth nightly.  metFORMIN ER (GLUCOPHAGE XR) 500 mg tablet 500 mg two (2) times a day. No current facility-administered medications for this visit. Impression see above.       Written by Luzmaria Estrada, as dictated by Ty Rangel MD.

## 2020-07-17 NOTE — TELEPHONE ENCOUNTER
Pt's daughter said [facility] pharmacy called because they haven't gotten a response for a refill request on sertraline

## 2020-07-19 RX ORDER — SERTRALINE HYDROCHLORIDE 100 MG/1
100 TABLET, FILM COATED ORAL
Qty: 90 TAB | Refills: 0 | Status: SHIPPED | OUTPATIENT
Start: 2020-07-19 | End: 2020-08-07 | Stop reason: SDUPTHER

## 2020-08-07 ENCOUNTER — OFFICE VISIT (OUTPATIENT)
Dept: NEUROLOGY | Facility: CLINIC | Age: 80
End: 2020-08-07
Payer: MEDICARE

## 2020-08-07 VITALS
SYSTOLIC BLOOD PRESSURE: 122 MMHG | HEIGHT: 69 IN | OXYGEN SATURATION: 97 % | DIASTOLIC BLOOD PRESSURE: 70 MMHG | WEIGHT: 174.6 LBS | HEART RATE: 101 BPM | RESPIRATION RATE: 16 BRPM | BODY MASS INDEX: 25.86 KG/M2

## 2020-08-07 DIAGNOSIS — F02.80 ALZHEIMER'S DISEASE OF OTHER ONSET WITHOUT BEHAVIORAL DISTURBANCE: Primary | ICD-10-CM

## 2020-08-07 DIAGNOSIS — G91.2 NPH (NORMAL PRESSURE HYDROCEPHALUS) (HCC): ICD-10-CM

## 2020-08-07 DIAGNOSIS — G30.8 ALZHEIMER'S DISEASE OF OTHER ONSET WITHOUT BEHAVIORAL DISTURBANCE: Primary | ICD-10-CM

## 2020-08-07 PROCEDURE — G8432 DEP SCR NOT DOC, RNG: HCPCS | Performed by: PSYCHIATRY & NEUROLOGY

## 2020-08-07 PROCEDURE — G8419 CALC BMI OUT NRM PARAM NOF/U: HCPCS | Performed by: PSYCHIATRY & NEUROLOGY

## 2020-08-07 PROCEDURE — G8400 PT W/DXA NO RESULTS DOC: HCPCS | Performed by: PSYCHIATRY & NEUROLOGY

## 2020-08-07 PROCEDURE — G8427 DOCREV CUR MEDS BY ELIG CLIN: HCPCS | Performed by: PSYCHIATRY & NEUROLOGY

## 2020-08-07 PROCEDURE — 1090F PRES/ABSN URINE INCON ASSESS: CPT | Performed by: PSYCHIATRY & NEUROLOGY

## 2020-08-07 PROCEDURE — G8536 NO DOC ELDER MAL SCRN: HCPCS | Performed by: PSYCHIATRY & NEUROLOGY

## 2020-08-07 PROCEDURE — 1100F PTFALLS ASSESS-DOCD GE2>/YR: CPT | Performed by: PSYCHIATRY & NEUROLOGY

## 2020-08-07 PROCEDURE — 99214 OFFICE O/P EST MOD 30 MIN: CPT | Performed by: PSYCHIATRY & NEUROLOGY

## 2020-08-07 PROCEDURE — 3288F FALL RISK ASSESSMENT DOCD: CPT | Performed by: PSYCHIATRY & NEUROLOGY

## 2020-08-07 PROCEDURE — G8754 DIAS BP LESS 90: HCPCS | Performed by: PSYCHIATRY & NEUROLOGY

## 2020-08-07 PROCEDURE — G8752 SYS BP LESS 140: HCPCS | Performed by: PSYCHIATRY & NEUROLOGY

## 2020-08-07 RX ORDER — SERTRALINE HYDROCHLORIDE 100 MG/1
100 TABLET, FILM COATED ORAL
Qty: 90 TAB | Refills: 2 | Status: SHIPPED | OUTPATIENT
Start: 2020-08-07 | End: 2021-03-12 | Stop reason: SDUPTHER

## 2020-08-07 RX ORDER — DONEPEZIL HYDROCHLORIDE 10 MG/1
10 TABLET, FILM COATED ORAL
Qty: 90 TAB | Refills: 3 | Status: SHIPPED | OUTPATIENT
Start: 2020-08-07 | End: 2021-03-12 | Stop reason: ALTCHOICE

## 2020-08-07 RX ORDER — MEMANTINE HYDROCHLORIDE 10 MG/1
10 TABLET ORAL 2 TIMES DAILY
Qty: 180 TAB | Refills: 3 | Status: SHIPPED | OUTPATIENT
Start: 2020-08-07 | End: 2021-03-12 | Stop reason: ALTCHOICE

## 2020-08-07 NOTE — PROGRESS NOTES
patient daughter states patient memory is slowly getting worse. No other concerns. .  Chief Complaint   Patient presents with    Memory Loss     patient daughter states patient memory is slowly getting worse     .   Visit Vitals  /70 (BP 1 Location: Left arm, BP Patient Position: Sitting)   Pulse (!) 101   Resp 16   Ht 5' 9\" (1.753 m)   Wt 174 lb 9.6 oz (79.2 kg)   SpO2 97%   BMI 25.78 kg/m²

## 2020-08-07 NOTE — LETTER
8/7/20 Patient: Gean Kocher YOB: 1940 Date of Visit: 8/7/2020 Loren Mondragon DO 
[address] [address] [facility] [id number] VIA Facsimile: [phone] Dear Loren Mondragon DO, Thank you for referring Ms. Gean Kocher to [address] [place] [address] for evaluation. My notes for this consultation are attached. If you have questions, please do not hesitate to call me. I look forward to following your patient along with you. Sincerely, Loren Martinez DO 
 
8/7/2020 Patient:  Gean Kocher YOB: 1940 Date of Visit: 8/7/2020 Dear Loren Mondragon DO 
[address] [address] [facility] [address] VIA Facsimile: [phone]: I was requested by Matias Silver DO to evaluate Ms. Gean Kocher  for Chief Complaint Patient presents with  Memory Loss  
  patient daughter states patient memory is slowly getting worse Linda Brandt I am recommending the following:  
 
Diagnoses and all orders for this visit: 1. Alzheimer's disease of other onset without behavioral disturbance ([facility] [address] 2. NPH (normal pressure hydrocephalus) ([facility] [address] Other orders 
-     memantine (NAMENDA) 10 mg tablet; Take 1 Tab by mouth two (2) times a day. -     donepeziL (ARICEPT) 10 mg tablet; Take 1 Tab by mouth nightly. -     sertraline (ZOLOFT) 100 mg tablet; Take 1 Tab by mouth nightly. 
 
 
 
---------------------------------------------------------------------------------------------------------------------- Below is my encounter: Chief Complaint Patient presents with  Memory Loss  
  patient daughter states patient memory is slowly getting worse HPI 
 
 
 
 
44-year-old woman with dementia (Alzheimer's/NPH) here to follow-up. She is here with her daughter. Since her last visit she has now moved in with her daughter.   She also has a long-term care nurse that comes to the home during the weekdays. She did complete home therapy home health with some improvement however she is not compliant with continuing the exercises on her own. She has a lot of apathy and remains angry that she has lost her independence. She chooses to be very sedentary most of the time. She sleeps well and eats well. However she needs assistance with bathing toileting and dressing. Review of Systems Unable to perform ROS: Dementia Past Medical History:  
Diagnosis Date  Alzheimer's dementia without behavioral disturbance ([facility] [address] 8/3/2019  Essential hypertension  Family history of skin cancer   
 brother  LBBB (left bundle branch block) 8/3/2019  Radiation exposure   
 breast cancer left  Skin cancer Family History Problem Relation Age of Onset  No Known Problems Mother  No Known Problems Father Social History Socioeconomic History  Marital status:  Spouse name: Not on file  Number of children: Not on file  Years of education: Not on file  Highest education level: Not on file Occupational History  Not on file Social Needs  Financial resource strain: Not on file  Food insecurity Worry: Not on file Inability: Not on file  Transportation needs Medical: Not on file Non-medical: Not on file Tobacco Use  Smoking status: Never Smoker  Smokeless tobacco: Never Used Substance and Sexual Activity  Alcohol use: Yes Alcohol/week: 2.0 standard drinks Types: 1 Cans of beer, 1 Glasses of wine per week  Drug use: Not on file  Sexual activity: Not on file Lifestyle  Physical activity Days per week: Not on file Minutes per session: Not on file  Stress: Not on file Relationships  Social connections Talks on phone: Not on file Gets together: Not on file Attends [employer] service: Not on file Active member of club or organization: Not on file Attends meetings of clubs or organizations: Not on file Relationship status: Not on file  Intimate partner violence Fear of current or ex partner: Not on file Emotionally abused: Not on file Physically abused: Not on file Forced sexual activity: Not on file Other Topics Concern  Not on file Social History Narrative  Not on file Allergies Allergen Reactions  Codeine Nausea Only  Penicillins Hives Current Outpatient Medications Medication Sig  
 memantine (NAMENDA) 10 mg tablet Take 1 Tab by mouth two (2) times a day.  donepeziL (ARICEPT) 10 mg tablet Take 1 Tab by mouth nightly.  sertraline (ZOLOFT) 100 mg tablet Take 1 Tab by mouth nightly.  losartan (COZAAR) 50 mg tablet Take 1 Tab by mouth daily.  metFORMIN ER (GLUCOPHAGE XR) 500 mg tablet 500 mg two (2) times a day. No current facility-administered medications for this visit. Neurologic Exam  
 
Mental Status WD/WN adult in NAD, normal grooming VSS 
A&O, poor eye contact. Not very conversant by choice. She does not know the date. She looks to her daughter to answer all the questions. PERRL, nonicteric Face is symmetric, tongue midline Speech is limited by choice but clear No limb ataxia. No abnl movements. Moving all extemities spontaneously and symmetric Good tempo gait with Rollator [facility] RRR Lungs nonlabored Skin is warm and dry Visit Vitals /70 (BP 1 Location: Left arm, BP Patient Position: Sitting) Pulse (!) 101 Resp 16 Ht 5' 9\" (1.753 m) Wt 79.2 kg (174 lb 9.6 oz) SpO2 97% BMI 25.78 kg/m² Assessment and Plan Diagnoses and all orders for this visit: 1. Alzheimer's disease of other onset without behavioral disturbance ([address] 2. NPH (normal pressure hydrocephalus) ([address] Other orders 
-     memantine (NAMENDA) 10 mg tablet; Take 1 Tab by mouth two (2) times a day. -     donepeziL (ARICEPT) 10 mg tablet; Take 1 Tab by mouth nightly. -     sertraline (ZOLOFT) 100 mg tablet; Take 1 Tab by mouth nightly. 69-year-old woman with Alzheimer's dementia and NPH. A little bit of a slight progression since her last visit such that she is needing more assistance now with bathing and toileting. I encouraged her to try to be more active during the day. She needs to try to sit outside with her caretaker. She did declines every opportunity to do something physical.  She needs to try walking daily. Watch for worsening mood changes. No change to Namenda or Aricept. Greater than 50% of this 20 minute visit was spent counseling about her diagnosis, current medications, and plans moving forward. Thank you for giving me the opportunity to assist in the care of Ms. Nava Caceres. If you have questions, please do not hesitate to contact me. Sincerely, [facility], DO Neurologist 
B[facility] Medicine Diplomate SEHRIF

## 2020-08-07 NOTE — PROGRESS NOTES
Chief Complaint   Patient presents with    Memory Loss     patient daughter states patient memory is slowly getting worse       HPI          58-year-old woman with dementia (Alzheimer's/NPH) here to follow-up. She is here with her daughter. Since her last visit she has now moved in with her daughter. She also has a long-term care nurse that comes to the home during the weekdays. She did complete home therapy home health with some improvement however she is not compliant with continuing the exercises on her own. She has a lot of apathy and remains angry that she has lost her independence. She chooses to be very sedentary most of the time. She sleeps well and eats well. However she needs assistance with bathing toileting and dressing. Review of Systems   Unable to perform ROS: Dementia       Past Medical History:   Diagnosis Date    Alzheimer's dementia without behavioral disturbance ([facility] [address] 8/3/2019    Essential hypertension     Family history of skin cancer     brother    LBBB (left bundle branch block) 8/3/2019    Radiation exposure     breast cancer left    Skin cancer      Family History   Problem Relation Age of Onset    No Known Problems Mother     No Known Problems Father      Social History     Socioeconomic History    Marital status:      Spouse name: Not on file    Number of children: Not on file    Years of education: Not on file    Highest education level: Not on file   Occupational History    Not on file   Social Needs    Financial resource strain: Not on file    Food insecurity     Worry: Not on file     Inability: Not on file    Transportation needs     Medical: Not on file     Non-medical: Not on file   Tobacco Use    Smoking status: Never Smoker    Smokeless tobacco: Never Used   Substance and Sexual Activity    Alcohol use:  Yes     Alcohol/week: 2.0 standard drinks     Types: 1 Cans of beer, 1 Glasses of wine per week    Drug use: Not on file    Sexual activity: Not on file   Lifestyle    Physical activity     Days per week: Not on file     Minutes per session: Not on file    Stress: Not on file   Relationships    Social connections     Talks on phone: Not on file     Gets together: Not on file     Attends [employer] service: Not on file     Active member of club or organization: Not on file     Attends meetings of clubs or organizations: Not on file     Relationship status: Not on file    Intimate partner violence     Fear of current or ex partner: Not on file     Emotionally abused: Not on file     Physically abused: Not on file     Forced sexual activity: Not on file   Other Topics Concern    Not on file   Social History Narrative    Not on file     Allergies   Allergen Reactions    Codeine Nausea Only    Penicillins Hives         Current Outpatient Medications   Medication Sig    memantine (NAMENDA) 10 mg tablet Take 1 Tab by mouth two (2) times a day.  donepeziL (ARICEPT) 10 mg tablet Take 1 Tab by mouth nightly.  sertraline (ZOLOFT) 100 mg tablet Take 1 Tab by mouth nightly.  losartan (COZAAR) 50 mg tablet Take 1 Tab by mouth daily.  metFORMIN ER (GLUCOPHAGE XR) 500 mg tablet 500 mg two (2) times a day. No current facility-administered medications for this visit. Neurologic Exam     Mental Status   WD/WN adult in NAD, normal grooming  VSS  A&O, poor eye contact. Not very conversant by choice. She does not know the date. She looks to her daughter to answer all the questions. PERRL, nonicteric  Face is symmetric, tongue midline  Speech is limited by choice but clear  No limb ataxia. No abnl movements.   Moving all extemities spontaneously and symmetric  Good tempo gait with Rollator    [facility] RRR  Lungs nonlabored  Skin is warm and dry         Visit Vitals  /70 (BP 1 Location: Left arm, BP Patient Position: Sitting)   Pulse (!) 101   Resp 16   Ht 5' 9\" (1.753 m)   Wt 79.2 kg (174 lb 9.6 oz)   SpO2 97%   BMI 25.78 kg/m²       Assessment and Plan   Diagnoses and all orders for this visit:    1. Alzheimer's disease of other onset without behavioral disturbance ([facility] [address]    2. NPH (normal pressure hydrocephalus) ([facility])    Other orders  -     memantine (NAMENDA) 10 mg tablet; Take 1 Tab by mouth two (2) times a day. -     donepeziL (ARICEPT) 10 mg tablet; Take 1 Tab by mouth nightly. -     sertraline (ZOLOFT) 100 mg tablet; Take 1 Tab by mouth nightly. 56-year-old woman with Alzheimer's dementia and NPH. A little bit of a slight progression since her last visit such that she is needing more assistance now with bathing and toileting. I encouraged her to try to be more active during the day. She needs to try to sit outside with her caretaker. She did declines every opportunity to do something physical.  She needs to try walking daily. Watch for worsening mood changes. No change to Namenda or Aricept. Greater than 50% of this 20 minute visit was spent counseling about her diagnosis, current medications, and plans moving forward. I reviewed and decided to continue the current medications. This clinical note was dictated with an electronic dictation software that can make unintentional errors. If there are any questions, please contact me directly for clarification.       [address] [facility], [facility] [address]  Diplomate SHERIF

## 2020-10-28 ENCOUNTER — TELEPHONE (OUTPATIENT)
Dept: CARDIOLOGY CLINIC | Age: 80
End: 2020-10-28

## 2020-10-28 NOTE — TELEPHONE ENCOUNTER
Alpa Mark with [facility] states the pt's daughter(Fe) stated Dr. Alina Lea dismissed the pt. Alpa Mark calling to get some clarity.  Please advise      BHUMIKA:[phone]

## 2020-10-29 NOTE — TELEPHONE ENCOUNTER
Attempted to return call, was on hold for over 10 minutes. Faxed last office note with FOLLOW UP PRN highlighted.

## 2021-01-14 ENCOUNTER — HOSPITAL ENCOUNTER (EMERGENCY)
Age: 81
Discharge: HOME | End: 2021-01-14
Attending: EMERGENCY MEDICINE
Payer: MEDICARE

## 2021-01-14 ENCOUNTER — APPOINTMENT (OUTPATIENT)
Dept: CT IMAGING | Age: 81
End: 2021-01-14
Attending: PHYSICIAN ASSISTANT
Payer: MEDICARE

## 2021-01-14 ENCOUNTER — APPOINTMENT (OUTPATIENT)
Dept: GENERAL RADIOLOGY | Age: 81
End: 2021-01-14
Attending: EMERGENCY MEDICINE
Payer: MEDICARE

## 2021-01-14 VITALS
TEMPERATURE: 97.9 F | WEIGHT: 174.6 LBS | OXYGEN SATURATION: 95 % | HEIGHT: 70 IN | DIASTOLIC BLOOD PRESSURE: 67 MMHG | SYSTOLIC BLOOD PRESSURE: 144 MMHG | HEART RATE: 101 BPM | BODY MASS INDEX: 25 KG/M2 | RESPIRATION RATE: 16 BRPM

## 2021-01-14 DIAGNOSIS — S20.212A CONTUSION OF LEFT CHEST WALL, INITIAL ENCOUNTER: ICD-10-CM

## 2021-01-14 DIAGNOSIS — S00.83XA FACIAL CONTUSION, INITIAL ENCOUNTER: Primary | ICD-10-CM

## 2021-01-14 DIAGNOSIS — S01.81XA FOREHEAD LACERATION, INITIAL ENCOUNTER: ICD-10-CM

## 2021-01-14 LAB
ALBUMIN SERPL-MCNC: 3.6 G/DL (ref 3.5–5)
ALBUMIN/GLOB SERPL: 0.8 {RATIO} (ref 1.1–2.2)
ALP SERPL-CCNC: 86 U/L (ref 45–117)
ALT SERPL-CCNC: 20 U/L (ref 12–78)
ANION GAP SERPL CALC-SCNC: 3 MMOL/L (ref 5–15)
AST SERPL-CCNC: 19 U/L (ref 15–37)
BASOPHILS # BLD: 0.1 K/UL (ref 0–0.1)
BASOPHILS NFR BLD: 0 % (ref 0–1)
BILIRUB SERPL-MCNC: 0.4 MG/DL (ref 0.2–1)
BUN SERPL-MCNC: 14 MG/DL (ref 6–20)
BUN/CREAT SERPL: 14 (ref 12–20)
CALCIUM SERPL-MCNC: 9.1 MG/DL (ref 8.5–10.1)
CHLORIDE SERPL-SCNC: 104 MMOL/L (ref 97–108)
CO2 SERPL-SCNC: 28 MMOL/L (ref 21–32)
CREAT SERPL-MCNC: 1 MG/DL (ref 0.55–1.02)
DIFFERENTIAL METHOD BLD: ABNORMAL
EOSINOPHIL # BLD: 0.1 K/UL (ref 0–0.4)
EOSINOPHIL NFR BLD: 1 % (ref 0–7)
ERYTHROCYTE [DISTWIDTH] IN BLOOD BY AUTOMATED COUNT: 15.3 % (ref 11.5–14.5)
GLOBULIN SER CALC-MCNC: 4.4 G/DL (ref 2–4)
GLUCOSE SERPL-MCNC: 138 MG/DL (ref 65–100)
HCT VFR BLD AUTO: 40.1 % (ref 35–47)
HGB BLD-MCNC: 12.6 G/DL (ref 11.5–16)
IMM GRANULOCYTES # BLD AUTO: 0.1 K/UL (ref 0–0.04)
IMM GRANULOCYTES NFR BLD AUTO: 0 % (ref 0–0.5)
LYMPHOCYTES # BLD: 1.7 K/UL (ref 0.8–3.5)
LYMPHOCYTES NFR BLD: 12 % (ref 12–49)
MCH RBC QN AUTO: 27.8 PG (ref 26–34)
MCHC RBC AUTO-ENTMCNC: 31.4 G/DL (ref 30–36.5)
MCV RBC AUTO: 88.3 FL (ref 80–99)
MONOCYTES # BLD: 0.8 K/UL (ref 0–1)
MONOCYTES NFR BLD: 6 % (ref 5–13)
NEUTS SEG # BLD: 11.7 K/UL (ref 1.8–8)
NEUTS SEG NFR BLD: 81 % (ref 32–75)
NRBC # BLD: 0 K/UL (ref 0–0.01)
NRBC BLD-RTO: 0 PER 100 WBC
PLATELET # BLD AUTO: 310 K/UL (ref 150–400)
PMV BLD AUTO: 10.3 FL (ref 8.9–12.9)
POTASSIUM SERPL-SCNC: 4.4 MMOL/L (ref 3.5–5.1)
PROT SERPL-MCNC: 8 G/DL (ref 6.4–8.2)
RBC # BLD AUTO: 4.54 M/UL (ref 3.8–5.2)
SODIUM SERPL-SCNC: 135 MMOL/L (ref 136–145)
WBC # BLD AUTO: 14.4 K/UL (ref 3.6–11)

## 2021-01-14 PROCEDURE — 93005 ELECTROCARDIOGRAM TRACING: CPT

## 2021-01-14 PROCEDURE — 36415 COLL VENOUS BLD VENIPUNCTURE: CPT

## 2021-01-14 PROCEDURE — 80053 COMPREHEN METABOLIC PANEL: CPT

## 2021-01-14 PROCEDURE — 75810000293 HC SIMP/SUPERF WND  RPR

## 2021-01-14 PROCEDURE — 99284 EMERGENCY DEPT VISIT MOD MDM: CPT

## 2021-01-14 PROCEDURE — 70486 CT MAXILLOFACIAL W/O DYE: CPT

## 2021-01-14 PROCEDURE — 71101 X-RAY EXAM UNILAT RIBS/CHEST: CPT

## 2021-01-14 PROCEDURE — 77030010507 HC ADH SKN DERMBND J&J -B

## 2021-01-14 PROCEDURE — 74011250637 HC RX REV CODE- 250/637: Performed by: EMERGENCY MEDICINE

## 2021-01-14 PROCEDURE — 85025 COMPLETE CBC W/AUTO DIFF WBC: CPT

## 2021-01-14 PROCEDURE — 70450 CT HEAD/BRAIN W/O DYE: CPT

## 2021-01-14 RX ORDER — IBUPROFEN 600 MG/1
600 TABLET ORAL
Qty: 20 TAB | Refills: 0 | Status: SHIPPED | OUTPATIENT
Start: 2021-01-14 | End: 2021-03-15

## 2021-01-14 RX ORDER — TRAMADOL HYDROCHLORIDE 50 MG/1
100 TABLET ORAL
Status: COMPLETED | OUTPATIENT
Start: 2021-01-14 | End: 2021-01-14

## 2021-01-14 RX ORDER — ACETAMINOPHEN 325 MG/1
TABLET ORAL
Status: DISCONTINUED
Start: 2021-01-14 | End: 2021-01-15 | Stop reason: HOSPADM

## 2021-01-14 RX ORDER — ACETAMINOPHEN 325 MG/1
650 TABLET ORAL ONCE
Status: COMPLETED | OUTPATIENT
Start: 2021-01-14 | End: 2021-01-14

## 2021-01-14 RX ADMIN — ACETAMINOPHEN 650 MG: 325 TABLET ORAL at 22:27

## 2021-01-14 RX ADMIN — TRAMADOL HYDROCHLORIDE 100 MG: 50 TABLET, FILM COATED ORAL at 22:27

## 2021-01-14 NOTE — ED TRIAGE NOTES
Patient presents to the ED ambulatory accompanied by her daughter after a GLF. Patient has a history of dementia. Patient's daughter reports the patient was with her home health nurse when he fell and struck her head. Patient's head is wrapped in a bandage at this time with bleeding controlled. Patient denies any pain. Patient's daughter denies any LOC, nausea or vomiting.

## 2021-01-15 LAB
ATRIAL RATE: 95 BPM
CALCULATED P AXIS, ECG09: 73 DEGREES
CALCULATED R AXIS, ECG10: -20 DEGREES
CALCULATED T AXIS, ECG11: 90 DEGREES
DIAGNOSIS, 93000: NORMAL
P-R INTERVAL, ECG05: 194 MS
Q-T INTERVAL, ECG07: 416 MS
QRS DURATION, ECG06: 150 MS
QTC CALCULATION (BEZET), ECG08: 522 MS
VENTRICULAR RATE, ECG03: 95 BPM

## 2021-01-15 NOTE — ED NOTES
Patient was provided with discharge instructions. Instructions and any medications were reviewed with the patient &/or family by Dr. Brady Tineo. Questions and concerns addressed by the provider. Patient discharged in stable condition via Salima, RN and was escorted via wheelchair by RN to the car.

## 2021-01-15 NOTE — ED PROVIDER NOTES
EMERGENCY DEPARTMENT HISTORY AND PHYSICAL EXAM      Date: 1/14/2021  Patient Name: Gem Bassett  Patient Age and Sex: 79 y.o. female    History of Presenting Illness     Chief Complaint   Patient presents with    Fall    Headache       History Provided By: Patient and Patient's Daughter    Ability to gather history was limited by:     HPI: Gem Bassett, 79 y.o. female with history of Alzheimer's dementia, ventriculostomy, presents after a fall which occurred immediately prior to ED arrival.  She reportedly had a trip and fall or slip and fall in her home, no loss of consciousness. She is not anticoagulated. She struck the left side of her face against the floor, was noted to have contusion and laceration to the left eyebrow. C/o pain at that site, mild. Also c/o mild aching pain to left ribs. No SOB or CP. Location:    Quality:      Severity:    Duration:   Timing:      Context:    Modifying factors:   Associated symptoms:       The patient's medical, surgical, family, and social history on file were reviewed by me today.       Past Medical History:   Diagnosis Date    Alzheimer's dementia without behavioral disturbance ([facility] 8/3/2019    Essential hypertension     Family history of skin cancer     brother    LBBB (left bundle branch block) 8/3/2019    Radiation exposure     breast cancer left    Skin cancer      Past Surgical History:   Procedure Laterality Date    CARDIAC SURG PROCEDURE UNLIST       shunt       PCP: Amanda Humphrey DO    Past History     Past Medical History:  Past Medical History:   Diagnosis Date    Alzheimer's dementia without behavioral disturbance ([facility] 8/3/2019    Essential hypertension     Family history of skin cancer     brother    LBBB (left bundle branch block) 8/3/2019    Radiation exposure     breast cancer left    Skin cancer        Past Surgical History:  Past Surgical History:   Procedure Laterality Date    CARDIAC SURG PROCEDURE UNLIST  shunt       Family History:  Family History   Problem Relation Age of Onset    No Known Problems Mother     No Known Problems Father        Social History:  Social History     Tobacco Use    Smoking status: Never Smoker    Smokeless tobacco: Never Used   Substance Use Topics    Alcohol use: Yes     Alcohol/week: 2.0 standard drinks     Types: 1 Cans of beer, 1 Glasses of wine per week    Drug use: Not on file       Allergies: Allergies   Allergen Reactions    Codeine Nausea Only    Penicillins Hives       Current Medications:  No current facility-administered medications on file prior to encounter. Current Outpatient Medications on File Prior to Encounter   Medication Sig Dispense Refill    memantine (NAMENDA) 10 mg tablet Take 1 Tab by mouth two (2) times a day. 180 Tab 3    donepeziL (ARICEPT) 10 mg tablet Take 1 Tab by mouth nightly. 90 Tab 3    sertraline (ZOLOFT) 100 mg tablet Take 1 Tab by mouth nightly. 90 Tab 2    losartan (COZAAR) 50 mg tablet Take 1 Tab by mouth daily. 90 Tab 2    metFORMIN ER (GLUCOPHAGE XR) 500 mg tablet 500 mg two (2) times a day. Review of Systems   Review of Systems   Constitutional: Negative for fatigue and fever. Respiratory: Negative for shortness of breath. Cardiovascular: Positive for chest pain. Gastrointestinal: Negative for abdominal pain. Neurological: Negative for syncope and headaches. All other systems reviewed and are negative. Physical Exam   Vital Signs  Patient Vitals for the past 8 hrs:   Temp Pulse Resp BP SpO2   01/14/21 1746 97.9 °F (36.6 °C) (!) 101 16 (!) 144/67 95 %          Physical Exam  Vitals signs and nursing note reviewed. Constitutional:       General: She is not in acute distress. Appearance: Normal appearance. She is well-developed. She is not ill-appearing. HENT:      Head: Normocephalic. Contusion and laceration present. Jaw: There is normal jaw occlusion. No malocclusion.         Comments: Small contusion and 1 cm laceration laceration left eyebrow/forehead, uncomplicated     Nose: Nose normal. No nasal deformity, signs of injury or nasal tenderness. Mouth/Throat:      Mouth: Mucous membranes are moist.   Eyes:      General:         Right eye: No discharge. Left eye: No discharge. Extraocular Movements: Extraocular movements intact. Conjunctiva/sclera: Conjunctivae normal.      Pupils:      Right eye: Pupil is not round and not reactive. Comments: Ecchymosis around left orbit (black eye)    Right pupil is not reactive but appears to be secondary to prior surgery   Neck:      Musculoskeletal: Full passive range of motion without pain, normal range of motion and neck supple. Cardiovascular:      Rate and Rhythm: Normal rate and regular rhythm. Heart sounds: Normal heart sounds. No murmur. Pulmonary:      Effort: Pulmonary effort is normal. No respiratory distress. Breath sounds: Normal breath sounds. No wheezing. Chest:      Chest wall: Tenderness present. No deformity or swelling. Abdominal:      General: There is no distension. Palpations: Abdomen is soft. Tenderness: There is no abdominal tenderness. Musculoskeletal:         General: No deformity. Right hip: Normal.      Left hip: Normal.      Right knee: Normal.      Left knee: Normal.        Hands:       Comments: Minor bruising in indicated areas, no significant tenderness or deformity, no x-rays indicated   Skin:     General: Skin is warm and dry. Findings: No rash. Neurological:      General: No focal deficit present. Mental Status: She is alert. She is disoriented. Comments: Alert, pleasantly demented   Psychiatric:         Speech: Speech normal.         Behavior: Behavior normal.         Cognition and Memory: Cognition is impaired.          Diagnostic Study Results   Labs  Recent Results (from the past 24 hour(s))   CBC WITH AUTOMATED DIFF    Collection Time: 01/14/21  6:34 PM   Result Value Ref Range    WBC 14.4 (H) 3.6 - 11.0 K/uL    RBC 4.54 3.80 - 5.20 M/uL    HGB 12.6 11.5 - 16.0 g/dL    HCT 40.1 35.0 - 47.0 %    MCV 88.3 80.0 - 99.0 FL    MCH 27.8 26.0 - 34.0 PG    MCHC 31.4 30.0 - 36.5 g/dL    RDW 15.3 (H) 11.5 - 14.5 %    PLATELET 861 752 - 299 K/uL    MPV 10.3 8.9 - 12.9 FL    NRBC 0.0 0  WBC    ABSOLUTE NRBC 0.00 0.00 - 0.01 K/uL    NEUTROPHILS 81 (H) 32 - 75 %    LYMPHOCYTES 12 12 - 49 %    MONOCYTES 6 5 - 13 %    EOSINOPHILS 1 0 - 7 %    BASOPHILS 0 0 - 1 %    IMMATURE GRANULOCYTES 0 0.0 - 0.5 %    ABS. NEUTROPHILS 11.7 (H) 1.8 - 8.0 K/UL    ABS. LYMPHOCYTES 1.7 0.8 - 3.5 K/UL    ABS. MONOCYTES 0.8 0.0 - 1.0 K/UL    ABS. EOSINOPHILS 0.1 0.0 - 0.4 K/UL    ABS. BASOPHILS 0.1 0.0 - 0.1 K/UL    ABS. IMM. GRANS. 0.1 (H) 0.00 - 0.04 K/UL    DF AUTOMATED     METABOLIC PANEL, COMPREHENSIVE    Collection Time: 01/14/21  6:34 PM   Result Value Ref Range    Sodium 135 (L) 136 - 145 mmol/L    Potassium 4.4 3.5 - 5.1 mmol/L    Chloride 104 97 - 108 mmol/L    CO2 28 21 - 32 mmol/L    Anion gap 3 (L) 5 - 15 mmol/L    Glucose 138 (H) 65 - 100 mg/dL    BUN 14 6 - 20 MG/DL    Creatinine 1.00 0.55 - 1.02 MG/DL    BUN/Creatinine ratio 14 12 - 20      GFR est AA >60 >60 ml/min/1.73m2    GFR est non-AA 53 (L) >60 ml/min/1.73m2    Calcium 9.1 8.5 - 10.1 MG/DL    Bilirubin, total 0.4 0.2 - 1.0 MG/DL    ALT (SGPT) 20 12 - 78 U/L    AST (SGOT) 19 15 - 37 U/L    Alk.  phosphatase 86 45 - 117 U/L    Protein, total 8.0 6.4 - 8.2 g/dL    Albumin 3.6 3.5 - 5.0 g/dL    Globulin 4.4 (H) 2.0 - 4.0 g/dL    A-G Ratio 0.8 (L) 1.1 - 2.2     EKG, 12 LEAD, INITIAL    Collection Time: 01/14/21  7:35 PM   Result Value Ref Range    Ventricular Rate 95 BPM    Atrial Rate 95 BPM    P-R Interval 194 ms    QRS Duration 150 ms    Q-T Interval 416 ms    QTC Calculation (Bezet) 522 ms    Calculated P Axis 73 degrees    Calculated R Axis -20 degrees    Calculated T Axis 90 degrees    Diagnosis Normal sinus rhythm  Left bundle branch block  No previous ECGs available         Radiologic Studies  XR RIBS LT W PA CXR MIN 3 V   Final Result   IMPRESSION: No acute fracture or process. CT HEAD WO CONT   Final Result   IMPRESSION:    Stable ventriculomegaly with ventriculostomy tube in place. No evidence for acute intracranial trauma      CT MAXILLOFACIAL WO CONT   Final Result   IMPRESSION:    No fracture. Left periorbital soft tissue swelling        CT Results  (Last 48 hours)               01/14/21 1906  CT HEAD WO CONT Final result    Impression:  IMPRESSION:    Stable ventriculomegaly with ventriculostomy tube in place. No evidence for acute intracranial trauma       Narrative:  EXAM: CT HEAD WO CONT       INDICATION: Head trauma after ground-level fall. Baseline dementia. COMPARISON: 1/24/2020. CONTRAST: None. TECHNIQUE: Unenhanced CT of the head was performed using 5 mm images. Brain and   bone windows were generated. Coronal and sagittal reformats. CT dose reduction   was achieved through use of a standardized protocol tailored for this   examination and automatic exposure control for dose modulation. FINDINGS:   The ventricles and sulci are prominent and stable in size, shape and   configuration with a right posterior parietal ventriculostomy tube in place. .. There is no significant white matter disease. There is no intracranial   hemorrhage, extra-axial collection, or mass effect. The basilar cisterns are   open. No CT evidence of acute infarct. There is a left forehead scalp hematoma. The bone windows demonstrate no abnormalities. The visualized portions of the   paranasal sinuses and mastoid air cells are clear. Bilateral vertebral artery   calcification is seen. 01/14/21 1906  CT MAXILLOFACIAL WO CONT Final result    Impression:  IMPRESSION:    No fracture.  Left periorbital soft tissue swelling       Narrative:  EXAM: CT MAXILLOFACIAL WO CONT INDICATION: Facial pain after fall       COMPARISON: None. CONTRAST:   None. TECHNIQUE:  Multislice helical CT of the facial bones was performed in the axial   plane without intravenous contrast administration. Coronal and sagittal   reformations were generated. CT dose reduction was achieved through use of a   standardized protocol tailored for this examination and automatic exposure   control for dose modulation. FINDINGS:       Bones: There is no fracture or other osseous abnormality. Paranasal sinuses: Clear. Left gina bullosa. Septal deviation to the left. Orbits: The globes, optic nerves, and extraocular muscles are within normal   limits. . Left periorbital soft tissue swelling. Base of brain and soft tissues: Within normal limits. No evidence of mass. .               CXR Results  (Last 48 hours)    None          Procedures   Wound Closure by Adhesive    Date/Time: 1/14/2021 10:27 PM  Performed by: Dolores Hodge MD  Authorized by: Dolores Hodge MD     Consent:     Consent obtained:  Verbal    Consent given by:  Patient and guardian  Anesthesia (see MAR for exact dosages): Anesthesia method:  None  Laceration details:     Location:  Face    Face location:  Forehead    Length (cm):  1  Repair type:     Repair type:  Simple  Treatment:     Area cleansed with:  Soap and water    Visualized foreign bodies/material removed: no    Skin repair:     Repair method:  Tissue adhesive  Approximation:     Approximation:  Close  Post-procedure details:     Dressing:  Open (no dressing)    Patient tolerance of procedure: Tolerated well, no immediate complications        Medical Decision Making     I reviewed the patient's most recent Emergency Dept notes and diagnostic tests  in formulating my MDM on today's visit.     Provider Notes (Medical Decision Making):   70-year-old female presenting with minor fall at home, with small laceration and contusion to left forehead/eyebrow, also with left chest wall tenderness. CT scans reassuring, no acute injuries, consistent with my exam.    Tissue adhesive was applied to close the minor facial wound. X-rays of the left chest are pending to evaluate for possible rib fracture versus contusion. Jeffry Perez MD  10:20 PM    Rib contusion, no fractures identified. Artemio Patel MD  11:00 PM      Consults:    Social History     Tobacco Use    Smoking status: Never Smoker    Smokeless tobacco: Never Used   Substance Use Topics    Alcohol use: Yes     Alcohol/week: 2.0 standard drinks     Types: 1 Cans of beer, 1 Glasses of wine per week    Drug use: Not on file     No data found. Prescriptions from today's ED visit:  Current Discharge Medication List      START taking these medications    Details   ibuprofen (MOTRIN) 600 mg tablet Take 1 Tab by mouth every six (6) hours as needed for Pain. Qty: 20 Tab, Refills: 0              Medications Administered during ED course:  Medications   acetaminophen (TYLENOL) 325 mg tablet (has no administration in time range)   acetaminophen (TYLENOL) tablet 650 mg (650 mg Oral Given 1/14/21 2227)   traMADoL (ULTRAM) tablet 100 mg (100 mg Oral Given 1/14/21 2227)          Diagnosis and Disposition     Disposition:  Discharged    Clinical Impression:   1. Facial contusion, initial encounter    2. Forehead laceration, initial encounter    3. Contusion of left chest wall, initial encounter        Attestation:  I personally performed the services described in this documentation on this date 1/14/2021 for patient Julisa Mckinley. Jeffry Perez MD        I was the first provider for this patient on this visit. To the best of my ability I reviewed relevant prior medical records, electrocardiograms, laboratories, and radiologic studies.   The patient's presenting problems were discussed, and the patient was in agreement with the care plan formulated and outlined with them.      Regina Hylton MD    Please note that this dictation was completed with Dragon voice recognition software. Quite often unanticipated grammatical, syntax, homophones, and other interpretive errors are inadvertently transcribed by the computer software. Please disregard these errors and excuse any errors that have escaped final proofreading.

## 2021-01-15 NOTE — DISCHARGE INSTRUCTIONS
Your CT scan and x-rays are reassuring. We have closed the small laceration on your forehead with tissue adhesive. This will come off on its own over the next week or so. Do not apply antibiotic ointment. Return to the emergency department if you have any worsening shortness of breath, chest pain, difficulty breathing, headaches, or other concerning symptoms.

## 2021-03-12 ENCOUNTER — HOSPICE ADMISSION (OUTPATIENT)
Dept: HOSPICE | Facility: HOSPICE | Age: 81
End: 2021-03-12
Payer: MEDICARE

## 2021-03-12 ENCOUNTER — OFFICE VISIT (OUTPATIENT)
Dept: NEUROLOGY | Age: 81
End: 2021-03-12
Payer: MEDICARE

## 2021-03-12 ENCOUNTER — DOCUMENTATION ONLY (OUTPATIENT)
Dept: NEUROLOGY | Age: 81
End: 2021-03-12

## 2021-03-12 VITALS
BODY MASS INDEX: 21.76 KG/M2 | OXYGEN SATURATION: 97 % | HEART RATE: 88 BPM | WEIGHT: 152 LBS | DIASTOLIC BLOOD PRESSURE: 78 MMHG | HEIGHT: 70 IN | SYSTOLIC BLOOD PRESSURE: 136 MMHG

## 2021-03-12 DIAGNOSIS — F41.9 ANXIETY AND DEPRESSION: ICD-10-CM

## 2021-03-12 DIAGNOSIS — G30.8 ALZHEIMER'S DISEASE OF OTHER ONSET WITHOUT BEHAVIORAL DISTURBANCE: Primary | ICD-10-CM

## 2021-03-12 DIAGNOSIS — F02.80 ALZHEIMER'S DISEASE OF OTHER ONSET WITHOUT BEHAVIORAL DISTURBANCE: Primary | ICD-10-CM

## 2021-03-12 DIAGNOSIS — G91.2 NPH (NORMAL PRESSURE HYDROCEPHALUS) (HCC): ICD-10-CM

## 2021-03-12 DIAGNOSIS — F32.A ANXIETY AND DEPRESSION: ICD-10-CM

## 2021-03-12 DIAGNOSIS — R29.6 FALLS FREQUENTLY: ICD-10-CM

## 2021-03-12 DIAGNOSIS — R45.3 APATHY: ICD-10-CM

## 2021-03-12 PROCEDURE — 99483 ASSMT & CARE PLN PT COG IMP: CPT | Performed by: PSYCHIATRY & NEUROLOGY

## 2021-03-12 PROCEDURE — G8510 SCR DEP NEG, NO PLAN REQD: HCPCS | Performed by: PSYCHIATRY & NEUROLOGY

## 2021-03-12 RX ORDER — FLUOROURACIL 50 MG/G
1 CREAM TOPICAL DAILY
COMMUNITY
Start: 2021-03-14 | End: 2021-09-29

## 2021-03-12 RX ORDER — SERTRALINE HYDROCHLORIDE 100 MG/1
100 TABLET, FILM COATED ORAL
Qty: 90 TAB | Refills: 2 | Status: SHIPPED | OUTPATIENT
Start: 2021-03-12 | End: 2021-03-15

## 2021-03-12 NOTE — PROGRESS NOTES
Chief Complaint   Patient presents with    Follow-up     Alzheimer's disease, increase in falls, weight loss and decrease appetite     Visit Vitals  /78 (BP 1 Location: Left upper arm, BP Patient Position: Sitting)   Pulse 88   Ht 5' 10\" (1.778 m)   Wt 152 lb (68.9 kg)   SpO2 97%   BMI 21.81 kg/m²

## 2021-03-12 NOTE — LETTER
3/12/2021    Patient: Susannah Rodrigez   YOB: 1940   Date of Visit: 3/12/2021     Claudia Fong DO  [address] [address] [address] [address]  Via Fax: [phone]    Dear Claudia Fong DO,      Thank you for referring Ms. Susannah Rodrigez to [address] [place] [address] for evaluation. My notes for this consultation are attached. If you have questions, please do not hesitate to call me. I look forward to following your patient along with you. Sincerely,    Lindsey Gold DO    3/12/2021     Patient:  Susannah Rodrigze   YOB: 1940  Date of Visit: 3/12/2021      Dear Claudia Fong, [address] [place] [address]  [address] [place] [address] [address] [address]  Via Fax: [phone]:      I was requested by Ziyad Steen DO to evaluate Ms. Susannah Rodrigez  for   Chief Complaint   Patient presents with    Follow-up     Alzheimer's disease, increase in falls, weight loss and decrease appetite   . I am recommending the following:     Diagnoses and all orders for this visit:    1. [facility] disease [facility] other onset [facility]  -     REFERRAL TO HOSPICE    2. Falls frequently  -     REFERRAL TO HOSPICE    3. Apathy    4. NPH (normal pressure hydrocephalus) ([facility]    5. Anxiety and depression    Other orders  -     sertraline (ZOLOFT) 100 mg tablet; Take 1 Tab by mouth nightly.        ----------------------------------------------------------------------------------------------------------------------  Below is my encounter:       Chief Complaint   Patient presents with    Follow-up     Alzheimer's disease, increase in falls, weight loss and decrease appetite       HPI    79year-old woman following up. She has dementia, Alzheimer's/NPH. She is here with her daughter whom she lives with full-time. When I saw her last she had just moved in with her daughter. Since that time she has declined with multiple recurrent falls.   She is weak.  She is sedentary most of the time. She is now willing to participate in therapies. She has had so many falls that she has become so weak. She was in the emergency room January 14 and I reviewed that medical record. She had a fall from standing hitting the left side of her face with a black eye. I looked at pictures. She is also lost about 30 pounds of weight in the past 6 months. All she wants to do is eat sweet food or fast food. Everything prepared for her does not appeal to her palate. She is argumentative. Does not cooperate with bathing. If she is not put into bed at night, she will stay put in her same position all night. She can get angry easily. They have a nurse that comes to the home a few days a week while her daughter is at work but the rest of the time her daughter is doing full-time care. Head CT done in the ER did not show any significant change to previous with known ventriculomegaly.         Review of Systems   Unable to perform ROS: Dementia       Past Medical History:   Diagnosis Date    Alzheimer's dementia without behavioral disturbance ([facility] 8/3/2019    Essential hypertension     Family history of skin cancer     brother    LBBB (left bundle branch block) 8/3/2019    Radiation exposure     breast cancer left    Skin cancer      Family History   Problem Relation Age of Onset    No Known Problems Mother     No Known Problems Father      Social History     Socioeconomic History    Marital status:      Spouse name: Not on file    Number of children: Not on file    Years of education: Not on file    Highest education level: Not on file   Occupational History    Not on file   Social Needs    Financial resource strain: Not on file    Food insecurity     Worry: Not on file     Inability: Not on file    Transportation needs     Medical: Not on file     Non-medical: Not on file   Tobacco Use    Smoking status: Never Smoker    Smokeless tobacco: Never Used Substance and Sexual Activity    Alcohol use: Yes     Alcohol/week: 2.0 standard drinks     Types: 1 Cans of beer, 1 Glasses of wine per week    Drug use: Not on file    Sexual activity: Not on file   Lifestyle    Physical activity     Days per week: Not on file     Minutes per session: Not on file    Stress: Not on file   Relationships    Social connections     Talks on phone: Not on file     Gets together: Not on file     Attends [employer] service: Not on file     Active member of club or organization: Not on file     Attends meetings of clubs or organizations: Not on file     Relationship status: Not on file    Intimate partner violence     Fear of current or ex partner: Not on file     Emotionally abused: Not on file     Physically abused: Not on file     Forced sexual activity: Not on file   Other Topics Concern    Not on file   Social History Narrative    Not on file     Allergies   Allergen Reactions    Codeine Nausea Only    Penicillins Hives         Current Outpatient Medications   Medication Sig    fluorouraciL (EFUDEX) 5 % chemo cream     cranberry extract 450 mg tab tablet Take 450 mg by mouth.  sertraline (ZOLOFT) 100 mg tablet Take 1 Tab by mouth nightly.  ibuprofen (MOTRIN) 600 mg tablet Take 1 Tab by mouth every six (6) hours as needed for Pain.  losartan (COZAAR) 50 mg tablet Take 1 Tab by mouth daily.  metFORMIN ER (GLUCOPHAGE XR) 500 mg tablet 500 mg two (2) times a day. No current facility-administered medications for this visit. Neurologic Exam     Mental Status   Elderly woman awake and alert. Poor eye contact. Not very engaging. Answers with one-word phrases. She does not know the month or the year. When I tell her what month it is she can tell me the next holiday is Easter. She can spell ocean forward but not backward. She can repeat sentences. She knows who the president is after a lot of careful thought.   She can follow two-step commands easily. She admits to being angry all the time but not suicidal.    Pupils are equal, face grossly symmetric  Speech limited to mostly single words  Moves her upper extremities easily wiping her nose. She can lift both legs while seated against gravity and extend briefly. No abnormal movements  Gait using a Rollator is wide and mildly shuffling       Visit Vitals  /78 (BP 1 Location: Left upper arm, BP Patient Position: Sitting)   Pulse 88   Ht 5' 10\" (1.778 m)   Wt 152 lb (68.9 kg)   SpO2 97%   BMI 21.81 kg/m²       Assessment and Plan   Diagnoses and all orders for this visit:    1. Alzheimer's disease of other onset without behavioral disturbance ([facility]  -     REFERRAL TO HOSPICE    2. Falls frequently  -     REFERRAL TO HOSPICE    3. Apathy    4. NPH (normal pressure hydrocephalus) ([facility]    5. Anxiety and depression    Other orders  -     sertraline (ZOLOFT) 100 mg tablet; Take 1 Tab by mouth nightly. 23-year-old woman who has advancing dementia. She is slowly declining. She is losing weight progressively. Having more behavioral changes. Language has declined dramatically. I think it would be appropriate to involve hospice to discuss further goals of care and possibly open up further services for care at home. I do not see that Aricept or Namenda is offering any benefit given the decline so we are going to stop those medications. Continue Zoloft only. Her daughter is doing the best she can to take care of her and she does seem safe in her environment. I stressed to the patient she needs to use her walker/Rollator constantly however that is a adams every day to make it happen. I briefly discussed with her what she would want if she stopped eating and she declined adamantly having a feeding tube. I discussed with the daughter medical legal issues to make sure there are medical directives and power of  is in place which there are.   I would like to see her in about 3-5 months if possible. I discussed with the daughter briefly what hospice entails but will allow hospice to further clarify once they reach out to family. Daughter is in agreement with the plan. I reviewed and decided to continue the current medications. This clinical note was dictated with an electronic dictation software that can make unintentional errors. If there are any questions, please contact me directly for clarification. Thank you for giving me the opportunity to assist in the care of Ms. Emily Hart. If you have questions, please do not hesitate to contact me.         Sincerely,      2 [facility], DO  Neurologist  Brain Injury Medicine  Diplomate ABPN

## 2021-03-12 NOTE — PROGRESS NOTES
Chief Complaint   Patient presents with    Follow-up     Alzheimer's disease, increase in falls, weight loss and decrease appetite       HPI    79year-old woman following up. She has dementia, Alzheimer's/NPH. She is here with her daughter whom she lives with full-time. When I saw her last she had just moved in with her daughter. Since that time she has declined with multiple recurrent falls. She is weak. She is sedentary most of the time. She is now willing to participate in therapies. She has had so many falls that she has become so weak. She was in the emergency room January 14 and I reviewed that medical record. She had a fall from standing hitting the left side of her face with a black eye. I looked at pictures. She is also lost about 30 pounds of weight in the past 6 months. All she wants to do is eat sweet food or fast food. Everything prepared for her does not appeal to her palate. She is argumentative. Does not cooperate with bathing. If she is not put into bed at night, she will stay put in her same position all night. She can get angry easily. They have a nurse that comes to the home a few days a week while her daughter is at work but the rest of the time her daughter is doing full-time care. Head CT done in the ER did not show any significant change to previous with known ventriculomegaly.         Review of Systems   Unable to perform ROS: Dementia       Past Medical History:   Diagnosis Date    Alzheimer's dementia without behavioral disturbance ([facility] [address] 8/3/2019    Essential hypertension     Family history of skin cancer     brother    LBBB (left bundle branch block) 8/3/2019    Radiation exposure     breast cancer left    Skin cancer      Family History   Problem Relation Age of Onset    No Known Problems Mother     No Known Problems Father      Social History     Socioeconomic History    Marital status:      Spouse name: Not on file    Number of children: Not on file    Years of education: Not on file    Highest education level: Not on file   Occupational History    Not on file   Social Needs    Financial resource strain: Not on file    Food insecurity     Worry: Not on file     Inability: Not on file    Transportation needs     Medical: Not on file     Non-medical: Not on file   Tobacco Use    Smoking status: Never Smoker    Smokeless tobacco: Never Used   Substance and Sexual Activity    Alcohol use: Yes     Alcohol/week: 2.0 standard drinks     Types: 1 Cans of beer, 1 Glasses of wine per week    Drug use: Not on file    Sexual activity: Not on file   Lifestyle    Physical activity     Days per week: Not on file     Minutes per session: Not on file    Stress: Not on file   Relationships    Social connections     Talks on phone: Not on file     Gets together: Not on file     Attends [employer] service: Not on file     Active member of club or organization: Not on file     Attends meetings of clubs or organizations: Not on file     Relationship status: Not on file    Intimate partner violence     Fear of current or ex partner: Not on file     Emotionally abused: Not on file     Physically abused: Not on file     Forced sexual activity: Not on file   Other Topics Concern    Not on file   Social History Narrative    Not on file     Allergies   Allergen Reactions    Codeine Nausea Only    Penicillins Hives         Current Outpatient Medications   Medication Sig    fluorouraciL (EFUDEX) 5 % chemo cream     cranberry extract 450 mg tab tablet Take 450 mg by mouth.  sertraline (ZOLOFT) 100 mg tablet Take 1 Tab by mouth nightly.  ibuprofen (MOTRIN) 600 mg tablet Take 1 Tab by mouth every six (6) hours as needed for Pain.  losartan (COZAAR) 50 mg tablet Take 1 Tab by mouth daily.  metFORMIN ER (GLUCOPHAGE XR) 500 mg tablet 500 mg two (2) times a day. No current facility-administered medications for this visit.             Neurologic Exam Mental Status   Elderly woman awake and alert. Poor eye contact. Not very engaging. Answers with one-word phrases. She does not know the month or the year. When I tell her what month it is she can tell me the next holiday is Easter. She can spell ocean forward but not backward. She can repeat sentences. She knows who the president is after a lot of careful thought. She can follow two-step commands easily. She admits to being angry all the time but not suicidal.    Pupils are equal, face grossly symmetric  Speech limited to mostly single words  Moves her upper extremities easily wiping her nose. She can lift both legs while seated against gravity and extend briefly. No abnormal movements  Gait using a Rollator is wide and mildly shuffling       Visit Vitals  /78 (BP 1 Location: Left upper arm, BP Patient Position: Sitting)   Pulse 88   Ht 5' 10\" (1.778 m)   Wt 152 lb (68.9 kg)   SpO2 97%   BMI 21.81 kg/m²       Assessment and Plan   Diagnoses and all orders for this visit:    1. Alzheimer's disease of other onset without behavioral disturbance ([facility] [address]  -     REFERRAL TO HOSPICE    2. Falls frequently  -     REFERRAL TO HOSPICE    3. Apathy    4. NPH (normal pressure hydrocephalus) ([address]    5. Anxiety and depression    Other orders  -     sertraline (ZOLOFT) 100 mg tablet; Take 1 Tab by mouth nightly. 70-year-old woman who has advancing dementia. She is slowly declining. She is losing weight progressively. Having more behavioral changes. Language has declined dramatically. I think it would be appropriate to involve hospice to discuss further goals of care and possibly open up further services for care at home. I do not see that Aricept or Namenda is offering any benefit given the decline so we are going to stop those medications. Continue Zoloft only. Her daughter is doing the best she can to take care of her and she does seem safe in her environment.   I stressed to the patient she needs to use her walker/Rollator constantly however that is a adams every day to make it happen. I briefly discussed with her what she would want if she stopped eating and she declined adamantly having a feeding tube. I discussed with the daughter medical legal issues to make sure there are medical directives and power of  is in place which there are. I would like to see her in about 3-5 months if possible. I discussed with the daughter briefly what hospice entails but will allow hospice to further clarify once they reach out to family. Daughter is in agreement with the plan. I reviewed and decided to continue the current medications. This clinical note was dictated with an electronic dictation software that can make unintentional errors. If there are any questions, please contact me directly for clarification.       [address] [facility], [facility] [address]  Diplomate DEMIANN

## 2021-03-14 ENCOUNTER — HOME CARE VISIT (OUTPATIENT)
Dept: SCHEDULING | Facility: HOME HEALTH | Age: 81
End: 2021-03-14
Payer: MEDICARE

## 2021-03-14 PROCEDURE — 0651 HSPC ROUTINE HOME CARE

## 2021-03-14 PROCEDURE — G0299 HHS/HOSPICE OF RN EA 15 MIN: HCPCS

## 2021-03-14 PROCEDURE — 3336500001 HSPC ELECTION

## 2021-03-15 ENCOUNTER — HOME CARE VISIT (OUTPATIENT)
Dept: HOSPICE | Facility: HOSPICE | Age: 81
End: 2021-03-15
Payer: MEDICARE

## 2021-03-15 VITALS — HEART RATE: 88 BPM | RESPIRATION RATE: 18 BRPM

## 2021-03-15 VITALS
OXYGEN SATURATION: 97 % | SYSTOLIC BLOOD PRESSURE: 139 MMHG | BODY MASS INDEX: 21.76 KG/M2 | DIASTOLIC BLOOD PRESSURE: 64 MMHG | WEIGHT: 152 LBS | HEART RATE: 70 BPM | RESPIRATION RATE: 20 BRPM | HEIGHT: 70 IN

## 2021-03-15 PROCEDURE — HOSPICE MEDICATION HC HH HOSPICE MEDICATION

## 2021-03-15 PROCEDURE — 0651 HSPC ROUTINE HOME CARE

## 2021-03-15 PROCEDURE — G0299 HHS/HOSPICE OF RN EA 15 MIN: HCPCS

## 2021-03-16 ENCOUNTER — HOME CARE VISIT (OUTPATIENT)
Dept: HOSPICE | Facility: HOSPICE | Age: 81
End: 2021-03-16
Payer: MEDICARE

## 2021-03-16 PROCEDURE — 0651 HSPC ROUTINE HOME CARE

## 2021-03-17 PROBLEM — Z51.5 HOSPICE CARE PATIENT: Status: ACTIVE | Noted: 2021-03-17

## 2021-03-17 PROCEDURE — 0651 HSPC ROUTINE HOME CARE

## 2021-03-18 ENCOUNTER — HOME CARE VISIT (OUTPATIENT)
Dept: HOSPICE | Facility: HOSPICE | Age: 81
End: 2021-03-18
Payer: MEDICARE

## 2021-03-18 PROCEDURE — 0651 HSPC ROUTINE HOME CARE

## 2021-03-19 ENCOUNTER — HOME CARE VISIT (OUTPATIENT)
Dept: SCHEDULING | Facility: HOME HEALTH | Age: 81
End: 2021-03-19
Payer: MEDICARE

## 2021-03-19 VITALS — DIASTOLIC BLOOD PRESSURE: 80 MMHG | RESPIRATION RATE: 18 BRPM | HEART RATE: 80 BPM | SYSTOLIC BLOOD PRESSURE: 132 MMHG

## 2021-03-19 PROCEDURE — 0651 HSPC ROUTINE HOME CARE

## 2021-03-19 PROCEDURE — G0155 HHCP-SVS OF CSW,EA 15 MIN: HCPCS

## 2021-03-19 PROCEDURE — G0299 HHS/HOSPICE OF RN EA 15 MIN: HCPCS

## 2021-03-19 PROCEDURE — G0156 HHCP-SVS OF AIDE,EA 15 MIN: HCPCS

## 2021-03-20 PROCEDURE — 0651 HSPC ROUTINE HOME CARE

## 2021-03-21 PROCEDURE — 0651 HSPC ROUTINE HOME CARE

## 2021-03-22 PROCEDURE — 0651 HSPC ROUTINE HOME CARE

## 2021-03-23 ENCOUNTER — HOME CARE VISIT (OUTPATIENT)
Dept: SCHEDULING | Facility: HOME HEALTH | Age: 81
End: 2021-03-23
Payer: MEDICARE

## 2021-03-23 VITALS
HEART RATE: 88 BPM | DIASTOLIC BLOOD PRESSURE: 80 MMHG | TEMPERATURE: 98.7 F | SYSTOLIC BLOOD PRESSURE: 160 MMHG | RESPIRATION RATE: 18 BRPM

## 2021-03-23 PROCEDURE — 0651 HSPC ROUTINE HOME CARE

## 2021-03-23 PROCEDURE — G0299 HHS/HOSPICE OF RN EA 15 MIN: HCPCS

## 2021-03-24 PROCEDURE — 0651 HSPC ROUTINE HOME CARE

## 2021-03-25 PROCEDURE — 0651 HSPC ROUTINE HOME CARE

## 2021-03-26 ENCOUNTER — HOME CARE VISIT (OUTPATIENT)
Dept: HOSPICE | Facility: HOSPICE | Age: 81
End: 2021-03-26
Payer: MEDICARE

## 2021-03-26 ENCOUNTER — HOME CARE VISIT (OUTPATIENT)
Dept: SCHEDULING | Facility: HOME HEALTH | Age: 81
End: 2021-03-26
Payer: MEDICARE

## 2021-03-26 PROCEDURE — 0651 HSPC ROUTINE HOME CARE

## 2021-03-26 PROCEDURE — G0156 HHCP-SVS OF AIDE,EA 15 MIN: HCPCS

## 2021-03-27 PROCEDURE — 0651 HSPC ROUTINE HOME CARE

## 2021-03-28 PROCEDURE — 0651 HSPC ROUTINE HOME CARE

## 2021-03-29 PROCEDURE — 0651 HSPC ROUTINE HOME CARE

## 2021-03-30 ENCOUNTER — HOME CARE VISIT (OUTPATIENT)
Dept: SCHEDULING | Facility: HOME HEALTH | Age: 81
End: 2021-03-30
Payer: MEDICARE

## 2021-03-30 VITALS — RESPIRATION RATE: 18 BRPM | SYSTOLIC BLOOD PRESSURE: 132 MMHG | DIASTOLIC BLOOD PRESSURE: 78 MMHG | HEART RATE: 69 BPM

## 2021-03-30 PROCEDURE — G0299 HHS/HOSPICE OF RN EA 15 MIN: HCPCS

## 2021-03-30 PROCEDURE — 0651 HSPC ROUTINE HOME CARE

## 2021-03-31 PROCEDURE — 0651 HSPC ROUTINE HOME CARE

## 2021-04-01 PROCEDURE — 0651 HSPC ROUTINE HOME CARE

## 2021-04-02 ENCOUNTER — HOME CARE VISIT (OUTPATIENT)
Dept: SCHEDULING | Facility: HOME HEALTH | Age: 81
End: 2021-04-02
Payer: MEDICARE

## 2021-04-02 PROCEDURE — G0156 HHCP-SVS OF AIDE,EA 15 MIN: HCPCS

## 2021-04-02 PROCEDURE — 0651 HSPC ROUTINE HOME CARE

## 2021-04-03 PROCEDURE — 0651 HSPC ROUTINE HOME CARE

## 2021-04-04 PROCEDURE — 0651 HSPC ROUTINE HOME CARE

## 2021-04-05 PROCEDURE — 0651 HSPC ROUTINE HOME CARE

## 2021-04-06 ENCOUNTER — HOME CARE VISIT (OUTPATIENT)
Dept: SCHEDULING | Facility: HOME HEALTH | Age: 81
End: 2021-04-06
Payer: MEDICARE

## 2021-04-06 VITALS
HEART RATE: 80 BPM | TEMPERATURE: 99.1 F | DIASTOLIC BLOOD PRESSURE: 74 MMHG | SYSTOLIC BLOOD PRESSURE: 120 MMHG | RESPIRATION RATE: 18 BRPM

## 2021-04-06 PROCEDURE — 0651 HSPC ROUTINE HOME CARE

## 2021-04-06 PROCEDURE — G0299 HHS/HOSPICE OF RN EA 15 MIN: HCPCS

## 2021-04-07 PROCEDURE — 0651 HSPC ROUTINE HOME CARE

## 2021-04-08 PROCEDURE — 0651 HSPC ROUTINE HOME CARE

## 2021-04-09 PROCEDURE — 0651 HSPC ROUTINE HOME CARE

## 2021-04-10 PROCEDURE — 0651 HSPC ROUTINE HOME CARE

## 2021-04-11 PROCEDURE — 0651 HSPC ROUTINE HOME CARE

## 2021-04-12 PROCEDURE — 0651 HSPC ROUTINE HOME CARE

## 2021-04-13 ENCOUNTER — HOME CARE VISIT (OUTPATIENT)
Dept: HOSPICE | Facility: HOSPICE | Age: 81
End: 2021-04-13
Payer: MEDICARE

## 2021-04-13 VITALS
HEART RATE: 101 BPM | RESPIRATION RATE: 16 BRPM | DIASTOLIC BLOOD PRESSURE: 76 MMHG | TEMPERATURE: 97.7 F | SYSTOLIC BLOOD PRESSURE: 150 MMHG

## 2021-04-13 PROCEDURE — 0651 HSPC ROUTINE HOME CARE

## 2021-04-13 PROCEDURE — G0299 HHS/HOSPICE OF RN EA 15 MIN: HCPCS

## 2021-04-14 PROCEDURE — 0651 HSPC ROUTINE HOME CARE

## 2021-04-15 ENCOUNTER — HOME CARE VISIT (OUTPATIENT)
Dept: HOSPICE | Facility: HOSPICE | Age: 81
End: 2021-04-15
Payer: MEDICARE

## 2021-04-15 PROCEDURE — 0651 HSPC ROUTINE HOME CARE

## 2021-04-16 ENCOUNTER — HOME CARE VISIT (OUTPATIENT)
Dept: HOSPICE | Facility: HOSPICE | Age: 81
End: 2021-04-16
Payer: MEDICARE

## 2021-04-16 PROCEDURE — 0651 HSPC ROUTINE HOME CARE

## 2021-04-17 PROCEDURE — 0651 HSPC ROUTINE HOME CARE

## 2021-04-18 PROCEDURE — 0651 HSPC ROUTINE HOME CARE

## 2021-04-19 ENCOUNTER — HOME CARE VISIT (OUTPATIENT)
Dept: HOSPICE | Facility: HOSPICE | Age: 81
End: 2021-04-19
Payer: MEDICARE

## 2021-04-19 PROCEDURE — 0651 HSPC ROUTINE HOME CARE

## 2021-04-20 ENCOUNTER — HOME CARE VISIT (OUTPATIENT)
Dept: SCHEDULING | Facility: HOME HEALTH | Age: 81
End: 2021-04-20
Payer: MEDICARE

## 2021-04-20 VITALS
SYSTOLIC BLOOD PRESSURE: 144 MMHG | HEART RATE: 79 BPM | RESPIRATION RATE: 18 BRPM | DIASTOLIC BLOOD PRESSURE: 70 MMHG | TEMPERATURE: 99.2 F

## 2021-04-20 PROCEDURE — 0651 HSPC ROUTINE HOME CARE

## 2021-04-20 PROCEDURE — HOSPICE MEDICATION HC HH HOSPICE MEDICATION

## 2021-04-20 PROCEDURE — G0299 HHS/HOSPICE OF RN EA 15 MIN: HCPCS

## 2021-04-21 ENCOUNTER — HOME CARE VISIT (OUTPATIENT)
Dept: HOSPICE | Facility: HOSPICE | Age: 81
End: 2021-04-21
Payer: MEDICARE

## 2021-04-21 PROCEDURE — 0651 HSPC ROUTINE HOME CARE

## 2021-04-22 PROCEDURE — 0651 HSPC ROUTINE HOME CARE

## 2021-04-23 ENCOUNTER — HOME CARE VISIT (OUTPATIENT)
Dept: SCHEDULING | Facility: HOME HEALTH | Age: 81
End: 2021-04-23
Payer: MEDICARE

## 2021-04-23 PROCEDURE — G0155 HHCP-SVS OF CSW,EA 15 MIN: HCPCS

## 2021-04-23 PROCEDURE — 0651 HSPC ROUTINE HOME CARE

## 2021-04-24 PROCEDURE — 0651 HSPC ROUTINE HOME CARE

## 2021-04-25 PROCEDURE — 0651 HSPC ROUTINE HOME CARE

## 2021-04-26 ENCOUNTER — HOME CARE VISIT (OUTPATIENT)
Dept: HOSPICE | Facility: HOSPICE | Age: 81
End: 2021-04-26
Payer: MEDICARE

## 2021-04-26 PROCEDURE — 0651 HSPC ROUTINE HOME CARE

## 2021-04-27 ENCOUNTER — HOME CARE VISIT (OUTPATIENT)
Dept: SCHEDULING | Facility: HOME HEALTH | Age: 81
End: 2021-04-27
Payer: MEDICARE

## 2021-04-27 ENCOUNTER — HOME CARE VISIT (OUTPATIENT)
Dept: HOSPICE | Facility: HOSPICE | Age: 81
End: 2021-04-27
Payer: MEDICARE

## 2021-04-27 VITALS — DIASTOLIC BLOOD PRESSURE: 78 MMHG | TEMPERATURE: 97.8 F | SYSTOLIC BLOOD PRESSURE: 132 MMHG | RESPIRATION RATE: 18 BRPM

## 2021-04-27 PROCEDURE — G0299 HHS/HOSPICE OF RN EA 15 MIN: HCPCS

## 2021-04-27 PROCEDURE — 0651 HSPC ROUTINE HOME CARE

## 2021-04-28 ENCOUNTER — HOME CARE VISIT (OUTPATIENT)
Dept: HOSPICE | Facility: HOSPICE | Age: 81
End: 2021-04-28
Payer: MEDICARE

## 2021-04-28 VITALS — HEART RATE: 84 BPM | DIASTOLIC BLOOD PRESSURE: 62 MMHG | SYSTOLIC BLOOD PRESSURE: 128 MMHG | RESPIRATION RATE: 16 BRPM

## 2021-04-28 PROCEDURE — G0299 HHS/HOSPICE OF RN EA 15 MIN: HCPCS

## 2021-04-28 PROCEDURE — 0651 HSPC ROUTINE HOME CARE

## 2021-04-29 PROCEDURE — 0651 HSPC ROUTINE HOME CARE

## 2021-04-30 PROCEDURE — 0651 HSPC ROUTINE HOME CARE

## 2021-05-01 PROCEDURE — 0651 HSPC ROUTINE HOME CARE

## 2021-05-02 PROCEDURE — 0651 HSPC ROUTINE HOME CARE

## 2021-05-03 PROCEDURE — 0651 HSPC ROUTINE HOME CARE

## 2021-05-04 ENCOUNTER — HOME CARE VISIT (OUTPATIENT)
Dept: SCHEDULING | Facility: HOME HEALTH | Age: 81
End: 2021-05-04
Payer: MEDICARE

## 2021-05-04 VITALS — RESPIRATION RATE: 18 BRPM | SYSTOLIC BLOOD PRESSURE: 122 MMHG | DIASTOLIC BLOOD PRESSURE: 74 MMHG | HEART RATE: 68 BPM

## 2021-05-04 PROCEDURE — HOSPICE MEDICATION HC HH HOSPICE MEDICATION

## 2021-05-04 PROCEDURE — G0299 HHS/HOSPICE OF RN EA 15 MIN: HCPCS

## 2021-05-04 PROCEDURE — 0651 HSPC ROUTINE HOME CARE

## 2021-05-05 PROCEDURE — 0651 HSPC ROUTINE HOME CARE

## 2021-05-06 PROCEDURE — 0651 HSPC ROUTINE HOME CARE

## 2021-05-07 PROCEDURE — 0651 HSPC ROUTINE HOME CARE

## 2021-05-08 PROCEDURE — 0651 HSPC ROUTINE HOME CARE

## 2021-05-09 PROCEDURE — 0651 HSPC ROUTINE HOME CARE

## 2021-05-10 PROCEDURE — 0651 HSPC ROUTINE HOME CARE

## 2021-05-11 ENCOUNTER — HOME CARE VISIT (OUTPATIENT)
Dept: SCHEDULING | Facility: HOME HEALTH | Age: 81
End: 2021-05-11
Payer: MEDICARE

## 2021-05-11 PROCEDURE — 0651 HSPC ROUTINE HOME CARE

## 2021-05-11 PROCEDURE — G0299 HHS/HOSPICE OF RN EA 15 MIN: HCPCS

## 2021-05-12 ENCOUNTER — HOME CARE VISIT (OUTPATIENT)
Dept: HOSPICE | Facility: HOSPICE | Age: 81
End: 2021-05-12
Payer: MEDICARE

## 2021-05-12 VITALS
RESPIRATION RATE: 20 BRPM | SYSTOLIC BLOOD PRESSURE: 140 MMHG | DIASTOLIC BLOOD PRESSURE: 70 MMHG | TEMPERATURE: 98 F | HEART RATE: 90 BPM

## 2021-05-12 PROCEDURE — HOSPICE MEDICATION HC HH HOSPICE MEDICATION

## 2021-05-12 PROCEDURE — 0651 HSPC ROUTINE HOME CARE

## 2021-05-13 PROCEDURE — 0651 HSPC ROUTINE HOME CARE

## 2021-05-14 ENCOUNTER — HOME CARE VISIT (OUTPATIENT)
Dept: SCHEDULING | Facility: HOME HEALTH | Age: 81
End: 2021-05-14
Payer: MEDICARE

## 2021-05-14 ENCOUNTER — HOME CARE VISIT (OUTPATIENT)
Dept: HOSPICE | Facility: HOSPICE | Age: 81
End: 2021-05-14
Payer: MEDICARE

## 2021-05-14 PROCEDURE — G0155 HHCP-SVS OF CSW,EA 15 MIN: HCPCS

## 2021-05-14 PROCEDURE — 0651 HSPC ROUTINE HOME CARE

## 2021-05-14 PROCEDURE — HOSPICE MEDICATION HC HH HOSPICE MEDICATION

## 2021-05-14 PROCEDURE — G0299 HHS/HOSPICE OF RN EA 15 MIN: HCPCS

## 2021-05-15 VITALS — HEART RATE: 78 BPM | DIASTOLIC BLOOD PRESSURE: 72 MMHG | SYSTOLIC BLOOD PRESSURE: 138 MMHG | RESPIRATION RATE: 18 BRPM

## 2021-05-15 PROCEDURE — 0651 HSPC ROUTINE HOME CARE

## 2021-05-16 PROCEDURE — 0651 HSPC ROUTINE HOME CARE

## 2021-05-17 ENCOUNTER — HOME CARE VISIT (OUTPATIENT)
Dept: HOSPICE | Facility: HOSPICE | Age: 81
End: 2021-05-17
Payer: MEDICARE

## 2021-05-17 VITALS — HEART RATE: 83 BPM | SYSTOLIC BLOOD PRESSURE: 124 MMHG | DIASTOLIC BLOOD PRESSURE: 68 MMHG | RESPIRATION RATE: 20 BRPM

## 2021-05-17 PROCEDURE — 0651 HSPC ROUTINE HOME CARE

## 2021-05-17 PROCEDURE — G0299 HHS/HOSPICE OF RN EA 15 MIN: HCPCS

## 2021-05-18 PROCEDURE — 0651 HSPC ROUTINE HOME CARE

## 2021-05-19 ENCOUNTER — HOME CARE VISIT (OUTPATIENT)
Dept: HOSPICE | Facility: HOSPICE | Age: 81
End: 2021-05-19
Payer: MEDICARE

## 2021-05-19 PROCEDURE — 0651 HSPC ROUTINE HOME CARE

## 2021-05-20 PROCEDURE — 0651 HSPC ROUTINE HOME CARE

## 2021-05-21 PROCEDURE — 0651 HSPC ROUTINE HOME CARE

## 2021-05-22 PROCEDURE — 0651 HSPC ROUTINE HOME CARE

## 2021-05-23 PROCEDURE — 0651 HSPC ROUTINE HOME CARE

## 2021-05-24 PROCEDURE — 0651 HSPC ROUTINE HOME CARE

## 2021-05-25 ENCOUNTER — HOME CARE VISIT (OUTPATIENT)
Dept: SCHEDULING | Facility: HOME HEALTH | Age: 81
End: 2021-05-25
Payer: MEDICARE

## 2021-05-25 VITALS
DIASTOLIC BLOOD PRESSURE: 80 MMHG | OXYGEN SATURATION: 88 % | SYSTOLIC BLOOD PRESSURE: 166 MMHG | TEMPERATURE: 97.5 F | HEART RATE: 90 BPM | RESPIRATION RATE: 18 BRPM

## 2021-05-25 PROCEDURE — HOSPICE MEDICATION HC HH HOSPICE MEDICATION

## 2021-05-25 PROCEDURE — G0299 HHS/HOSPICE OF RN EA 15 MIN: HCPCS

## 2021-05-25 PROCEDURE — 0651 HSPC ROUTINE HOME CARE

## 2021-05-26 ENCOUNTER — HOME CARE VISIT (OUTPATIENT)
Dept: HOSPICE | Facility: HOSPICE | Age: 81
End: 2021-05-26
Payer: MEDICARE

## 2021-05-26 PROCEDURE — 0651 HSPC ROUTINE HOME CARE

## 2021-05-27 ENCOUNTER — HOSPITAL ENCOUNTER (INPATIENT)
Age: 81
LOS: 5 days | Discharge: HOSPICE | End: 2021-06-01
Attending: INTERNAL MEDICINE | Admitting: INTERNAL MEDICINE
Payer: MEDICARE

## 2021-05-27 PROCEDURE — 0655 HSPC INPATIENT RESPITE

## 2021-05-27 PROCEDURE — G0299 HHS/HOSPICE OF RN EA 15 MIN: HCPCS

## 2021-05-27 PROCEDURE — 74011250637 HC RX REV CODE- 250/637: Performed by: INTERNAL MEDICINE

## 2021-05-27 RX ORDER — SERTRALINE HYDROCHLORIDE 100 MG/1
100 TABLET, FILM COATED ORAL
Status: DISCONTINUED | OUTPATIENT
Start: 2021-05-27 | End: 2021-06-01 | Stop reason: HOSPADM

## 2021-05-27 RX ORDER — MORPHINE SULFATE 20 MG/ML
5 SOLUTION ORAL
Status: DISCONTINUED | OUTPATIENT
Start: 2021-05-27 | End: 2021-06-01 | Stop reason: HOSPADM

## 2021-05-27 RX ORDER — HYOSCYAMINE SULFATE 0.12 MG/1
0.12 TABLET SUBLINGUAL
Status: DISCONTINUED | OUTPATIENT
Start: 2021-05-27 | End: 2021-06-01 | Stop reason: HOSPADM

## 2021-05-27 RX ORDER — QUETIAPINE FUMARATE 25 MG/1
12.5 TABLET, FILM COATED ORAL
Status: DISCONTINUED | OUTPATIENT
Start: 2021-05-27 | End: 2021-06-01 | Stop reason: HOSPADM

## 2021-05-27 RX ORDER — SENNOSIDES 8.6 MG/1
2 TABLET ORAL DAILY
Status: DISCONTINUED | OUTPATIENT
Start: 2021-05-27 | End: 2021-05-28

## 2021-05-27 RX ORDER — FACIAL-BODY WIPES
10 EACH TOPICAL DAILY PRN
Status: DISCONTINUED | OUTPATIENT
Start: 2021-05-27 | End: 2021-06-01 | Stop reason: HOSPADM

## 2021-05-27 RX ORDER — NAPROXEN SODIUM 220 MG
220 TABLET ORAL
Status: DISCONTINUED | OUTPATIENT
Start: 2021-05-27 | End: 2021-05-27

## 2021-05-27 RX ORDER — PROCHLORPERAZINE MALEATE 5 MG
10 TABLET ORAL
Status: DISCONTINUED | OUTPATIENT
Start: 2021-05-27 | End: 2021-06-01 | Stop reason: HOSPADM

## 2021-05-27 RX ORDER — LORAZEPAM 2 MG/ML
0.5 CONCENTRATE ORAL
Status: DISCONTINUED | OUTPATIENT
Start: 2021-05-27 | End: 2021-06-01 | Stop reason: HOSPADM

## 2021-05-27 RX ORDER — HALOPERIDOL 2 MG/ML
1 SOLUTION ORAL
Status: DISCONTINUED | OUTPATIENT
Start: 2021-05-27 | End: 2021-06-01 | Stop reason: HOSPADM

## 2021-05-27 RX ORDER — ACETAMINOPHEN 650 MG/1
650 SUPPOSITORY RECTAL
Status: DISCONTINUED | OUTPATIENT
Start: 2021-05-27 | End: 2021-06-01 | Stop reason: HOSPADM

## 2021-05-27 RX ORDER — SENNOSIDES 8.6 MG/1
2 TABLET ORAL DAILY
Status: DISCONTINUED | OUTPATIENT
Start: 2021-05-27 | End: 2021-05-27

## 2021-05-27 RX ORDER — NAPROXEN SODIUM 220 MG
220 TABLET ORAL
Status: DISCONTINUED | OUTPATIENT
Start: 2021-05-27 | End: 2021-06-01 | Stop reason: HOSPADM

## 2021-05-27 RX ORDER — LOSARTAN POTASSIUM 50 MG/1
50 TABLET ORAL DAILY
Status: DISCONTINUED | OUTPATIENT
Start: 2021-05-27 | End: 2021-06-01 | Stop reason: HOSPADM

## 2021-05-27 RX ADMIN — QUETIAPINE FUMARATE 12.5 MG: 25 TABLET ORAL at 21:00

## 2021-05-27 RX ADMIN — SERTRALINE HYDROCHLORIDE 100 MG: 100 TABLET, FILM COATED ORAL at 20:47

## 2021-05-27 NOTE — H&P
[facility] Help to Those in Need  [phone]    Patient Name: Linda Dowling  YOB: 1940    Date of Provider Hospice Visit: 05/27/21    Level of Care:   [] General Inpatient (GIP)    [] Routine   [x] Respite    Current Location of Care:  [] [facility] [] [facility] [] [address] [] [facility] [x] Hospice House [facility]      Principle Hospice Diagnosis:      Alzheimer's dementia with behavioral disturbance, unspecified timing of dementia onset ([facility] [address] (G30.9, F02.81)  Other Hospice diagnoses:     Weight loss (R63.4)     Multiple falls (R29.6)     Essential hypertension (I10)     LBBB (left bundle branch block) (I44.7)     Malignant neoplasm of left female breast, unspecified estrogen receptor status, unspecified site of breast (HCC) (C50.912)     Skin cancer (C44.90)     NPH (normal pressure hydrocephalus) ([facility] [address] (G91.2)     Family history of skin cancer (Z80.8)     Encounter for hospice care (Z51.5)       HOSPICE SUMMARY     Chart Reviewed for patient's medical history and hospice care plan. Hospice Physician Certification/Recertification Narrative per :    Patient has been admitted to hospice care second Alzheimer's dementia. During initial evaluation, patient with a PPS score of 40, fast score of 7B to C. She also has a history of a left bundle branch block, left-sided breast cancer. In addition, patient with a history of normal pressure hydrocephalus. Patient has recently moved in with her daughter, showing evidence of further decline with multiple falls. She is not willing to participate in any type of therapy. She has lost approximately 30 pounds over the last 6 months. She is showing some evidence of further decline with argumentative/restlessness. She is not willing to bathe. Patient's daughter has hired caregivers for the home. Patient spending the majority of her time in a recliner. She also shows evidence of recurrent urinary tract infections.   Given these multiple comorbidities related to her Alzheimer's dementia, patient has been admitted to hospice care     The patient is not likely to endanger self or others. Patient being admitted for Respite care x 5 days for   [x]  Caregiver exhaustion and needing break  []  Caregiver unavailable       PLAN   1. Patient's home medications were reviewed and reconciled. Continue with current home medications and plan of care as outlined in chart. 2.  and SW to support family needs. 3. Disposition: Home with hospice once respite stay is completed.

## 2021-05-27 NOTE — PROGRESS NOTES
Problem: Pressure Injury - Risk of  Goal: *Prevention of pressure injury  Description: Document Bigg Scale and appropriate interventions in the flowsheet. Outcome: Progressing Towards Goal  Note: Pressure Injury Interventions:  Sensory Interventions: Assess changes in LOC, Pressure redistribution bed/mattress (bed type), Minimize linen layers, Keep linens dry and wrinkle-free    Moisture Interventions: Apply protective barrier, creams and emollients, Absorbent underpads    Activity Interventions: Pressure redistribution bed/mattress(bed type)    Mobility Interventions: Float heels, Pressure redistribution bed/mattress (bed type), HOB 30 degrees or less    Nutrition Interventions: Offer support with meals,snacks and hydration, Document food/fluid/supplement intake    Friction and Shear Interventions: Apply protective barrier, creams and emollients, Lift sheet, Minimize layers                Problem: Falls - Risk of  Goal: *Absence of Falls  Description: Document Anya Fall Risk and appropriate interventions in the flowsheet.   Outcome: Progressing Towards Goal  Note: Fall Risk Interventions:  Mobility Interventions: Bed/chair exit alarm, Communicate number of staff needed for ambulation/transfer, Patient to call before getting OOB, Utilize walker, cane, or other assistive device    Mentation Interventions: Adequate sleep, hydration, pain control, Bed/chair exit alarm, Door open when patient unattended, More frequent rounding, Reorient patient         Elimination Interventions: Bed/chair exit alarm, Call light in reach, Patient to call for help with toileting needs    History of Falls Interventions: Bed/chair exit alarm, Door open when patient unattended, Room close to nurse's station

## 2021-05-27 NOTE — PROGRESS NOTES
1050-Patient arrived to [facility] via ambulance transport; assessment completed and patient assisted into recliner; pt oriented to [facility]; pt without pain or discomfort; juice provide; pt content sitting in recliner watching TV; bed alarm set up and activated  1225- rounding at the bedside with Dr. Ian Vázquez; no changes to POC: pt eating lunch  1330-pt ate 100% of lunch; assisted to walk to the bathroom to void--depends wet change changed; pt taken back to the recliner chair  1525-pt asleep in the recliner  1615-pt assisted to the bathroom to void  1710-pt sitting up in the recliner eating dinner--ate 100%  1900-pt assisted to the bathroom by GARY Starks [address], then returned to bed  1900-Report given to [facility] PATIENT:  [address]:  respite    REASON FOR GIP:   n/a    *PATIENT REMAINS ELIGIBLE FOR [facility] LEVEL OF CARE AS EVIDENCED BY: (MUST BE ADDRESSED OF PATIENT GIP)      REASON FOR RESPITE:  Caregiver going out of town    O2 SAFETY:  n/a    FALL INTERVENTIONS PROVIDED:   Implemented/recommended use of non-skid footwear, Implemented/recommended assistive devices and encouraged their use, Implemented/recommended resources for alarm system (personal alarm, bed alarm, call bell, etc.) , Implemented/recommended environmental changes (remove hazards, lower bed, improve lighting, etc.) and Implemented/recommended increased supervision/assistance    INTERDISPLINARY COMMUNICATION/COLLABORATION:  Physician, MSW, [place] and RN, CNA    NEW MEDICATION INITIATION DOCUMENTATION:  n/a    Reason medication is being initiated:  n/a    MD / Provider name consulted re: change in status / initiation of new medication:  n/a    New Symptom(s):  n/a    New Order(s):  n/a    Name of the person notified of the changes:  n/a    Name of person being taught:  n/a    Instructions given:  n/a    Side Effects taught:  n/a    Response to teaching:  n/a      COMFORTABLE DYING MEASURE:  Is Patient/family satisfied with symptom level?  yes    DISCHARGE PLAN:  Home with daughter following end of respite stay

## 2021-05-28 ENCOUNTER — HOME CARE VISIT (OUTPATIENT)
Dept: HOSPICE | Facility: HOSPICE | Age: 81
End: 2021-05-28
Payer: MEDICARE

## 2021-05-28 PROCEDURE — 74011000250 HC RX REV CODE- 250: Performed by: INTERNAL MEDICINE

## 2021-05-28 PROCEDURE — G0300 HHS/HOSPICE OF LPN EA 15 MIN: HCPCS

## 2021-05-28 PROCEDURE — 74011250637 HC RX REV CODE- 250/637: Performed by: INTERNAL MEDICINE

## 2021-05-28 PROCEDURE — 74011250637 HC RX REV CODE- 250/637: Performed by: FAMILY MEDICINE

## 2021-05-28 PROCEDURE — 0655 HSPC INPATIENT RESPITE

## 2021-05-28 PROCEDURE — G0299 HHS/HOSPICE OF RN EA 15 MIN: HCPCS

## 2021-05-28 RX ORDER — SENNOSIDES 8.6 MG/1
2 TABLET ORAL DAILY
Status: DISCONTINUED | OUTPATIENT
Start: 2021-05-28 | End: 2021-05-28

## 2021-05-28 RX ORDER — SENNOSIDES 8.8 MG/5ML
17.2 LIQUID ORAL DAILY
Status: DISCONTINUED | OUTPATIENT
Start: 2021-05-28 | End: 2021-06-01 | Stop reason: HOSPADM

## 2021-05-28 RX ADMIN — Medication 1 CAPSULE: at 10:14

## 2021-05-28 RX ADMIN — QUETIAPINE FUMARATE 12.5 MG: 25 TABLET ORAL at 21:00

## 2021-05-28 RX ADMIN — SENNOSIDES 17.2 MG: 8.8 LIQUID ORAL at 10:15

## 2021-05-28 RX ADMIN — LOSARTAN POTASSIUM 50 MG: 50 TABLET, FILM COATED ORAL at 10:14

## 2021-05-28 RX ADMIN — SERTRALINE HYDROCHLORIDE 100 MG: 100 TABLET, FILM COATED ORAL at 20:43

## 2021-05-28 RX ADMIN — SERTRALINE HYDROCHLORIDE 100 MG: 100 TABLET, FILM COATED ORAL at 10:13

## 2021-05-28 NOTE — PROGRESS NOTES
.  1200:Report received from [address] [place] and [address]. Pt sitting up in recliner eating lunch no complaints, pt is pleasantly confused denies pain no discomfort noted. 1240:Pt ate 100% of her meal, continues to rest in recliner. 14:00:Assisted pt to bathroom, pt clothes was wet partial bath given, tolerated well.  16:15:Pt assisted to bathroom, voids meño care done. 17:45:Pt assisted to bathroom sit with her educated on safety and the use of her call bell, pt remembers just for a second. 18:26:Pt is slightly agitated easily redirected. 1900:Report given to [facility]. NAME OF PATIENT: Carlitos Barros     LEVEL OF CARE: Respite  REASON FOR GIP: N/A     *PATIENT REMAINS ELIGIBLE FOR GIP LEVEL OF CARE AS EVIDENCED BY: n/a    REASON FOR RESPITE:  Caregiver breakdown     O2 SAFETY:  Concentrator positioning (6\" from furniture/drapes), Tanks stored in butler , No petroleum based products on face while oxygen in use and Oxygen sign on the door     FALL INTERVENTIONS PROVIDED:   Implemented/recommended use of fall risk identification flag to all team members, Implemented/recommended assistive devices and encouraged their use, Implemented/recommended resources for alarm system (personal alarm, bed alarm, call bell, etc.)  and Implemented/recommended environmental changes (remove hazards, lower bed, improve lighting, etc.)     INTERDISPLINARY COMMUNICATION/COLLABORATION:  Physician, MSW, [place] and RN, CNA     NEW MEDICATION INITIATION DOCUMENTATION:N/A      Reason medication is being initiated:  N/A     MD / Provider name consulted re: change in status / initiation of new medication:  N/A     New Symptom(s):  N/A     New Order(s):  N/A     Name of the person notified of the changes:  N/A     Name of person being taught:  N/A     Instructions given:  N/A     Side Effects taught:  N/A     Response to teaching:  N/A        COMFORTABLE DYING MEASURE:  Is Patient/family satisfied with symptom level? yes     DISCHARGE PLAN:Pt will complete respite stay and go home with family.

## 2021-05-28 NOTE — PROGRESS NOTES
1900 Report received from [place], [place]. Pt is Respite level of care for care giver breakdown. Dx Alzheimers  1930 Assisted to bathroom. Has been incontinent of urine already. Ambulates with rollator. Gait is unsteady. Needs assist of one.  2100 Bedtime medications taken without difficulty. 2115 Activated bed alarm getting out of bed to bathroom. Assisted to bathroom but has already voided. Depends changed. Became SOB after returning to bed, with wheezing. HOB elevated to sitting position. 02 applied at 2L/M.  2125 Pt has calmed and breathing is regular and non labored. Encouraged to keep 02 on until she feels comfortable. 2230 Activated bed alarm getting up to bathroom. Assisted to bathroom. Voided. Returned to bed. No dyspnea noted. 0100 Resting quietly in bed. Appears to be sleeping.  0300 Awake, watching TV. No complaints. 0400 Resting quietly, awake.   0600 Continues to rest quietly. No further complaints of SOB.  0700 Report to oncoming nurse.     NAME OF PATIENT:  Myles Lozoya    LEVEL OF CARE:  Respite    REASON FOR GIP:   na    *PATIENT REMAINS ELIGIBLE FOR [facility] LEVEL OF CARE AS EVIDENCED BY: na      REASON FOR RESPITE:  Caregiver breakdown    O2 SAFETY:  na    FALL INTERVENTIONS PROVIDED:   Implemented/recommended use of non-skid footwear, Implemented/recommended use of fall risk identification flag to all team members, Implemented/recommended assistive devices and encouraged their use, Implemented/recommended resources for alarm system (personal alarm, bed alarm, call bell, etc.) , Implemented/recommended environmental changes (remove hazards, lower bed, improve lighting, etc.) and Implemented/recommended increased supervision/assistance    INTERDISPLINARY COMMUNICATION/COLLABORATION:  Physician, MSW, Julien and RN, CNA    NEW MEDICATION INITIATION DOCUMENTATION:  na    Reason medication is being initiated:  na    MD / Provider name consulted re: change in status / initiation of new medication: na    New Symptom(s):  na    New Order(s):  na    Name of the person notified of the changes:  na    Name of person being taught:  na    Instructions given:  na    Side Effects taught:  na    Response to teaching:  na      COMFORTABLE DYING MEASURE:  Is Patient/family satisfied with symptom level? Yes    DISCHARGE PLAN:   Return home with care of family when Respite stay is complete.

## 2021-05-28 NOTE — PROGRESS NOTES
Problem: Pressure Injury - Risk of  Goal: *Prevention of pressure injury  Description: Document Bigg Scale and appropriate interventions in the flowsheet. Outcome: Progressing Towards Goal  Note: Pressure Injury Interventions:  Sensory Interventions: Float heels, Minimize linen layers, Keep linens dry and wrinkle-free, Pressure redistribution bed/mattress (bed type)    Moisture Interventions: Absorbent underpads, Maintain skin hydration (lotion/cream), Apply protective barrier, creams and emollients    Activity Interventions: Pressure redistribution bed/mattress(bed type)    Mobility Interventions: Float heels, Pressure redistribution bed/mattress (bed type), HOB 30 degrees or less    Nutrition Interventions: Document food/fluid/supplement intake, Offer support with meals,snacks and hydration    Friction and Shear Interventions: Apply protective barrier, creams and emollients, Minimize layers, Lift sheet, Foam dressings/transparent film/skin sealants                Problem: Falls - Risk of  Goal: *Absence of Falls  Description: Document Anya Fall Risk and appropriate interventions in the flowsheet.   Outcome: Progressing Towards Goal  Note: Fall Risk Interventions:  Mobility Interventions: Bed/chair exit alarm, Patient to call before getting OOB, Utilize walker, cane, or other assistive device    Mentation Interventions: Adequate sleep, hydration, pain control, Bed/chair exit alarm, Door open when patient unattended, More frequent rounding    Medication Interventions: Patient to call before getting OOB, Bed/chair exit alarm    Elimination Interventions: Bed/chair exit alarm, Patient to call for help with toileting needs, Toileting schedule/hourly rounds    History of Falls Interventions: Bed/chair exit alarm, Door open when patient unattended

## 2021-05-28 NOTE — PROGRESS NOTES
Hospice attending    Chart reviewed and patient examined. Patient currently on service secondary to Alzheimer's dementia. Patient is sitting in the bedside chair, alert to person. Patient is up ambulating and actually able to feed herself. Does have some incontinence but no recent infections. I will need to have further discussion with our home sanjeev team about recertification of patient as she does not appear to meet criteria for end-stage Alzheimer's dementia. We will see how the respite stay goes but may need to have further discussions next week.

## 2021-05-28 NOTE — PROGRESS NOTES
0700-Report received from [address] assisted to the bathroom to void with depends changed by GARY Arshad due to incontinence; pt sat in recliner   0830-Patient sitting up in recliner for breakfast; assessment completed; pt denies pain, SOB pt alert and cooperative  0943-Rounding at the bedside with Dr. Prosper Baeza; no changes to  POC  1020-scheduled medications administered  1130-pt assisted to the bathroom to void, then returned to the chair for lunch  1200-Report given to [address] PATIENT:  [address]:  respite    REASON FOR GIP:   n/a    *PATIENT REMAINS ELIGIBLE FOR [facility] LEVEL OF CARE AS EVIDENCED BY: (MUST BE ADDRESSED OF PATIENT GIP)      REASON FOR RESPITE:  Caregiver breakdown    O2 SAFETY:  n/a    FALL INTERVENTIONS PROVIDED:   Implemented/recommended use of non-skid footwear, Implemented/recommended assistive devices and encouraged their use, Implemented/recommended resources for alarm system (personal alarm, bed alarm, call bell, etc.) , Implemented/recommended environmental changes (remove hazards, lower bed, improve lighting, etc.) and Implemented/recommended increased supervision/assistance    INTERDISPLINARY COMMUNICATION/COLLABORATION:  Physician, MSW, [place]ien and RN, CNA    NEW MEDICATION INITIATION DOCUMENTATION:  n/a    Reason medication is being initiated:  n/a    MD / Provider name consulted re: change in status / initiation of new medication:  n/a    New Symptom(s):  n/a    New Order(s):  n/a    Name of the person notified of the changes:  n/a    Name of person being taught:  n/a    Instructions given:  n/a    Side Effects taught:  n/a    Response to teaching:  n/a      COMFORTABLE DYING MEASURE:  Is Patient/family satisfied with symptom level?  yes    DISCHARGE PLAN:  Home following respite stay

## 2021-05-28 NOTE — PROGRESS NOTES
Problem: Falls - Risk of  Goal: *Absence of Falls  Description: Document Hugh Mo Fall Risk and appropriate interventions in the flowsheet.   Outcome: Progressing Towards Goal  Note: Fall Risk Interventions:  Mobility Interventions: Bed/chair exit alarm, Communicate number of staff needed for ambulation/transfer, Patient to call before getting OOB, Utilize walker, cane, or other assistive device    Mentation Interventions: Adequate sleep, hydration, pain control, Bed/chair exit alarm, Door open when patient unattended, More frequent rounding, Reorient patient         Elimination Interventions: Bed/chair exit alarm, Call light in reach, Patient to call for help with toileting needs    History of Falls Interventions: Bed/chair exit alarm, Door open when patient unattended, Room close to nurse's station

## 2021-05-29 PROCEDURE — G0299 HHS/HOSPICE OF RN EA 15 MIN: HCPCS

## 2021-05-29 PROCEDURE — 74011250637 HC RX REV CODE- 250/637: Performed by: INTERNAL MEDICINE

## 2021-05-29 PROCEDURE — 74011250637 HC RX REV CODE- 250/637: Performed by: FAMILY MEDICINE

## 2021-05-29 PROCEDURE — 74011000250 HC RX REV CODE- 250: Performed by: INTERNAL MEDICINE

## 2021-05-29 PROCEDURE — 0655 HSPC INPATIENT RESPITE

## 2021-05-29 RX ADMIN — Medication 1 CAPSULE: at 08:43

## 2021-05-29 RX ADMIN — SENNOSIDES 17.2 MG: 8.8 LIQUID ORAL at 08:43

## 2021-05-29 RX ADMIN — LOSARTAN POTASSIUM 50 MG: 50 TABLET, FILM COATED ORAL at 08:42

## 2021-05-29 RX ADMIN — SERTRALINE HYDROCHLORIDE 100 MG: 100 TABLET, FILM COATED ORAL at 21:00

## 2021-05-29 RX ADMIN — QUETIAPINE FUMARATE 12.5 MG: 25 TABLET ORAL at 21:00

## 2021-05-29 NOTE — PROGRESS NOTES
Problem: Pressure Injury - Risk of  Goal: *Prevention of pressure injury  Description: Document Bigg Scale and appropriate interventions in the flowsheet. Outcome: Progressing Towards Goal  Note: Pressure Injury Interventions:  Sensory Interventions: Assess changes in LOC    Moisture Interventions: Check for incontinence Q2 hours and as needed, Moisture barrier, Apply protective barrier, creams and emollients, Minimize layers    Activity Interventions: Pressure redistribution bed/mattress(bed type)    Mobility Interventions: Float heels, Turn and reposition approx. every two hours(pillow and wedges), Pressure redistribution bed/mattress (bed type)    Nutrition Interventions: Document food/fluid/supplement intake    Friction and Shear Interventions: Apply protective barrier, creams and emollients                Problem: Patient Education: Go to Patient Education Activity  Goal: Patient/Family Education  Outcome: Progressing Towards Goal     Problem: Falls - Risk of  Goal: *Absence of Falls  Description: Document Anya Fall Risk and appropriate interventions in the flowsheet.   Outcome: Progressing Towards Goal  Note: Fall Risk Interventions:  Mobility Interventions: Bed/chair exit alarm    Mentation Interventions: Adequate sleep, hydration, pain control    Medication Interventions: Patient to call before getting OOB    Elimination Interventions: Bed/chair exit alarm, Call light in reach, Toileting schedule/hourly rounds    History of Falls Interventions: Bed/chair exit alarm, Door open when patient unattended

## 2021-05-29 NOTE — PROGRESS NOTES
Problem: Pressure Injury - Risk of  Goal: *Prevention of pressure injury  Description: Document Bigg Scale and appropriate interventions in the flowsheet. Outcome: Progressing Towards Goal  Note: Pressure Injury Interventions:  Sensory Interventions: Assess changes in LOC    Moisture Interventions: Absorbent underpads    Activity Interventions: Pressure redistribution bed/mattress(bed type)    Mobility Interventions: Float heels, Pressure redistribution bed/mattress (bed type)    Nutrition Interventions: Document food/fluid/supplement intake    Friction and Shear Interventions: Apply protective barrier, creams and emollients                Problem: Patient Education: Go to Patient Education Activity  Goal: Patient/Family Education  Outcome: Progressing Towards Goal     Problem: Falls - Risk of  Goal: *Absence of Falls  Description: Document Anya Fall Risk and appropriate interventions in the flowsheet.   Outcome: Progressing Towards Goal  Note: Fall Risk Interventions:  Mobility Interventions: Bed/chair exit alarm    Mentation Interventions: Adequate sleep, hydration, pain control    Medication Interventions: Patient to call before getting OOB    Elimination Interventions: Bed/chair exit alarm    History of Falls Interventions: Bed/chair exit alarm, Door open when patient unattended         Problem: Patient Education: Go to Patient Education Activity  Goal: Patient/Family Education  Outcome: Progressing Towards Goal

## 2021-05-29 NOTE — HOSPICE
NAME OF PATIENT:  Johnnie Oswald    LEVEL OF CARE:  respite    REASON FOR GIP: n/a      *PATIENT REMAINS ELIGIBLE FOR [facility] LEVEL OF CARE AS EVIDENCED BY: (MUST BE ADDRESSED OF PATIENT GIP)      REASON FOR RESPITE:  Caregiver breakdown    O2 SAFETY: n/a      FALL INTERVENTIONS PROVIDED:   Implemented/recommended use of non-skid footwear, Implemented/recommended use of fall risk identification flag to all team members, Implemented/recommended assistive devices and encouraged their use, Implemented/recommended resources for alarm system (personal alarm, bed alarm, call bell, etc.) , Implemented/recommended environmental changes (remove hazards, lower bed, improve lighting, etc.) and Implemented/recommended increased supervision/assistance    INTERDISPLINARY COMMUNICATION/COLLABORATION:  RNGARY    NEW MEDICATION INITIATION DOCUMENTATION: N/A      Reason medication is being initiated:    N/A    MD / Provider name consulted re: change in status / initiation of new medication:   N/A    New Symptom(s):   N/A    New Order(s):   N/A    Name of the person notified of the changes:   N/A    Name of person being taught:   N/A    Instructions given:   N/A    Side Effects taught:   N/A    Response to teaching:   N/A      COMFORTABLE DYING MEASURE:  Is Patient/family satisfied with symptom level?  yes    DISCHARGE PLAN:  Patient may pass at [facility], should patient stabilize will return home with family      0700  Report received from [address]  Patient sitting up in bed eating breakfast.  Denies any complaint of pain. Tolerated morning medications well. Patient remains confused/ forgetful. Bed alarm engaged. 1045  Patient resting quietly with eyes closed. 1230 Patient up to chair to eat breakfast.  Chair alarm applied. Patient incontinent large amount of urine in brief. 1400  Bath given, patient remains sitting up in chair watching television.   1600  Patient setting off chair alarm frequently, call bell with in reach. 1745  Patient sitting in chair eating dinner. 1900  Report given to on coming nurse.

## 2021-05-29 NOTE — HOSPICE
1900 Received report from [facility] Patient is sitting in chair watching TV, very pleasant. 2000 Patient gets up without calling for nurse first. Alarm goes off and patient asks what the noise if for, explain that she needs to call before getting up. 2100 Gave Seroquel dose of 12.5 mg, and Zoloft dose of 100 mg.  2300 Patient is in bed resting.  0100 Patient sleeping in bed, turned on right side. 0400 Patient sleeping in bed , turned on left side. 7258 Patient sleeping in supine position in bed.  0700 Gave report.         NAME OF PATIENT:  Marcela Liao    LEVEL OF CARE:  Respite    REASON FOR GIP: NA      *PATIENT REMAINS ELIGIBLE FOR GIP LEVEL OF CARE AS EVIDENCED BY: (MUST BE ADDRESSED OF PATIENT GIP) NA      REASON FOR RESPITE:  Caregiver breakdown    O2 SAFETY:  NA    FALL INTERVENTIONS PROVIDED:   Implemented/recommended use of non-skid footwear, Implemented/recommended use of fall risk identification flag to all team members, Implemented/recommended assistive devices and encouraged their use, Implemented/recommended resources for alarm system (personal alarm, bed alarm, call bell, etc.) , Implemented/recommended environmental changes (remove hazards, lower bed, improve lighting, etc.) and Implemented/recommended increased supervision/assistance    INTERDISPLINARY COMMUNICATION/COLLABORATION:  Physician and RN, CNA    NEW MEDICATION INITIATION DOCUMENTATION:  NA    Reason medication is being initiated:  NA    MD / Provider name consulted re: change in status / initiation of new medication:  NA    New Symptom(s):  NA    New Order(s):  NA    Name of the person notified of the changes:  NA    Name of person being taught:  NA    Instructions given:  NA    Side Effects taught:  NA    Response to teaching:  NA      COMFORTABLE DYING MEASURE:  Is Patient/family satisfied with symptom level?  yes    DISCHARGE PLAN:  Home

## 2021-05-30 PROCEDURE — 74011250637 HC RX REV CODE- 250/637: Performed by: INTERNAL MEDICINE

## 2021-05-30 PROCEDURE — 74011250637 HC RX REV CODE- 250/637: Performed by: FAMILY MEDICINE

## 2021-05-30 PROCEDURE — 0655 HSPC INPATIENT RESPITE

## 2021-05-30 PROCEDURE — 74011000250 HC RX REV CODE- 250: Performed by: INTERNAL MEDICINE

## 2021-05-30 PROCEDURE — G0299 HHS/HOSPICE OF RN EA 15 MIN: HCPCS

## 2021-05-30 RX ADMIN — LOSARTAN POTASSIUM 50 MG: 50 TABLET, FILM COATED ORAL at 08:58

## 2021-05-30 RX ADMIN — QUETIAPINE FUMARATE 12.5 MG: 25 TABLET ORAL at 21:00

## 2021-05-30 RX ADMIN — SERTRALINE HYDROCHLORIDE 100 MG: 100 TABLET, FILM COATED ORAL at 21:00

## 2021-05-30 RX ADMIN — SENNOSIDES 17.2 MG: 8.8 LIQUID ORAL at 08:58

## 2021-05-30 RX ADMIN — Medication 1 CAPSULE: at 08:57

## 2021-05-30 NOTE — PROGRESS NOTES
Problem: Pressure Injury - Risk of  Goal: *Prevention of pressure injury  Description: Document Bigg Scale and appropriate interventions in the flowsheet. Outcome: Progressing Towards Goal  Note: Pressure Injury Interventions:  Sensory Interventions: Assess changes in LOC, Check visual cues for pain, Float heels, Pressure redistribution bed/mattress (bed type)    Moisture Interventions: Minimize layers, Apply protective barrier, creams and emollients    Activity Interventions: Pressure redistribution bed/mattress(bed type)    Mobility Interventions: Float heels, Turn and reposition approx. every two hours(pillow and wedges)    Nutrition Interventions: Document food/fluid/supplement intake    Friction and Shear Interventions: Minimize layers, Feet elevated on foot rest                Problem: Patient Education: Go to Patient Education Activity  Goal: Patient/Family Education  Outcome: Progressing Towards Goal     Problem: Falls - Risk of  Goal: *Absence of Falls  Description: Document Jacey Farrell Fall Risk and appropriate interventions in the flowsheet.   Outcome: Progressing Towards Goal  Note: Fall Risk Interventions:  Mobility Interventions: Bed/chair exit alarm    Mentation Interventions: Adequate sleep, hydration, pain control, Bed/chair exit alarm, Door open when patient unattended    Medication Interventions: Bed/chair exit alarm    Elimination Interventions: Bed/chair exit alarm, Call light in reach    History of Falls Interventions: Bed/chair exit alarm, Door open when patient unattended         Problem: Patient Education: Go to Patient Education Activity  Goal: Patient/Family Education  Outcome: Progressing Towards Goal

## 2021-05-30 NOTE — PROGRESS NOTES
Problem: Pressure Injury - Risk of  Goal: *Prevention of pressure injury  Description: Document Bigg Scale and appropriate interventions in the flowsheet. Outcome: Progressing Towards Goal  Note: Pressure Injury Interventions:  Sensory Interventions: Assess changes in LOC, Check visual cues for pain, Float heels, Pressure redistribution bed/mattress (bed type)    Moisture Interventions: Apply protective barrier, creams and emollients, Maintain skin hydration (lotion/cream)    Activity Interventions: Pressure redistribution bed/mattress(bed type)    Mobility Interventions: Float heels, HOB 30 degrees or less, Pressure redistribution bed/mattress (bed type)    Nutrition Interventions: Document food/fluid/supplement intake, Offer support with meals,snacks and hydration    Friction and Shear Interventions: Apply protective barrier, creams and emollients, HOB 30 degrees or less, Lift sheet                Problem: Falls - Risk of  Goal: *Absence of Falls  Description: Document Anya Fall Risk and appropriate interventions in the flowsheet.   Outcome: Progressing Towards Goal  Note: Fall Risk Interventions:  Mobility Interventions: Bed/chair exit alarm    Mentation Interventions: Adequate sleep, hydration, pain control, Bed/chair exit alarm, Door open when patient unattended    Medication Interventions: Bed/chair exit alarm    Elimination Interventions: Bed/chair exit alarm, Call light in reach    History of Falls Interventions: Bed/chair exit alarm, Door open when patient unattended

## 2021-05-30 NOTE — PROGRESS NOTES
1900 - Report received    1940 - Shift Assessment: Patient alert to self, in bed, watching TV, denies pain with no non-verbal S/S of pain or discomfort noted, lungs clear, diminished in the bases, + BS x 4.    2040 - Patient alert, watching TV, medicated with scheduled po Seroquel and Zoloft, given fresh ice water. 2140 - Patient awake, sitting up in bed watching TV, no c/o pain with no non-verbal S/ S of pain or discomfort noted. 2325 - Patient resting with her eyes closed, no distress noted. 0125 - Patient sleeping, no discomfort noted. 0320 - Patient sleeping, no distress noted    0520 - Patient resting quietly with eyes open, denies pain with no non-verbal S/S of pain or discomfort noted. 6470 - Patient sleeping, no distress noted. 0700 - Report given to oncoming nurse.      NAME OF PATIENT:  Kaylin Hylton    LEVEL OF CARE:  Respite    REASON FOR GIP: NA      PATIENT REMAINS ELIGIBLE FOR [facility] LEVEL OF CARE AS EVIDENCED BY: (MUST BE ADDRESSED OF PATIENT GIP)  NA      REASON FOR RESPITE:  Caregiver exhaustion      O2 SAFETY:  Concentrator positioning (6\" from furniture/drapes) and No petroleum based products on face while oxygen in use    FALL INTERVENTIONS PROVIDED:   Implemented/recommended use of non-skid footwear, Implemented/recommended use of fall risk identification flag to all team members, Implemented/recommended resources for alarm system (personal alarm, bed alarm, call bell, etc.)  and Implemented/recommended increased supervision/assistance    INTERDISPLINARY COMMUNICATION/COLLABORATION: NA      NEW MEDICATION INITIATION DOCUMENTATION: NA      Reason medication is being initiated:  NA    MD / Provider name consulted re: change in status / initiation of new medication:  NA    New Symptom(s):  NA    New Order(s): NA    Name of the person notified of the changes:  NA    Name of person being taught:  NA    Instructions given:  NA    Side Effects taught:  NA    Response to teaching:  NA      COMFORTABLE DYING MEASURE:  Is Patient/family satisfied with symptom level?   Yes     DISCHARGE PLAN:  Home with family

## 2021-05-30 NOTE — HOSPICE
NAME OF PATIENT:  Blane Mohamud    LEVEL OF CARE:  Respite    REASON FOR GIP: n/a      *PATIENT REMAINS ELIGIBLE FOR GIP LEVEL OF CARE AS EVIDENCED BY: (MUST BE ADDRESSED OF PATIENT GIP)      REASON FOR RESPITE:  Caregiver breakdown    O2 SAFETY: n/a      FALL INTERVENTIONS PROVIDED:   Implemented/recommended use of non-skid footwear, Implemented/recommended use of fall risk identification flag to all team members, Implemented/recommended assistive devices and encouraged their use, Implemented/recommended resources for alarm system (personal alarm, bed alarm, call bell, etc.) , Implemented/recommended environmental changes (remove hazards, lower bed, improve lighting, etc.) and Implemented/recommended increased supervision/assistance    INTERDISPLINARY COMMUNICATION/COLLABORATION:  RNGARY    NEW MEDICATION INITIATION DOCUMENTATION: n/a      Reason medication is being initiated:   n/a    MD / Provider name consulted re: change in status / initiation of new medication:   n/a    New Symptom(s):   n/a    New Order(s):   n/a    Name of the person notified of the changes:   n/a    Name of person being taught:   n/a    Instructions given:   n/a    Side Effects taught:   n/a    Response to teaching:   n/a      COMFORTABLE DYING MEASURE:  Is Patient/family satisfied with symptom level?  yes    DISCHARGE PLAN:  Patient may pass at [facility], should patient stabilize will return home with family. 0700  Report received from [address]  0900 patient sitting up in chair eating breakfast.  Denies any complaints. Patient remains pleasantly confused. 1040  Patient returned to bed, positioned for comfort. Bed alarm engaged. 1230  Patient sitting up in bed eating lunch. Denies any complaints. 1430  Resting quietly with eyes closed. 36  Continues to rest quietly with eyes closed. 1830  Tolerated dinner well, resting quietly with eyes open watching television. 1900  Report to be given to on coming nurse.

## 2021-05-31 VITALS
TEMPERATURE: 98.4 F | RESPIRATION RATE: 18 BRPM | DIASTOLIC BLOOD PRESSURE: 62 MMHG | OXYGEN SATURATION: 95 % | HEART RATE: 75 BPM | SYSTOLIC BLOOD PRESSURE: 151 MMHG

## 2021-05-31 PROCEDURE — 74011000250 HC RX REV CODE- 250: Performed by: INTERNAL MEDICINE

## 2021-05-31 PROCEDURE — 74011250637 HC RX REV CODE- 250/637: Performed by: INTERNAL MEDICINE

## 2021-05-31 PROCEDURE — 74011250637 HC RX REV CODE- 250/637: Performed by: FAMILY MEDICINE

## 2021-05-31 PROCEDURE — 0655 HSPC INPATIENT RESPITE

## 2021-05-31 PROCEDURE — G0299 HHS/HOSPICE OF RN EA 15 MIN: HCPCS

## 2021-05-31 RX ADMIN — SENNOSIDES 17.2 MG: 8.8 LIQUID ORAL at 09:03

## 2021-05-31 RX ADMIN — QUETIAPINE FUMARATE 12.5 MG: 25 TABLET ORAL at 21:00

## 2021-05-31 RX ADMIN — Medication 1 CAPSULE: at 09:04

## 2021-05-31 RX ADMIN — LOSARTAN POTASSIUM 50 MG: 50 TABLET, FILM COATED ORAL at 09:03

## 2021-05-31 RX ADMIN — SERTRALINE HYDROCHLORIDE 100 MG: 100 TABLET, FILM COATED ORAL at 21:00

## 2021-05-31 NOTE — PROGRESS NOTES
Problem: Pressure Injury - Risk of  Goal: *Prevention of pressure injury  Description: Document Bigg Scale and appropriate interventions in the flowsheet. Outcome: Progressing Towards Goal  Note: Pressure Injury Interventions:  Sensory Interventions: Assess changes in LOC, Check visual cues for pain, Keep linens dry and wrinkle-free, Float heels, Pressure redistribution bed/mattress (bed type)    Moisture Interventions: Apply protective barrier, creams and emollients, Minimize layers, Moisture barrier    Activity Interventions: Pressure redistribution bed/mattress(bed type)    Mobility Interventions: Float heels, Turn and reposition approx. every two hours(pillow and wedges)    Nutrition Interventions: Document food/fluid/supplement intake, Offer support with meals,snacks and hydration    Friction and Shear Interventions: Apply protective barrier, creams and emollients, HOB 30 degrees or less, Lift sheet                Problem: Falls - Risk of  Goal: *Absence of Falls  Description: Document Anya Fall Risk and appropriate interventions in the flowsheet.   Outcome: Progressing Towards Goal  Note: Fall Risk Interventions:  Mobility Interventions: Bed/chair exit alarm, Patient to call before getting OOB    Mentation Interventions: Adequate sleep, hydration, pain control, Bed/chair exit alarm, Door open when patient unattended    Medication Interventions: Bed/chair exit alarm    Elimination Interventions: Bed/chair exit alarm, Call light in reach    History of Falls Interventions: Bed/chair exit alarm         Problem: Patient Education: Go to Patient Education Activity  Goal: Patient/Family Education  Outcome: Progressing Towards Goal

## 2021-05-31 NOTE — HOSPICE
NAME OF PATIENT:  Johnnie Oswald    LEVEL OF CARE:  Respite    REASON FOR GIP: n/a      *PATIENT REMAINS ELIGIBLE FOR GIP LEVEL OF CARE AS EVIDENCED BY: (MUST BE ADDRESSED OF PATIENT GIP)      REASON FOR RESPITE:  Caregiver breakdown    O2 SAFETY: n/a      FALL INTERVENTIONS PROVIDED:   Implemented/recommended use of non-skid footwear, Implemented/recommended use of fall risk identification flag to all team members, Implemented/recommended assistive devices and encouraged their use, Implemented/recommended resources for alarm system (personal alarm, bed alarm, call bell, etc.) , Implemented/recommended environmental changes (remove hazards, lower bed, improve lighting, etc.) and Implemented/recommended increased supervision/assistance    INTERDISPLINARY COMMUNICATION/COLLABORATION:  GARY VILLAR    NEW MEDICATION INITIATION DOCUMENTATION:   n/a    Reason medication is being initiated:   n/a    MD / Provider name consulted re: change in status / initiation of new medication:   n/a    New Symptom(s):   n/a    New Order(s):   n/a    Name of the person notified of the changes:   n/a    Name of person being taught:   n/a    Instructions given:   n/a    Side Effects taught:   n/a    Response to teaching:   n/a      COMFORTABLE DYING MEASURE:  Is Patient/family satisfied with symptom level?  yes    DISCHARGE PLAN:  Patient may pass at [facility], should patient stabilize will return home with family. 0700  Report received from [address]  0900  Patient pleasantly confused, ate 100% breakfast.  Took morning medications well. Resting quietly watching television. 1100  Incontinent large amount of urine a smear of stool, meño care completed and patient sitting up in chair with chair alarm on.  1300  Patient tolerated lunch well, remain sitting up in chair with alarm engaged. 1430  Resting with eyes closed. 1540  Pet therapy dog visit at bedside. 1710  Patient resting quietly starting to eat dinner.

## 2021-05-31 NOTE — HOSPICE
NAME OF PATIENT:  Raenette Sacks LEVEL OF CARE:  Respite REASON FOR GIP: n/a *PATIENT REMAINS ELIGIBLE FOR GIP LEVEL OF CARE AS EVIDENCED BY: (MUST BE ADDRESSED OF PATIENT GIP) REASON FOR RESPITE: 
Caregiver breakdown O2 SAFETY: n/a 
 
 
FALL INTERVENTIONS PROVIDED:  
Implemented/recommended use of non-skid footwear, Implemented/recommended use of fall risk identification flag to all team members, Implemented/recommended assistive devices and encouraged their use, Implemented/recommended resources for alarm system (personal alarm, bed alarm, call bell, etc.) , Implemented/recommended environmental changes (remove hazards, lower bed, improve lighting, etc.) and Implemented/recommended increased supervision/assistance INTERDISPLINARY COMMUNICATION/COLLABORATION: 
GARY VILLAR 
 
NEW MEDICATION INITIATION DOCUMENTATION:   n/a Reason medication is being initiated:   n/a 
 
MD / Provider name consulted re: change in status / initiation of new medication:   n/a New Symptom(s):   n/a New Order(s):   n/a Name of the person notified of the changes:   n/a Name of person being taught:   n/a Instructions given:   n/a Side Effects taught:   n/a Response to teaching:   n/a 
 
 
COMFORTABLE DYING MEASURE: 
Is Patient/family satisfied with symptom level?  yes DISCHARGE PLAN:  Patient may pass at [facility], should patient stabilize will return home with family. 0700  Report received from [address] 
0900  Patient pleasantly confused, ate 100% breakfast.  Took morning medications well. Resting quietly watching television. 1100  Incontinent large amount of urine a smear of stool, meño care completed and patient sitting up in chair with chair alarm on. 
1300  Patient tolerated lunch well, remain sitting up in chair with alarm engaged. 1430  Resting with eyes closed. 1540  Pet therapy dog visit at bedside. 1710  Patient resting quietly starting to eat dinner.  
1820  Patient resting quietly, brief changed earlier this afternoon.

## 2021-05-31 NOTE — PROGRESS NOTES
1900 - Report received    1930 - Shift Assessment: Patient sitting up in bed, watching TV, alert with some confusion, denies pain with no non-verbal SS of pian or discomfort noted, lungs clear, +BS x4.    2100 - Medicated with scheduled po Seroquel and Zoloft. 2215 - Patient resting with eyes open, watching TV, no distress noted. 2250 - Patient resting quietly in bed watching TV, no S/S of discomfort. 65 - Patient sleeping with no distress noted. 3407 - Patient sleeping with no discomfort noted. 5570 - Patient resting with her eyes closed, no S/S of distress. 9645 - Patient resting with her eyes closed on her back with no discomfort noted. 0700 - Report given to oncoming nurse. NAME OF PATIENT:  Raenette Sacks    LEVEL OF CARE:  Respite    REASON FOR GIP: NA      PATIENT REMAINS ELIGIBLE FOR GIP LEVEL OF CARE AS EVIDENCED BY: (MUST BE ADDRESSED OF PATIENT GIP)  NA      REASON FOR RESPITE: Caregiver exhasution      O2 SAFETY:  Concentrator positioning (6\" from furniture/drapes) and No petroleum based products on face while oxygen in use    FALL INTERVENTIONS PROVIDED:   Implemented/recommended use of non-skid footwear, Implemented/recommended use of fall risk identification flag to all team members, Implemented/recommended resources for alarm system (personal alarm, bed alarm, call bell, etc.)  and Implemented/recommended increased supervision/assistance    INTERDISPLINARY COMMUNICATION/COLLABORATION: NA      NEW MEDICATION INITIATION DOCUMENTATION: NA      Reason medication is being initiated:  KALIN    MD / Provider name consulted re: change in status / initiation of new medication:  NA    New Symptom(s):  NA    New Order(s):  NA    Name of the person notified of the changes:  NA    Name of person being taught:  NA    Instructions given:  NA    Side Effects taught:  NA    Response to teaching:  NA      COMFORTABLE DYING MEASURE:  Is Patient/family satisfied with symptom level?  Yes DISCHARGE PLAN:  Home on 6/1/2021

## 2021-05-31 NOTE — PROGRESS NOTES
Problem: Falls - Risk of  Goal: *Absence of Falls  Description: Document Mauricio Castillo Fall Risk and appropriate interventions in the flowsheet.   Outcome: Progressing Towards Goal  Note: Fall Risk Interventions:  Mobility Interventions: Bed/chair exit alarm, Patient to call before getting OOB    Mentation Interventions: Adequate sleep, hydration, pain control, Bed/chair exit alarm, Door open when patient unattended    Medication Interventions: Bed/chair exit alarm    Elimination Interventions: Bed/chair exit alarm, Call light in reach    History of Falls Interventions: Bed/chair exit alarm

## 2021-06-01 PROCEDURE — HOSPICE MEDICATION HC HH HOSPICE MEDICATION

## 2021-06-01 PROCEDURE — 0651 HSPC ROUTINE HOME CARE

## 2021-06-01 PROCEDURE — 74011000250 HC RX REV CODE- 250: Performed by: INTERNAL MEDICINE

## 2021-06-01 PROCEDURE — 74011250637 HC RX REV CODE- 250/637: Performed by: FAMILY MEDICINE

## 2021-06-01 PROCEDURE — 74011250637 HC RX REV CODE- 250/637: Performed by: INTERNAL MEDICINE

## 2021-06-01 PROCEDURE — G0299 HHS/HOSPICE OF RN EA 15 MIN: HCPCS

## 2021-06-01 RX ADMIN — Medication 1 CAPSULE: at 10:02

## 2021-06-01 RX ADMIN — LOSARTAN POTASSIUM 50 MG: 50 TABLET, FILM COATED ORAL at 10:01

## 2021-06-01 RX ADMIN — SENNOSIDES 17.2 MG: 8.8 LIQUID ORAL at 10:02

## 2021-06-01 NOTE — PROGRESS NOTES
1900 - Report received    1925 - Shift Assessment: Patient sitting up in recyliner at bedside, watching TV, A&O x2, states she needs to void but has already been incontinent of urine, meño care completed and brief changed, lungs clear, denies pain or discomfort and no non-verbal S/S of pain or discomfort noted. 2050 - Patient assisted to ambulate to the sink and brushed her teeth and then  helped back tp bed, given scheduled po Seroquel and Zoloft with fresh water. 2140 - Patient alert and watching TV, no discomfort noted. 2330 - Patient resting quietly with her eyes closed. 0130 - Patient sleeping with no  S/S of discomfort. 0125 - Patient sleeping with no distress noted. 0320 - Patient sleeping, neutral expression    0455 - Patient resting quietly with eyes closed. 3441 - Patient incontinent of urine, meño care completed, diaper, gown, draw sheet and bottom sheet changed. Given fresh water, no complaints of discomfort.     0700 - Report given to oncoming nurse.        NAME OF PATIENT:  Kimmie Odom    LEVEL OF CARE:  Respite    REASON FOR GIP: NA      PATIENT REMAINS ELIGIBLE FOR [facility] LEVEL OF CARE AS EVIDENCED BY: (MUST BE ADDRESSED OF PATIENT GIP)  NA      REASON FOR RESPITE: Caregiver exhaustion      O2 SAFETY:  Concentrator positioning (6\" from furniture/drapes) and No petroleum based products on face while oxygen in use    FALL INTERVENTIONS PROVIDED:   Implemented/recommended use of non-skid footwear, Implemented/recommended use of fall risk identification flag to all team members, Implemented/recommended resources for alarm system (personal alarm, bed alarm, call bell, etc.)  and Implemented/recommended increased supervision/assistance    INTERDISPLINARY COMMUNICATION/COLLABORATION:  NA      NEW MEDICATION INITIATION DOCUMENTATION:  NA      Reason medication is being initiated:  NA    MD / Provider name consulted re: change in status / initiation of new medication: NA    New Symptom(s):  NA    New Order(s):  NA    Name of the person notified of the changes:  NA    Name of person being taught:  NA    Instructions given:  NA    Side Effects taught:  NA    Response to teaching:  NA      COMFORTABLE DYING MEASURE:  Is Patient/family satisfied with symptom level?  Yes     DISCHARGE PLAN:  Home with family on 6/1/2021

## 2021-06-01 NOTE — PROGRESS NOTES
Problem: Pressure Injury - Risk of  Goal: *Prevention of pressure injury  Description: Document Bigg Scale and appropriate interventions in the flowsheet. Outcome: Progressing Towards Goal  Note: Pressure Injury Interventions:  Sensory Interventions: Assess changes in LOC, Check visual cues for pain, Float heels, Keep linens dry and wrinkle-free, Pressure redistribution bed/mattress (bed type)    Moisture Interventions: Absorbent underpads, Apply protective barrier, creams and emollients, Maintain skin hydration (lotion/cream)    Activity Interventions: Pressure redistribution bed/mattress(bed type)    Mobility Interventions: Float heels, HOB 30 degrees or less, Pressure redistribution bed/mattress (bed type)    Nutrition Interventions: Document food/fluid/supplement intake, Offer support with meals,snacks and hydration    Friction and Shear Interventions: Apply protective barrier, creams and emollients, HOB 30 degrees or less, Lift sheet                Problem: Falls - Risk of  Goal: *Absence of Falls  Description: Document Anya Fall Risk and appropriate interventions in the flowsheet.   Outcome: Progressing Towards Goal  Note: Fall Risk Interventions:  Mobility Interventions: Bed/chair exit alarm, Patient to call before getting OOB    Mentation Interventions: Adequate sleep, hydration, pain control, Bed/chair exit alarm, Door open when patient unattended, Reorient patient    Medication Interventions: Bed/chair exit alarm    Elimination Interventions: Bed/chair exit alarm, Call light in reach, Patient to call for help with toileting needs    History of Falls Interventions: Bed/chair exit alarm, Door open when patient unattended

## 2021-06-01 NOTE — PROGRESS NOTES
0700- Received report from Shani Gates 1- Pt resting quietly in bed, supported by pillows. Heels are floated. Respirations are regular and unlabored. 0900- Pt remains sleeping. No signs of distress at this time. 1000- Scheduled meds given per MAR. Pt requested to walk to the bathroom. Pt was unable to stand without needing to sit back down. Pt is very unsteady on her feet. Set up breakfast tray for pt.     1100- Therapy dog and handler are at the bedside. Pt finished eating breakfast. Brought some juices to pt as requested. No further needs expressed at this time. 1255- Pt picked up by [facility]. Pt discharged home with: home meds, walker and clothing. Pt was alert and oriented upon discharge and had no complaints of pain.        NAME OF PATIENT:  Lyndsey Woodward    LEVEL OF CARE:  Respite    REASON FOR GIP:   NA    *PATIENT REMAINS ELIGIBLE FOR GIP LEVEL OF CARE AS EVIDENCED BY: (MUST BE ADDRESSED OF PATIENT GIP)      REASON FOR RESPITE:  Caregiver Exhaustion    O2 SAFETY:  Concentrator positioning (6\" from furniture/drapes) and No petroleum based products on face while oxygen in use    FALL INTERVENTIONS PROVIDED:   Implemented/recommended use of non-skid footwear, Implemented/recommended resources for alarm system (personal alarm, bed alarm, call bell, etc.) , Implemented/recommended environmental changes (remove hazards, lower bed, improve lighting, etc.) and Implemented/recommended increased supervision/assistance    INTERDISPLINARY COMMUNICATION/COLLABORATION:  Physician, MSW, Julien and RN, CNA    NEW MEDICATION INITIATION DOCUMENTATION:  NA    Reason medication is being initiated:  NA    MD / Provider name consulted re: change in status / initiation of new medication:  NA    New Symptom(s):  NA    New Order(s):  NA    Name of the person notified of the changes:  NA    Name of person being taught:  NA    Instructions given:  NA    Side Effects taught:  NA    Response to teaching: NA      COMFORTABLE DYING MEASURE:  Is Patient/family satisfied with symptom level?  yes    DISCHARGE PLAN:  Pt will return home upon completion of her respite stay and will be followed by the home hospice team.

## 2021-06-01 NOTE — PROGRESS NOTES
Problem: Pressure Injury - Risk of  Goal: *Prevention of pressure injury  Description: Document Bigg Scale and appropriate interventions in the flowsheet. 6/1/2021 1301 by Jade Lamar RN  Outcome: Resolved/Met  6/1/2021 0813 by Jade Lamar RN  Outcome: Progressing Towards Goal  Note: Pressure Injury Interventions:  Sensory Interventions: Assess changes in LOC    Moisture Interventions: Absorbent underpads    Activity Interventions: Pressure redistribution bed/mattress(bed type)    Mobility Interventions: Float heels, HOB 30 degrees or less    Nutrition Interventions: Document food/fluid/supplement intake    Friction and Shear Interventions: Apply protective barrier, creams and emollients                Problem: Patient Education: Go to Patient Education Activity  Goal: Patient/Family Education  6/1/2021 1301 by Jade Lamar RN  Outcome: Resolved/Met  6/1/2021 0813 by Jade Lamar RN  Outcome: Progressing Towards Goal     Problem: Falls - Risk of  Goal: *Absence of Falls  Description: Document Anya Fall Risk and appropriate interventions in the flowsheet.   6/1/2021 1301 by Jade Lamar RN  Outcome: Resolved/Met  6/1/2021 0813 by Jade Lamar RN  Outcome: Progressing Towards Goal  Note: Fall Risk Interventions:  Mobility Interventions: Bed/chair exit alarm    Mentation Interventions: Adequate sleep, hydration, pain control    Medication Interventions: Bed/chair exit alarm    Elimination Interventions: Bed/chair exit alarm    History of Falls Interventions: Bed/chair exit alarm         Problem: Patient Education: Go to Patient Education Activity  Goal: Patient/Family Education  6/1/2021 1301 by Jade Lamar RN  Outcome: Resolved/Met  6/1/2021 0813 by Jade Lamar RN  Outcome: Progressing Towards Goal

## 2021-06-01 NOTE — [facility] IDG MASTER NOTE
Hospice Interdisciplinary Group Collaborative  Date: 06/01/21  Time: 12:19 PM    ___________________    Patient: Preeti Watts  Coverage Information:     [place]or: [place]     Plan: [place] MED[place] PART A & B     Subscriber ID: [id number]     Phone Number:   MRN: [id number]  CCN:   [place] Claim No. :    Hospice Election Date:   Current Benefit Period: Benefit Period 1  Start Date: 3/14/2021  End Date: 6/11/2021      Medical Director:   Hospice Attending Provider: Alyssa Light MD   [address] [address]  Phone: [phone]  Fax: [phone]    Level of Care: Inpatient Respite Care      ___________________    Diagnoses: There were no encounter diagnoses.     Current Medications:    Current Facility-Administered Medications:     sennosides (SENOKOT) 8.8 mg/5 mL syrup 17.2 mg, 17.2 mg, Oral, DAILY, Alyssa Light MD, 17.2 mg at 06/01/21 1002    bisacodyL (DULCOLAX) suppository 10 mg, 10 mg, Rectal, DAILY PRN, Odette Weber MD    acetaminophen (TYLENOL) suppository 650 mg, 650 mg, Rectal, Q4H PRN, Lorena Perez MD    haloperidol (HALDOL) 2 mg/mL oral solution 1 mg, 1 mg, Oral, Q6H PRN, Lorena Perez MD    hyoscyamine SL (LEVSIN/SL) tablet 0.125 mg, 0.125 mg, SubLINGual, Q4H PRN, Lorena Perez MD    LORazepam (INTENSOL) 2 mg/mL oral concentrate 0.5 mg, 0.5 mg, Oral, Q4H PRN, Lorena Perez MD    losartan (COZAAR) tablet 50 mg (Patient Supplied), 50 mg, Oral, DAILY, Lorena Perez MD, 50 mg at 06/01/21 1001    morphine (ROXANOL) 100 mg/5 mL (20 mg/mL) concentrated solution 5 mg, 5 mg, Oral, Q2H PRN, Lorena Perez MD    prochlorperazine (COMPAZINE) tablet 10 mg, 10 mg, Oral, Q6H PRN, Lorena Perez MD    QUEtiapine (SEROquel) tablet 12.5 mg, 12.5 mg, Oral, QHS, Lorena Perez MD, 12.5 mg at 05/31/21 2100    sertraline (ZOLOFT) tablet 100 mg (Patient Supplied), 100 mg, Oral, QHS, Lorena Perez MD, 100 mg at 05/31/21 2100    Proanthocyanidins (Cranberry juice concentrate extrac) (Patient Supplied), 1 Capsule, Oral, DAILY, Lorena Perez MD, 1 Capsule at 06/01/21 1002    naproxen sodium (NAPROSYN) tablet 220 mg (Patient Supplied), 220 mg, Oral, Q12H PRN, Ml Meneses MD    Orders:  Orders Placed This Encounter    DIET REGULAR     Standing Status:   Standing     Number of Occurrences:   1    NURSING-MISCELLANEOUS: Respite LOC Admit to Respite level of care for caregiver exhaustion; SN visits 2x weekly with 5 PRN visits; CNA daily x5 days. CH 1x weekly with 5 PRN. CONTINUOUS     Admit to Respite level of care for caregiver exhaustion; SN visits 2x weekly with 5 PRN visits; CNA daily x5 days. CH 1x weekly with 5 PRN. Standing Status:   Standing     Number of Occurrences:   1     Order Specific Question:   Description of Order:     Answer:   Respite LOC    NURSING-MISCELLANEOUS: (see comments) 1. NO admission labs, x-rays or other diagnostic tests, unless pertinent to symptom control . 2. Discontinue ALL prior medications. CONTINUOUS     1. NO admission labs, x-rays or other diagnostic tests, unless pertinent to symptom control . 2. Discontinue ALL prior medications. Standing Status:   Standing     Number of Occurrences:   1     Order Specific Question:   Description of Order:     Answer:   (see comments)    COMFORT MEASURES ONLY     Standing Status:   Standing     Number of Occurrences:   1    VITAL SIGNS     Daily     Standing Status:   Standing     Number of Occurrences:   85286    NOTIFY PROVIDER: SPECIFY NOTIFY PROVIDER: FOR PAIN, DYSPNEA, AGITATION, OTHER DISTRESS OR NOT RESPONDING TO ORDERED INTERVENTIONS CONTINUOUS Routine     Standing Status:   Standing     Number of Occurrences:   1     Order Specific Question:   Please describe the test or procedure you would like to order.      Answer:   NOTIFY PROVIDER: FOR PAIN, DYSPNEA, AGITATION, OTHER DISTRESS OR NOT RESPONDING TO ORDERED INTERVENTIONS    ORAL CARE     Keep mouth moisturized with sponge sticks/toothettes and tap water. Vaseline to lips and nares as needed. Standing Status:   Standing     Number of Occurrences:   1    ACTIVITY AS TOLERATED W/ASSIST     Standing Status:   Standing     Number of Occurrences:   1    NURSING-MISCELLANEOUS: LOC respite Admit to resptie LOC for caregiver exhaustion; SNV 2x/week with 5 visits prn for for symptom management; CNA visits daily x5days; CH 1x/week with 5 prn  CONTINUOUS     Admit to resptie LOC for caregiver exhaustion; SNV 2x/week with 5 visits prn for for symptom management; CNA visits daily x5days; CH 1x/week with 5 prn     Standing Status:   Standing     Number of Occurrences:   1     Order Specific Question:   Description of Order:     Answer:   LOC respite    DO NOT RESUSCITATE     Standing Status:   Standing     Number of Occurrences:   1    OXYGEN CANNULA Liters per minute: 2; Indications for O2 therapy: OTHER PRN Routine     Oxygen as needed. Adjust for comfort. Discontinue if not contributing to patient comfort. Standing Status:   Standing     Number of Occurrences:   01323     Order Specific Question:   Liters per minute: Answer:   2     Order Specific Question:   Indications for O2 therapy     Answer:   OTHER    FALL PRECAUTIONS     Standing Status:   Standing     Number of Occurrences:   1    bisacodyL (DULCOLAX) suppository 10 mg    acetaminophen (TYLENOL) suppository 650 mg    DISCONTD: OTHER(NON-FORMULARY) 450 Tablet     Order Specific Question:   Specific disease being treated with this drug.      Answer:   supplement     Order Specific Question:   Is this requested medication unique in class, structure or indication     Answer:   No     Order Specific Question:   Reasons for patient to receive nonformulary medication     Answer:   home medication    haloperidol (HALDOL) 2 mg/mL oral solution 1 mg    hyoscyamine SL (LEVSIN/SL) tablet 0.125 mg    LORazepam (INTENSOL) 2 mg/mL oral concentrate 0.5 mg    losartan (COZAAR) tablet 50 mg (Patient Supplied)    morphine (ROXANOL) 100 mg/5 mL (20 mg/mL) concentrated solution 5 mg    DISCONTD: naproxen sodium (NAPROSYN) tablet 220 mg    prochlorperazine (COMPAZINE) tablet 10 mg    DISCONTD: psyllium husk-aspartame (METAMUCIL FIBER) packet 1 Packet    QUEtiapine (SEROquel) tablet 12.5 mg    DISCONTD: senna (SENOKOT) tablet 17.2 mg    sertraline (ZOLOFT) tablet 100 mg (Patient Supplied)    Proanthocyanidins (Cranberry juice concentrate extrac) (Patient Supplied)     Order Specific Question:   Specific disease being treated with this drug. Answer:   frequent UTIs     Order Specific Question:   Is this requested medication unique in class, structure or indication     Answer:   No     Order Specific Question:   Reasons for patient to receive nonformulary medication     Answer:   not in Epic    naproxen sodium (NAPROSYN) tablet 220 mg (Patient Supplied)    DISCONTD: psyllium husk-aspartame (METAMUCIL FIBER) packet 1 Packet (Patient Supplied)    DISCONTD: senna (SENOKOT) tablet 17.2 mg (Patient Supplied)    DISCONTD: psyllium husk-aspartame (METAMUCIL FIBER) packet 1 Packet (Patient Supplied)    DISCONTD: senna (SENOKOT) tablet 17.2 mg (Patient Supplied)    sennosides (SENOKOT) 8.8 mg/5 mL syrup 17.2 mg    INITIAL PHYSICIAN ORDER: HOSPICE Level Of Care: Respite; Reason for Admission: Caregiver Exhaustion     Standing Status:   Standing     Number of Occurrences:   1     Order Specific Question:   Status     Answer:   Hospice     Order Specific Question:   Level Of Care     Answer:   Respite     Order Specific Question:   Reason for Admission     Answer:   Caregiver Exhaustion     Order Specific Question:   Admitting Physician     Answer:   Simin Speed     Order Specific Question:   Attending Physician     Answer:   Simin Speed     Order Specific Question:   Discharge Plan:     Answer:   Home with Home Hospice       Allergies:   Allergies   Allergen Reactions    Codeine Nausea Only    Penicillins Hives       Care Plan:  Encounter Problems (Active)     Problem: Falls - Risk of     Dates: Start: 05/27/21       Disciplines: Interdisciplinary    Goal: *Absence of Falls     Dates: Start: 05/27/21   Expected End: 06/01/21       Description: Document Lear Shaker Fall Risk and appropriate interventions in the flowsheet. Disciplines: Interdisciplinary          Problem: Patient Education: Go to Patient Education Activity     Dates: Start: 05/27/21       Disciplines: Interdisciplinary    Goal: Patient/Family Education     Dates: Start: 05/27/21       Disciplines: Interdisciplinary          Problem: Patient Education: Go to Patient Education Activity     Dates: Start: 05/27/21       Disciplines: Interdisciplinary    Goal: Patient/Family Education     Dates: Start: 05/27/21       Disciplines: Interdisciplinary          Problem: Pressure Injury - Risk of     Dates: Start: 05/27/21       Disciplines: Interdisciplinary    Goal: *Prevention of pressure injury     Dates: Start: 05/27/21   Expected End: 06/01/21       Description: Document Bigg Scale and appropriate interventions in the flowsheet.     Disciplines: Interdisciplinary         Care Plan Problems/Goals      Progressing Towards Goal (4)      *Prevention of pressure injury (Pressure Injury - Risk of)    Disciplines:  Interdisciplinary Expected end:  06/01/21        Outcome: Progressing Towards Goal By Alda Bee RN on 06/01/21 0813            Patient/Family Education (Patient Education: Go to Patient Education Activity)    Disciplines:  Interdisciplinary Expected end:  -        Outcome: Progressing Towards Goal By Alda Bee RN on 06/01/21 0813            *Absence of Falls (Falls - Risk of)    Disciplines:  Interdisciplinary Expected end:  06/01/21        Outcome: Progressing Towards Goal By Alda Bee RN on 06/01/21 0813            Patient/Family Education (Patient Education: Go to Patient Education Activity)    Disciplines:  Interdisciplinary Expected end:  -        Outcome: Progressing Towards Goal By Micheal Tellez RN on 06/01/21 0813                              ___________________    Care Team Notes    IDG- clinical note- 6/1- pt is respite loc at [facility].  Pt for d/c to home today. No noted changes or needs at this time. POC/IDG Notes    No notes of this type exist for this encounter.               Care Team Present:       Toma Fabian RN  [address] [place] [address]  Johan Camilo MSW  [address] Nurse Leader, RN  Dr Saran Carmona

## 2021-06-01 NOTE — PROGRESS NOTES
Problem: Pressure Injury - Risk of  Goal: *Prevention of pressure injury  Description: Document Bigg Scale and appropriate interventions in the flowsheet. Outcome: Progressing Towards Goal  Note: Pressure Injury Interventions:  Sensory Interventions: Assess changes in LOC    Moisture Interventions: Absorbent underpads    Activity Interventions: Pressure redistribution bed/mattress(bed type)    Mobility Interventions: Float heels, HOB 30 degrees or less    Nutrition Interventions: Document food/fluid/supplement intake    Friction and Shear Interventions: Apply protective barrier, creams and emollients                Problem: Patient Education: Go to Patient Education Activity  Goal: Patient/Family Education  Outcome: Progressing Towards Goal     Problem: Falls - Risk of  Goal: *Absence of Falls  Description: Document Anya Fall Risk and appropriate interventions in the flowsheet.   Outcome: Progressing Towards Goal  Note: Fall Risk Interventions:  Mobility Interventions: Bed/chair exit alarm    Mentation Interventions: Adequate sleep, hydration, pain control    Medication Interventions: Bed/chair exit alarm    Elimination Interventions: Bed/chair exit alarm    History of Falls Interventions: Bed/chair exit alarm         Problem: Patient Education: Go to Patient Education Activity  Goal: Patient/Family Education  Outcome: Progressing Towards Goal

## 2021-06-02 ENCOUNTER — HOME CARE VISIT (OUTPATIENT)
Dept: HOSPICE | Facility: HOSPICE | Age: 81
End: 2021-06-02
Payer: MEDICARE

## 2021-06-02 VITALS — HEART RATE: 78 BPM | SYSTOLIC BLOOD PRESSURE: 122 MMHG | RESPIRATION RATE: 18 BRPM | DIASTOLIC BLOOD PRESSURE: 60 MMHG

## 2021-06-02 PROCEDURE — 0651 HSPC ROUTINE HOME CARE

## 2021-06-02 PROCEDURE — HOSPICE MEDICATION HC HH HOSPICE MEDICATION

## 2021-06-02 PROCEDURE — G0299 HHS/HOSPICE OF RN EA 15 MIN: HCPCS

## 2021-06-03 PROCEDURE — 0651 HSPC ROUTINE HOME CARE

## 2021-06-04 PROCEDURE — 0651 HSPC ROUTINE HOME CARE

## 2021-06-05 PROCEDURE — 0651 HSPC ROUTINE HOME CARE

## 2021-06-06 PROCEDURE — 0651 HSPC ROUTINE HOME CARE

## 2021-06-07 PROCEDURE — 0651 HSPC ROUTINE HOME CARE

## 2021-06-08 PROCEDURE — 0651 HSPC ROUTINE HOME CARE

## 2021-06-09 PROCEDURE — 0651 HSPC ROUTINE HOME CARE

## 2021-06-10 PROCEDURE — HOSPICE MEDICATION HC HH HOSPICE MEDICATION

## 2021-06-10 PROCEDURE — 0651 HSPC ROUTINE HOME CARE

## 2021-06-11 ENCOUNTER — HOME CARE VISIT (OUTPATIENT)
Dept: SCHEDULING | Facility: HOME HEALTH | Age: 81
End: 2021-06-11
Payer: MEDICARE

## 2021-06-11 ENCOUNTER — HOME CARE VISIT (OUTPATIENT)
Dept: HOSPICE | Facility: HOSPICE | Age: 81
End: 2021-06-11
Payer: MEDICARE

## 2021-06-11 VITALS — HEART RATE: 76 BPM | SYSTOLIC BLOOD PRESSURE: 124 MMHG | RESPIRATION RATE: 18 BRPM | DIASTOLIC BLOOD PRESSURE: 72 MMHG

## 2021-06-11 PROCEDURE — G0155 HHCP-SVS OF CSW,EA 15 MIN: HCPCS

## 2021-06-11 PROCEDURE — G0299 HHS/HOSPICE OF RN EA 15 MIN: HCPCS

## 2021-06-11 PROCEDURE — 0651 HSPC ROUTINE HOME CARE

## 2021-06-12 PROCEDURE — 0651 HSPC ROUTINE HOME CARE

## 2021-06-13 PROCEDURE — 0651 HSPC ROUTINE HOME CARE

## 2021-06-14 PROCEDURE — 0651 HSPC ROUTINE HOME CARE

## 2021-06-15 ENCOUNTER — HOME CARE VISIT (OUTPATIENT)
Dept: HOSPICE | Facility: HOSPICE | Age: 81
End: 2021-06-15
Payer: MEDICARE

## 2021-06-15 PROCEDURE — 0651 HSPC ROUTINE HOME CARE

## 2021-06-15 PROCEDURE — HOSPICE MEDICATION HC HH HOSPICE MEDICATION

## 2021-06-15 PROCEDURE — G0299 HHS/HOSPICE OF RN EA 15 MIN: HCPCS

## 2021-07-03 ENCOUNTER — APPOINTMENT (OUTPATIENT)
Dept: CT IMAGING | Age: 81
End: 2021-07-03
Attending: EMERGENCY MEDICINE
Payer: MEDICARE

## 2021-07-03 ENCOUNTER — HOSPITAL ENCOUNTER (EMERGENCY)
Age: 81
Discharge: HOME | End: 2021-07-03
Attending: EMERGENCY MEDICINE
Payer: MEDICARE

## 2021-07-03 VITALS
DIASTOLIC BLOOD PRESSURE: 90 MMHG | WEIGHT: 172.84 LBS | BODY MASS INDEX: 24.8 KG/M2 | TEMPERATURE: 97.3 F | RESPIRATION RATE: 18 BRPM | SYSTOLIC BLOOD PRESSURE: 183 MMHG | OXYGEN SATURATION: 95 % | HEART RATE: 100 BPM

## 2021-07-03 DIAGNOSIS — S01.81XA LACERATION OF FOREHEAD, INITIAL ENCOUNTER: ICD-10-CM
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
## 2021-07-03 DIAGNOSIS — S09.90XA INJURY OF HEAD, INITIAL ENCOUNTER: ICD-10-CM

## 2021-07-03 LAB
GLUCOSE BLD STRIP.AUTO-MCNC: 125 MG/DL (ref 65–117)
SERVICE CMNT-IMP: ABNORMAL

## 2021-07-03 PROCEDURE — 72125 CT NECK SPINE W/O DYE: CPT

## 2021-07-03 PROCEDURE — 82962 GLUCOSE BLOOD TEST: CPT

## 2021-07-03 PROCEDURE — 70450 CT HEAD/BRAIN W/O DYE: CPT

## 2021-07-03 PROCEDURE — 74011000250 HC RX REV CODE- 250: Performed by: EMERGENCY MEDICINE

## 2021-07-03 PROCEDURE — 75810000293 HC SIMP/SUPERF WND  RPR

## 2021-07-03 PROCEDURE — 99284 EMERGENCY DEPT VISIT MOD MDM: CPT

## 2021-07-03 RX ORDER — BACITRACIN 500 UNIT/G
1 PACKET (EA) TOPICAL
Status: COMPLETED | OUTPATIENT
Start: 2021-07-03 | End: 2021-07-03

## 2021-07-03 RX ORDER — ACETAMINOPHEN 500 MG
1000 TABLET ORAL
Qty: 30 TABLET | Refills: 0 | Status: SHIPPED | OUTPATIENT
Start: 2021-07-03 | End: 2021-09-29

## 2021-07-03 RX ORDER — LIDOCAINE HYDROCHLORIDE AND EPINEPHRINE 10; 10 MG/ML; UG/ML
1.5 INJECTION, SOLUTION INFILTRATION; PERINEURAL ONCE
Status: COMPLETED | OUTPATIENT
Start: 2021-07-03 | End: 2021-07-03

## 2021-07-03 RX ORDER — IBUPROFEN 800 MG/1
800 TABLET ORAL
Qty: 30 TABLET | Refills: 0 | Status: SHIPPED | OUTPATIENT
Start: 2021-07-03

## 2021-07-03 RX ORDER — ACETAMINOPHEN 500 MG
1000 TABLET ORAL
Status: DISCONTINUED | OUTPATIENT
Start: 2021-07-03 | End: 2021-07-04 | Stop reason: HOSPADM

## 2021-07-03 RX ADMIN — BACITRACIN 1 PACKET: 500 OINTMENT TOPICAL at 20:54

## 2021-07-03 RX ADMIN — LIDOCAINE HYDROCHLORIDE,EPINEPHRINE BITARTRATE 15 MG: 10; .01 INJECTION, SOLUTION INFILTRATION; PERINEURAL at 20:53

## 2021-07-04 NOTE — ED PROVIDER NOTES
This is an 24-year-old female with a past medical history significant for dementia, therefore the branch block, hypertension, gait instability and frequent fall who presents to the ED by EMS with family at the bedside who state that the patient lost her balance and fell face forward landing on the floor with a complaint of head injury sustained during the fall. The patient is a very limited historian. Most of the history was obtained from the daughter who was at the bedside as per the EMS crew. The patient sustained a laceration to the forehead. She denies any chest pain, shortness of breath, nausea, vomiting, abdominal pain, dizziness, extremity weakness or numbness. Level state that the patient has had multiple stress incidents in the past and had a history of gait instability and is supposed to ambulate with a walker however is noncompliant with this request.           Past Medical History:   Diagnosis Date    Alzheimer's dementia without behavioral disturbance ([facility] [address] 8/3/2019    Essential hypertension     Family history of skin cancer     brother    LBBB (left bundle branch block) 8/3/2019    Radiation exposure     breast cancer left    Skin cancer        Past Surgical History:   Procedure Laterality Date    ME CARDIAC SURG PROCEDURE UNLIST       shunt         Family History:   Problem Relation Age of Onset    No Known Problems Mother     No Known Problems Father        Social History     Socioeconomic History    Marital status:      Spouse name: Not on file    Number of children: Not on file    Years of education: Not on file    Highest education level: Not on file   Occupational History    Not on file   Tobacco Use    Smoking status: Never Smoker    Smokeless tobacco: Never Used   Substance and Sexual Activity    Alcohol use:  Yes     Alcohol/week: 2.0 standard drinks     Types: 1 Cans of beer, 1 Glasses of wine per week    Drug use: Not on file    Sexual activity: Not on file Other Topics Concern    Not on file   Social History Narrative    Not on file     Social Determinants of Health     Financial Resource Strain:     Difficulty of Paying Living Expenses:    Food Insecurity:     Worried About Running Out of Food in the Last Year:     [address] in the Last Year:    Transportation Needs:     Lack of Transportation (Medical):  Lack of Transportation (Non-Medical):    Physical Activity:     Days of Exercise per Week:     Minutes of Exercise per Session:    Stress:     Feeling of Stress :    Social Connections:     Frequency of Communication with Friends and Family:     Frequency of Social Gatherings with Friends and Family:     Attends Taoist Services:     Active Member of Clubs or Organizations:     Attends Club or Organization Meetings:     Marital Status:    Intimate Partner Violence:     Fear of Current or Ex-Partner:     Emotionally Abused:     Physically Abused:     Sexually Abused: ALLERGIES: Codeine and Penicillins    Review of Systems   All other systems reviewed and are negative. Vitals:    07/03/21 2034   BP: (!) 183/90   Pulse: 100   Resp: 18   Temp: 97.3 °F (36.3 °C)   SpO2: 95%   Weight: 78.4 kg (172 lb 13.5 oz)            Physical Exam  Vitals and nursing note reviewed. Exam conducted with a chaperone present. CONSTITUTIONAL: Well-appearing; well-nourished; in no apparent distress  HEAD: Normocephalic; 2 cm laceration to the forehead  EYES: PERRL; EOM intact; conjunctiva and sclera are clear bilaterally. ENT: No rhinorrhea; normal pharynx with no tonsillar hypertrophy; mucous membranes pink/moist, no erythema, no exudate. NECK: Supple; non-tender; no cervical lymphadenopathy  CARD: Normal S1, S2; no murmurs, rubs, or gallops. Regular rate and rhythm. RESP: Normal respiratory effort; breath sounds clear and equal bilaterally; no wheezes, rhonchi, or rales.   ABD: Normal bowel sounds; non-distended; non-tender; no palpable organomegaly, no masses, no bruits. Back Exam: Normal inspection; no vertebral point tenderness, no CVA tenderness. Normal range of motion. EXT: Normal ROM in all four extremities; non-tender to palpation; no swelling or deformity; distal pulses are normal, no edema. SKIN: Warm; dry; no rash. NEURO:Alert and oriented x 3, coherent, ABDELRAHMAN-XII grossly intact, sensory and motor are non-focal.        MDM  Number of Diagnoses or Management Options  Fall, initial encounter  Injury of head, initial encounter  Laceration of forehead, initial encounter  Diagnosis management comments: Assessment: 79-year-old female who presents to the ED for evaluation status post mechanical fall with a head and neck injury and laceration to the forehead. She appears hemodynamically stable. She has no focal findings on exam.    Plan: CT scan of the head/CT scan of the cervical spine/wound care and laceration repair/  /Accu-Tiny/analgesia /education, reassurance, symptomatic treatment/ monitor and Reevaluate. Amount and/or Complexity of Data Reviewed  Clinical lab tests: ordered and reviewed  Tests in the radiology section of CPT®: ordered and reviewed  Tests in the medicine section of CPT®: reviewed and ordered  Discussion of test results with the performing providers: yes  Decide to obtain previous medical records or to obtain history from someone other than the patient: yes  Obtain history from someone other than the patient: yes  Review and summarize past medical records: yes  Discuss the patient with other providers: yes  Independent visualization of images, tracings, or specimens: yes    Risk of Complications, Morbidity, and/or Mortality  Presenting problems: moderate  Diagnostic procedures: moderate  Management options: moderate  General comments:  Total critical care time spent exclusive of procedures:  45 minutes    Patient Progress  Patient progress: stable         Wound Closure by Adhesive    Date/Time: 7/4/2021 3:16 AM  Performed by: Mukund Diaz MD  Authorized by: Mukund Diaz MD     Consent:     Consent obtained:  Verbal    Consent given by:  Patient    Risks discussed:  Infection and pain  Anesthesia (see MAR for exact dosages): Anesthesia method:  Topical application and local infiltration    Local anesthetic:  Lidocaine 1% WITH epi  Laceration details:     Location: forehead. Length (cm):  2    Laceration depth: Superficial.  Repair type:     Repair type: Intermediate  Pre-procedure details:     Preparation:  Patient was prepped and draped in usual sterile fashion and imaging obtained to evaluate for foreign bodies  Exploration:     Hemostasis achieved with:  Direct pressure    Wound exploration: wound explored through full range of motion and entire depth of wound probed and visualized      Contaminated: no    Treatment:     Area cleansed with:  Shur-Clens and saline    Amount of cleaning:  Standard    Irrigation solution:  Sterile saline    Irrigation method:  Syringe    Visualized foreign bodies/material removed: no    Skin repair:     Repair method:  Sutures    Suture size:  5-0    Suture material:  Prolene    Suture technique:  Running and simple interrupted    Number of sutures:  8  Approximation:     Approximation:  Close  Post-procedure details:     Dressing:  Antibiotic ointment and bulky dressing    Patient tolerance of procedure: Tolerated well, no immediate complications        Progress Note:   Pt has been reexamined by Bobby Rea MD. Pt is feeling much better. Symptoms have improved. All available results have been reviewed with pt and any available family. Pt understands sx, dx, and tx in ED. Care plan has been outlined and questions have been answered. Pt is ready to go home. Will send home on head injury and forehead laceration instruction. Tylenol and ibuprofen for pain as needed.  Outpatient referral with PCP 7 to 10 days for wound check and suture removal and further treatment as needed. Written by Edson Riggs MD,1:28 AM    .   .

## 2021-07-04 NOTE — ED TRIAGE NOTES
Pt has had a fall in the BR, with positive LOC. Pt has laceration to right forehead. Pt has a hx of dementia, daughter is a bedside.

## 2021-07-04 NOTE — ED NOTES
Pt head was wrapped and pt daughter given d/c instructions. Verbalized understanding.   Pt taken to car via w/c in stable condition

## 2021-07-14 ENCOUNTER — TELEPHONE (OUTPATIENT)
Dept: FAMILY MEDICINE CLINIC | Age: 81
End: 2021-07-14

## 2021-07-14 NOTE — TELEPHONE ENCOUNTER
[facility]   Phone:  [phone]      Fax:  [phone] [address]    Name:  Devan Brady  YOB: 1940    Incoming call from patient's daughter Devan Brady who is inquiring about [facility] services for her mother Devan Brady. She states patient was discharged from [facility] on 6/15/21. Dtr says patient has Alzheimers and balance issues with frequent falls. Pt was started on seroquel while in hospice and dtr says this is helping but pt's prescription is about to run out. Pt lives at [address] which is 12.5 miles from [facility] office. The current geographic radius for [facility] is 10 miles from [facility] office. This nurse explained that the [facility] team discusses new referrals at our weekly IDG meetings. The next meeting is scheduled for 7/21/21. This nurse will present the referral to the [facility] team on 7/21/21 and will call Ms. Estrella back to notify her of the decision. Since pt is about to run out of seroquel, this nurse suggested Ms. Estrella call pt's former PCP and neurologist to ask if they can do a virtual visit and prescribe the seroquel.       WILFRED CrabtreeN, RN-BC, [facility]  Referral Navigator, [facility]

## 2021-08-26 ENCOUNTER — TELEPHONE (OUTPATIENT)
Dept: NEUROLOGY | Age: 81
End: 2021-08-26

## 2021-09-10 ENCOUNTER — TELEPHONE (OUTPATIENT)
Dept: NEUROLOGY | Age: 81
End: 2021-09-10

## 2021-09-10 NOTE — TELEPHONE ENCOUNTER
Patient's daughter called back, patient scheduled to come in 09/29/2021 at 1:40pm. Patient will be coming in with her full time nurse.

## 2021-09-10 NOTE — TELEPHONE ENCOUNTER
----- Message from Pardeep Oconnor sent at 9/10/2021 12:28 PM EDT -----  Regarding: /telephone  General Message/Vendor Calls    Caller's first and last name: Ford Curran      Reason for call: Pt's daughter would to speak with  Dr.Edmonson      Callback required yes/no and why: yes to discuss with ,      Best contact number(s): [phone]      Details to clarify the request: Pt's daughter called to speak with Dr. Loida Felton regarding on the upcoming appointment and also has a question regarding the medication        Pardeep Shock

## 2021-09-29 ENCOUNTER — OFFICE VISIT (OUTPATIENT)
Dept: NEUROLOGY | Age: 81
End: 2021-09-29
Payer: MEDICARE

## 2021-09-29 VITALS
DIASTOLIC BLOOD PRESSURE: 84 MMHG | HEIGHT: 70 IN | HEART RATE: 102 BPM | WEIGHT: 163 LBS | OXYGEN SATURATION: 96 % | SYSTOLIC BLOOD PRESSURE: 128 MMHG | BODY MASS INDEX: 23.34 KG/M2

## 2021-09-29 DIAGNOSIS — F01.518 MIXED ALZHEIMER'S AND VASCULAR DEMENTIA WITH BEHAVIOR DISTURBANCES (HCC): Primary | ICD-10-CM

## 2021-09-29 DIAGNOSIS — F02.80 FRONTAL LOBE DEMENTIA (HCC): ICD-10-CM

## 2021-09-29 DIAGNOSIS — F02.818 MIXED ALZHEIMER'S AND VASCULAR DEMENTIA WITH BEHAVIOR DISTURBANCES (HCC): Primary | ICD-10-CM

## 2021-09-29 DIAGNOSIS — G31.09 FRONTAL LOBE DEMENTIA (HCC): ICD-10-CM

## 2021-09-29 DIAGNOSIS — G91.2 NPH (NORMAL PRESSURE HYDROCEPHALUS) (HCC): ICD-10-CM

## 2021-09-29 DIAGNOSIS — G30.9 MIXED ALZHEIMER'S AND VASCULAR DEMENTIA WITH BEHAVIOR DISTURBANCES (HCC): Primary | ICD-10-CM

## 2021-09-29 PROCEDURE — G9740 HOSP SRV TO PT DUR MSMT PER: HCPCS | Performed by: PSYCHIATRY & NEUROLOGY

## 2021-09-29 PROCEDURE — G8510 SCR DEP NEG, NO PLAN REQD: HCPCS | Performed by: PSYCHIATRY & NEUROLOGY

## 2021-09-29 PROCEDURE — G9693 PT USE HOSP DURING MSMT PER: HCPCS | Performed by: PSYCHIATRY & NEUROLOGY

## 2021-09-29 PROCEDURE — 99214 OFFICE O/P EST MOD 30 MIN: CPT | Performed by: PSYCHIATRY & NEUROLOGY

## 2021-09-29 PROCEDURE — G8420 CALC BMI NORM PARAMETERS: HCPCS | Performed by: PSYCHIATRY & NEUROLOGY

## 2021-09-29 PROCEDURE — G8427 DOCREV CUR MEDS BY ELIG CLIN: HCPCS | Performed by: PSYCHIATRY & NEUROLOGY

## 2021-09-29 PROCEDURE — G8536 NO DOC ELDER MAL SCRN: HCPCS | Performed by: PSYCHIATRY & NEUROLOGY

## 2021-09-29 PROCEDURE — G9718 HOSPICE ANYTIME MSMT PER: HCPCS | Performed by: PSYCHIATRY & NEUROLOGY

## 2021-09-29 RX ORDER — SERTRALINE HYDROCHLORIDE 100 MG/1
100 TABLET, FILM COATED ORAL
Qty: 90 TABLET | Refills: 2 | Status: SHIPPED | OUTPATIENT
Start: 2021-09-29

## 2021-09-29 RX ORDER — SERTRALINE HYDROCHLORIDE 50 MG/1
50 TABLET, FILM COATED ORAL
Qty: 90 TABLET | Refills: 2 | Status: SHIPPED | OUTPATIENT
Start: 2021-09-29

## 2021-09-29 RX ORDER — QUETIAPINE FUMARATE 25 MG/1
25 TABLET, FILM COATED ORAL 2 TIMES DAILY
Qty: 180 TABLET | Refills: 2 | Status: SHIPPED | OUTPATIENT
Start: 2021-09-29

## 2021-09-29 NOTE — PROGRESS NOTES
Chief Complaint   Patient presents with    Follow-up     Alzheimer's, [facility] with [facility] (caregiver and family friend) \" Abilio Sotomayor (daughter) sent over a message for Dr. Samanta Moreno to review in regards to getting mediction for behavior. \"       HPI    59-year-old woman following up. I see her for her history of dementia, Alzheimer's/NPH. I initially saw her 1 ago when she was still living independently since that time she now lives with her daughter full-time. Since I saw her last things have progressed such that we entered her into hospice however during that time she stabilized and no longer meets criteria for hospice. During that time Aricept and Namenda were discontinued. She only remains on Zoloft and Seroquel. Her behavior has become increasingly difficult such that she has become aggressive and combative at one point striking the caregiver. She is angry all the time. Very sedentary. She could sit in her chair all hours of the day unless she is prompted and assisted out of the seated position. She will sit in soiled clothing. No hallucinations reported. Her level of function is that she needs prompting with eating dressing and toileting. She tells me she does not live with her daughter. She tells me she does not see [facility] who is here who is her caregiver who sees her nearly every day. [facility] is reporting increased verbal and physical aggression. Sleep is okay. No wandering or roaming. Decreased food and water intake unless prompted.             Review of Systems   Unable to perform ROS: Dementia       Past Medical History:   Diagnosis Date    Alzheimer's dementia without behavioral disturbance ([facility] [address] 8/3/2019    Essential hypertension     Family history of skin cancer     brother    LBBB (left bundle branch block) 8/3/2019    Radiation exposure     breast cancer left    Skin cancer      Family History   Problem Relation Age of Onset    No Known Problems Mother     No Known Problems Father Social History     Socioeconomic History    Marital status:      Spouse name: Not on file    Number of children: Not on file    Years of education: Not on file    Highest education level: Not on file   Occupational History    Not on file   Tobacco Use    Smoking status: Never Smoker    Smokeless tobacco: Never Used   Vaping Use    Vaping Use: Never used   Substance and Sexual Activity    Alcohol use: Yes     Alcohol/week: 2.0 standard drinks     Types: 1 Glasses of wine, 1 Cans of beer per week    Drug use: Never    Sexual activity: Not on file   Other Topics Concern    Not on file   Social History Narrative    Not on file     Social Determinants of Health     Financial Resource Strain:     Difficulty of Paying Living Expenses:    Food Insecurity:     Worried About Running Out of Food in the Last Year:     [address] in the Last Year:    Transportation Needs:     Lack of Transportation (Medical):  Lack of Transportation (Non-Medical):    Physical Activity:     Days of Exercise per Week:     Minutes of Exercise per Session:    Stress:     Feeling of Stress :    Social Connections:     Frequency of Communication with Friends and Family:     Frequency of Social Gatherings with Friends and Family:     Attends Mandaeism Services:     Active Member of Clubs or Organizations:     Attends Club or Organization Meetings:     Marital Status:    Intimate Partner Violence:     Fear of Current or Ex-Partner:     Emotionally Abused:     Physically Abused:     Sexually Abused: Allergies   Allergen Reactions    Codeine Nausea Only    Penicillins Hives         Current Outpatient Medications   Medication Sig    sertraline (ZOLOFT) 100 mg tablet Take 1 Tablet by mouth nightly.  sertraline (ZOLOFT) 50 mg tablet Take 1 Tablet by mouth nightly. Total 150mg    QUEtiapine (SEROquel) 25 mg tablet Take 1 Tablet by mouth two (2) times a day.  Mid morning and evening times    ibuprofen (MOTRIN) 800 mg tablet Take 1 Tablet by mouth every eight (8) hours as needed for Pain.  psyllium husk (METAMUCIL PO) Take 12 g by mouth daily as needed (constipation). No current facility-administered medications for this visit. Neurologic Exam     Mental Status   Elderly woman awake and alert. Poor eye contact. Not very engaging. Answers with one-word phrases. She does not know the month or the year. Denies living with her daughter. She can spell ocean forward but not backward. Pupils are equal, face grossly symmetric  Speech limited to mostly single words  Moves her upper extremities easily wiping her nose. She can lift both legs while seated against gravity and extend briefly. No abnormal movements  Gait using a Rollator is wide and mildly shuffling       Visit Vitals  /84 (BP 1 Location: Left upper arm, BP Patient Position: Sitting)   Pulse (!) 102   Ht 5' 10\" (1.778 m)   Wt 163 lb (73.9 kg)   SpO2 96%   BMI 23.39 kg/m²       Assessment and Plan   Diagnoses and all orders for this visit:    1. 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 NPH (normal pressure hydrocephalus) ([address]    3. Frontal lobe dementia ([address]    Other orders  -     sertraline (ZOLOFT) 100 mg tablet; Take 1 Tablet by mouth nightly. -     sertraline (ZOLOFT) 50 mg tablet; Take 1 Tablet by mouth nightly. Total 150mg  -     QUEtiapine (SEROquel) 25 mg tablet; Take 1 Tablet by mouth two (2) times a day. Mid morning and evening times      42-year-old woman with dementia that is continuing to progress now with a rather significant FTD component. Notable behavioral changes getting worse or safety has become an issue. She has gotten physically aggressive with her caregiver. In conversations with her daughter, it sounds like family is looking for a memory unit which I think is reasonable in this case because we want to advocate for more quality time.   I am not going to resume Aricept or Namenda given the recent hospice status and anticipate she may need to go back into hospice particularly if her oral intake continues to decline. I will increase Zoloft to 150 daily to help with mood and also increase Seroquel to a standing 25 mg twice daily midmorning and in the evening. I discussed with the caregiver that Seroquel does hold a black box warning for stroke in patients with dementia however we use this frequently for behavior control and safety purposes. Agree with memory unit placement once available to do so. Frequent redirection as possible. I would like to see her at her next visit. Call with any questions. 30 minutes of time was spent in total today reviewing the medical record, face-to-face time with the patient and caregiver getting collateral information, and time completing documentation. This clinical note was dictated with an electronic dictation software that can make unintentional errors. If there are any questions, please contact me directly for clarification.       [address] [facility], [facility] [address]  Diplomate SHERIF

## 2021-09-29 NOTE — PROGRESS NOTES
Chief Complaint   Patient presents with    Follow-up     Alzheimer's, here with Santa Espinoza (caregiver and family friend) \" Edi Luciano (daughter) sent over a message for Dr. Sharda Badillo to review in regards to getting mediction for behavior. \"     Visit Vitals  /84 (BP 1 Location: Left upper arm, BP Patient Position: Sitting)   Pulse (!) 102   Ht 5' 10\" (1.778 m)   Wt 163 lb (73.9 kg)   SpO2 96%   BMI 23.39 kg/m²

## 2021-10-05 ENCOUNTER — TELEPHONE (OUTPATIENT)
Dept: NEUROLOGY | Age: 81
End: 2021-10-05

## 2021-10-05 NOTE — TELEPHONE ENCOUNTER
----- Message from Divina Espino sent at 10/4/2021  4:40 PM EDT -----  Regarding: /telephone  General Message/Vendor Calls    Caller's first and last name: Eneida Terrell( [facility])      Reason for call: Pre- authorization medication       Callback required yes/no and why:yes to speak with Dr. Morrison Bosworth contact number(s):[phone]        Details to clarify the request:BETZY Kam( [facility]) called to  speak with Dr. Jolie Nunez, regarding on faxing over a pre- authorization medication of \"QUEtiapine (SEROquel) 25 mg tablet\" to the pt's pharmacy.     Fax number:   [phone]           Divina Espino

## 2021-10-06 NOTE — TELEPHONE ENCOUNTER
----- Message from Kumar Solo sent at 10/6/2021  8:28 AM EDT -----  Regarding: /Telephone  General Message/Vendor Calls    Caller's first and last name: Jalen Scott (Daughter)      Reason for call: Important med questions      Callback required yes/no and why: Yes the patients daughter has important questions about the QUEtiapine (SEROquel) 25 mg tablet [[id number]]       Best contact number(s): [phone]      Details to clarify the request: The medicine has a black box warning and the doctor will need to contact the insurance company. They have to talk to the doctor before filling the prescription for (SEROquel) 25 mg tablet. They have one pill left and can't go cold turkey.        Kuamr Solankur

## 2021-10-06 NOTE — TELEPHONE ENCOUNTER
Glo Padilla, can you please check on the PA issue? I have filled this medication before and the family is aware of the black box warning. I would recommend they try to purchase the medication with good Rx if possible. Unfortunately the insurance company is becoming a barrier to her care.

## 2021-10-08 NOTE — TELEPHONE ENCOUNTER
Re: Seroquel    Called pharmacy to see why pt is having issues, they advised bc pt has dementia that the insurance wanted provider to call [phone] to advise why pt is taking meds. [facility]   Awaiting update.

## 2021-11-12 ENCOUNTER — OFFICE VISIT (OUTPATIENT)
Dept: NEUROLOGY | Age: 81
End: 2021-11-12
Payer: MEDICARE

## 2021-11-12 VITALS
BODY MASS INDEX: 23.39 KG/M2 | OXYGEN SATURATION: 97 % | DIASTOLIC BLOOD PRESSURE: 88 MMHG | SYSTOLIC BLOOD PRESSURE: 128 MMHG | HEIGHT: 70 IN | HEART RATE: 100 BPM

## 2021-11-12 DIAGNOSIS — G30.9 MIXED ALZHEIMER'S AND VASCULAR DEMENTIA WITH BEHAVIOR DISTURBANCES (HCC): Primary | ICD-10-CM

## 2021-11-12 DIAGNOSIS — G31.09 FRONTAL LOBE DEMENTIA (HCC): ICD-10-CM

## 2021-11-12 DIAGNOSIS — F02.80 FRONTAL LOBE DEMENTIA (HCC): ICD-10-CM

## 2021-11-12 DIAGNOSIS — F02.818 MIXED ALZHEIMER'S AND VASCULAR DEMENTIA WITH BEHAVIOR DISTURBANCES (HCC): Primary | ICD-10-CM

## 2021-11-12 DIAGNOSIS — G91.2 NPH (NORMAL PRESSURE HYDROCEPHALUS) (HCC): ICD-10-CM

## 2021-11-12 DIAGNOSIS — F01.518 MIXED ALZHEIMER'S AND VASCULAR DEMENTIA WITH BEHAVIOR DISTURBANCES (HCC): Primary | ICD-10-CM

## 2021-11-12 PROCEDURE — 99214 OFFICE O/P EST MOD 30 MIN: CPT | Performed by: PSYCHIATRY & NEUROLOGY

## 2021-11-12 PROCEDURE — G9718 HOSPICE ANYTIME MSMT PER: HCPCS | Performed by: PSYCHIATRY & NEUROLOGY

## 2021-11-12 PROCEDURE — G9693 PT USE HOSP DURING MSMT PER: HCPCS | Performed by: PSYCHIATRY & NEUROLOGY

## 2021-11-12 PROCEDURE — G8427 DOCREV CUR MEDS BY ELIG CLIN: HCPCS | Performed by: PSYCHIATRY & NEUROLOGY

## 2021-11-12 PROCEDURE — G8510 SCR DEP NEG, NO PLAN REQD: HCPCS | Performed by: PSYCHIATRY & NEUROLOGY

## 2021-11-12 PROCEDURE — G8536 NO DOC ELDER MAL SCRN: HCPCS | Performed by: PSYCHIATRY & NEUROLOGY

## 2021-11-12 PROCEDURE — G8420 CALC BMI NORM PARAMETERS: HCPCS | Performed by: PSYCHIATRY & NEUROLOGY

## 2021-11-12 PROCEDURE — G9740 HOSP SRV TO PT DUR MSMT PER: HCPCS | Performed by: PSYCHIATRY & NEUROLOGY

## 2021-11-12 RX ORDER — LOSARTAN POTASSIUM 50 MG/1
TABLET ORAL
COMMUNITY
Start: 2021-11-07

## 2021-11-12 NOTE — PROGRESS NOTES
Chief Complaint   Patient presents with    Follow-up     Alzheimer's disease, here with her daughter       HPI    26-year-old woman following up. I see her for her history of dementia, Alzheimer's/NPH. When I saw her last there were some issues with behavior and self control. We made some changes consisting of increasing Zoloft to 150 mg and adding Seroquel twice daily standing dose. Fortunately there has been good improvement. She is interacting well with her daughter. No aggression. Mood is more stable albeit she does have days of depression. She does not do much physical activity and is very sedentary but does have a walker. She eats sufficiently but could do better according to the daughter. Drinking water is challenging at times but she does enjoy her can of Coke daily. Sleep is okay. No roaming. Review of Systems   Unable to perform ROS: Dementia       Past Medical History:   Diagnosis Date    Alzheimer's dementia without behavioral disturbance ([facility] [address] 8/3/2019    Essential hypertension     Family history of skin cancer     brother    LBBB (left bundle branch block) 8/3/2019    Radiation exposure     breast cancer left    Skin cancer      Family History   Problem Relation Age of Onset    No Known Problems Mother     No Known Problems Father      Social History     Socioeconomic History    Marital status:      Spouse name: Not on file    Number of children: Not on file    Years of education: Not on file    Highest education level: Not on file   Occupational History    Not on file   Tobacco Use    Smoking status: Never Smoker    Smokeless tobacco: Never Used   Vaping Use    Vaping Use: Never used   Substance and Sexual Activity    Alcohol use:  Yes     Alcohol/week: 2.0 standard drinks     Types: 1 Glasses of wine, 1 Cans of beer per week    Drug use: Never    Sexual activity: Not on file   Other Topics Concern    Not on file   Social History Narrative    Not on file     Social Determinants of Health     Financial Resource Strain:     Difficulty of Paying Living Expenses: Not on file   Food Insecurity:     Worried About Running Out of Food in the Last Year: Not on file    Ritu of Food in the Last Year: Not on file   Transportation Needs:     Lack of Transportation (Medical): Not on file    Lack of Transportation (Non-Medical): Not on file   Physical Activity:     Days of Exercise per Week: Not on file    Minutes of Exercise per Session: Not on file   Stress:     Feeling of Stress : Not on file   Social Connections:     Frequency of Communication with Friends and Family: Not on file    Frequency of Social Gatherings with Friends and Family: Not on file    Attends Hinduism Services: Not on file    Active Member of [address] [place] [address] [employer] or Organizations: Not on file    Attends Club or Organization Meetings: Not on file    Marital Status: Not on file   Intimate Partner Violence:     Fear of Current or Ex-Partner: Not on file    Emotionally Abused: Not on file    Physically Abused: Not on file    Sexually Abused: Not on file   Housing Stability:     Unable to Pay for Housing in the Last Year: Not on file    Number of [place] in the Last Year: Not on file    Unstable Housing in the Last Year: Not on file     Allergies   Allergen Reactions    Codeine Nausea Only    Penicillins Hives         Current Outpatient Medications   Medication Sig    losartan (COZAAR) 50 mg tablet     sertraline (ZOLOFT) 100 mg tablet Take 1 Tablet by mouth nightly.  sertraline (ZOLOFT) 50 mg tablet Take 1 Tablet by mouth nightly. Total 150mg    QUEtiapine (SEROquel) 25 mg tablet Take 1 Tablet by mouth two (2) times a day. Mid morning and evening times    ibuprofen (MOTRIN) 800 mg tablet Take 1 Tablet by mouth every eight (8) hours as needed for Pain.  psyllium husk (METAMUCIL PO) Take 12 g by mouth daily as needed (constipation).      No current facility-administered medications for this visit. Neurologic Exam     Mental Status   Elderly woman awake and alert. Appropriate eye contact. Not very engaging, but does answer questions with short phrases. She does not know the month or the year. She is very calm. Pupils are equal, face grossly symmetric, tongue is midline without atrophy or discoloration  Speech limited to mostly single words with baseline tone  Extremity strength I would grade 5/5 seated in the wheelchair. Good effort. No abnormal movements  Gait deferred-wheelchair today       Visit Vitals  /88 (BP 1 Location: Left upper arm, BP Patient Position: Sitting)   Pulse 100   Ht 5' 10\" (1.778 m)   SpO2 97%   BMI 23.39 kg/m²       Assessment and Plan   Diagnoses and all orders for this visit:    1. 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 Frontal lobe dementia ([facility] [address]    3. NPH (normal pressure hydrocephalus) [facility])         79year-old woman with dementia likely mixed presentation Alzheimer's/vascular with an FTD component. Since her last visit the medication changes have been beneficial with better control of her symptoms in general.  Today in the office she is very easy to communicate with and she is following commands. I understand there is plans to transition to long-term care which I think is appropriate as I anticipate her condition will worsen over time such that she is going to need continued 24/7 care. I am not making any medication changes today since everything seems stable. I would like to see her in about 6-9 months. I spoke with the daughter separately and discussed the plan with her and with the patient face-to-face. No changes to medications. 30 minutes of time was spent in total today reviewing the medical record, face-to-face time with the patient and caregiver getting collateral information, and time completing documentation.     This clinical note was dictated with an electronic dictation software that can make unintentional errors. If there are any questions, please contact me directly for clarification.       [address] [facility] [address]  Diplomate ABPN

## 2021-11-12 NOTE — PROGRESS NOTES
Chief Complaint   Patient presents with    Follow-up     Alzheimer's disease, here with her daughter

## 2022-03-18 PROBLEM — Z51.5 HOSPICE CARE PATIENT: Status: ACTIVE | Noted: 2021-03-17

## 2022-03-19 PROBLEM — F02.80 ALZHEIMER'S DEMENTIA WITHOUT BEHAVIORAL DISTURBANCE (HCC): Status: ACTIVE | Noted: 2019-08-03

## 2022-03-19 PROBLEM — R55 NEAR SYNCOPE: Status: ACTIVE | Noted: 2019-08-03

## 2022-03-19 PROBLEM — G30.9 ALZHEIMER'S DEMENTIA WITHOUT BEHAVIORAL DISTURBANCE (HCC): Status: ACTIVE | Noted: 2019-08-03

## 2022-03-19 PROBLEM — R01.1 SYSTOLIC MURMUR: Status: ACTIVE | Noted: 2019-08-03

## 2022-03-20 PROBLEM — I10 HYPERTENSION, ESSENTIAL: Status: ACTIVE | Noted: 2019-08-03

## 2022-03-20 PROBLEM — I44.7 LBBB (LEFT BUNDLE BRANCH BLOCK): Status: ACTIVE | Noted: 2019-08-03

## 2022-03-24 ENCOUNTER — APPOINTMENT (OUTPATIENT)
Dept: GENERAL RADIOLOGY | Age: 82
End: 2022-03-24
Attending: EMERGENCY MEDICINE
Payer: MEDICARE

## 2022-03-24 ENCOUNTER — HOSPITAL ENCOUNTER (EMERGENCY)
Age: 82
Discharge: OTHER INSTITUTION | End: 2022-03-25
Attending: EMERGENCY MEDICINE
Payer: MEDICARE

## 2022-03-24 ENCOUNTER — APPOINTMENT (OUTPATIENT)
Dept: CT IMAGING | Age: 82
End: 2022-03-24
Attending: EMERGENCY MEDICINE
Payer: MEDICARE
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
## 2022-03-24 DIAGNOSIS — S01.01XA LACERATION OF SCALP, INITIAL ENCOUNTER: ICD-10-CM

## 2022-03-24 DIAGNOSIS — S42.034A CLOSED NONDISPLACED FRACTURE OF ACROMIAL END OF RIGHT CLAVICLE, INITIAL ENCOUNTER: Primary | ICD-10-CM

## 2022-03-24 PROCEDURE — 72125 CT NECK SPINE W/O DYE: CPT

## 2022-03-24 PROCEDURE — 73110 X-RAY EXAM OF WRIST: CPT

## 2022-03-24 PROCEDURE — 73060 X-RAY EXAM OF HUMERUS: CPT

## 2022-03-24 PROCEDURE — 73030 X-RAY EXAM OF SHOULDER: CPT

## 2022-03-24 PROCEDURE — 93005 ELECTROCARDIOGRAM TRACING: CPT

## 2022-03-24 PROCEDURE — 70450 CT HEAD/BRAIN W/O DYE: CPT

## 2022-03-24 PROCEDURE — 99285 EMERGENCY DEPT VISIT HI MDM: CPT

## 2022-03-25 ENCOUNTER — APPOINTMENT (OUTPATIENT)
Dept: GENERAL RADIOLOGY | Age: 82
End: 2022-03-25
Attending: EMERGENCY MEDICINE
Payer: MEDICARE

## 2022-03-25 VITALS
WEIGHT: 165.79 LBS | HEART RATE: 84 BPM | OXYGEN SATURATION: 93 % | SYSTOLIC BLOOD PRESSURE: 188 MMHG | DIASTOLIC BLOOD PRESSURE: 76 MMHG | RESPIRATION RATE: 20 BRPM | BODY MASS INDEX: 24.56 KG/M2 | HEIGHT: 69 IN

## 2022-03-25 LAB
APPEARANCE UR: ABNORMAL
BACTERIA URNS QL MICRO: ABNORMAL /HPF
BILIRUB UR QL: NEGATIVE
COLOR UR: ABNORMAL
EPITH CASTS URNS QL MICRO: ABNORMAL /LPF
GLUCOSE UR STRIP.AUTO-MCNC: NEGATIVE MG/DL
HGB UR QL STRIP: NEGATIVE
KETONES UR QL STRIP.AUTO: NEGATIVE MG/DL
LEUKOCYTE ESTERASE UR QL STRIP.AUTO: ABNORMAL
NITRITE UR QL STRIP.AUTO: NEGATIVE
PH UR STRIP: 5 [PH] (ref 5–8)
PROT UR STRIP-MCNC: ABNORMAL MG/DL
RBC #/AREA URNS HPF: ABNORMAL /HPF (ref 0–5)
SP GR UR REFRACTOMETRY: 1.02 (ref 1–1.03)
UA: UC IF INDICATED,UAUC: ABNORMAL
UROBILINOGEN UR QL STRIP.AUTO: 0.2 EU/DL (ref 0.2–1)
WBC URNS QL MICRO: ABNORMAL /HPF (ref 0–4)

## 2022-03-25 PROCEDURE — 71045 X-RAY EXAM CHEST 1 VIEW: CPT

## 2022-03-25 PROCEDURE — 81001 URINALYSIS AUTO W/SCOPE: CPT

## 2022-03-25 RX ORDER — HYDROCODONE BITARTRATE AND ACETAMINOPHEN 7.5; 325 MG/1; MG/1
1 TABLET ORAL
Qty: 12 TABLET | Refills: 0 | Status: SHIPPED | OUTPATIENT
Start: 2022-03-25 | End: 2022-03-28

## 2022-03-25 NOTE — DISCHARGE INSTRUCTIONS
It was a pleasure taking care of you in our Emergency Department today. We know that when you come to [facility] you are entrusting us with your health, comfort, and safety. Our physicians and nurses honor that trust, and truly appreciate the opportunity to care for you and your loved ones. We also value your feedback. If you receive a survey about your Emergency Department experience today, please fill it out. We care about our patients' feedback, and we listen to what you have to say. Thank you!       Dr. Abilio Rosas MD.

## 2022-03-25 NOTE — ED PROVIDER NOTES
EMERGENCY DEPARTMENT HISTORY AND PHYSICAL EXAM     ------------------------------------------------------------------------------------------------------  Please note that this dictation was completed with Master[employer] 3D, the [employer] voice recognition software. Quite often unanticipated grammatical, syntax, homophones, and other interpretive errors are inadvertently transcribed by the computer software. Please disregard these errors. Please excuse any errors that have escaped final proofreading.  -----------------------------------------------------------------------------------------------------------------    Date: 3/24/2022  Patient Name: Cloyde Schlatter    History of Presenting Illness     Chief Complaint   Patient presents with   [place] Fall     Patient arrives via EMS from [facility]. Patient  A&O x 0 at baseline       History Provided By: EMS    HPI: Cloyde Schlatter is a 79 y.o. female, with significant pmhx of Alzheimer's dementia without behavioral disturbance, left bundle branch block, hypertension, who presents via EMS to the ED with report of unwitnessed fall. Patient noted to have hematoma and bleeding to the right side of her head and complaint of right shoulder pain. Patient is disoriented at baseline per EMS. Patient is awake and can state her name and birthday. No other HPI information could be obtained at this time due to patient's baseline dementia  PCP: Unknown, Provider, DPM    Social Hx: denies tobacco, denies EtOH, denies recreational/ Illicit Drugs     There are no other complaints, changes, or physical findings at this time. Allergies   Allergen Reactions    Codeine Nausea Only    Penicillins Hives         Current Outpatient Medications   Medication Sig Dispense Refill    HYDROcodone-acetaminophen (NORCO) 7.5-325 mg per tablet Take 1 Tablet by mouth every six (6) hours as needed for Pain for up to 3 days. Max Daily Amount: 4 Tablets.  12 Tablet 0    losartan (COZAAR) 50 mg tablet       sertraline (ZOLOFT) 100 mg tablet Take 1 Tablet by mouth nightly. 90 Tablet 2    sertraline (ZOLOFT) 50 mg tablet Take 1 Tablet by mouth nightly. Total 150mg 90 Tablet 2    QUEtiapine (SEROquel) 25 mg tablet Take 1 Tablet by mouth two (2) times a day. Mid morning and evening times 180 Tablet 2    ibuprofen (MOTRIN) 800 mg tablet Take 1 Tablet by mouth every eight (8) hours as needed for Pain. 30 Tablet 0    psyllium husk (METAMUCIL PO) Take 12 g by mouth daily as needed (constipation). Past History     Past Medical History:  Past Medical History:   Diagnosis Date    Alzheimer's dementia without behavioral disturbance ([facility] [address] 8/3/2019    Essential hypertension     Family history of skin cancer     brother    LBBB (left bundle branch block) 8/3/2019    Radiation exposure     breast cancer left    Skin cancer        Past Surgical History:  Past Surgical History:   Procedure Laterality Date    [place] CARDIAC SURG PROCEDURE UNLIST       shunt       Family History:  Family History   Problem Relation Age of Onset    No Known Problems Mother     No Known Problems Father        Social History:  Social History     Tobacco Use    Smoking status: Never Smoker    Smokeless tobacco: Never Used   Vaping Use    Vaping Use: Never used   Substance Use Topics    Alcohol use: Yes     Alcohol/week: 2.0 standard drinks     Types: 1 Glasses of wine, 1 Cans of beer per week     Comment: not much    Drug use: Never       Allergies: Allergies   Allergen Reactions    Codeine Nausea Only    Penicillins Hives         Review of Systems   Review of Systems   Unable to perform ROS: Dementia       Physical Exam   Physical Exam  Vitals and nursing note reviewed. Constitutional:       General: She is not in acute distress. Appearance: She is well-developed. She is not diaphoretic. HENT:      Head: Normocephalic. Abrasion and contusion present.         Nose: Nose normal.   Eyes:      General: No scleral icterus. Conjunctiva/sclera: Conjunctivae normal.   Neck:      Trachea: No tracheal deviation. Cardiovascular:      Rate and Rhythm: Normal rate and regular rhythm. Heart sounds: Normal heart sounds. No murmur heard. No friction rub. Pulmonary:      Effort: Pulmonary effort is normal. No respiratory distress. Breath sounds: Normal breath sounds. No stridor. No wheezing or rales. Abdominal:      General: Bowel sounds are normal. There is no distension. Palpations: Abdomen is soft. Tenderness: There is no abdominal tenderness. There is no rebound. Musculoskeletal:         General: Normal range of motion. Right shoulder: Tenderness and bony tenderness present. Right upper arm: Tenderness present. Right wrist: Tenderness present. Arms:       Cervical back: Full passive range of motion without pain and normal range of motion. No crepitus. No pain with movement, spinous process tenderness or muscular tenderness. Skin:     General: Skin is warm and dry. Findings: No rash. Neurological:      Mental Status: She is alert and oriented to person, place, and time. Cranial Nerves: No cranial nerve deficit. Psychiatric:         Speech: Speech normal.         Behavior: Behavior normal.         Thought Content:  Thought content normal.         Judgment: Judgment normal.           Diagnostic Study Results     Labs -     Recent Results (from the past 12 hour(s))   EKG, 12 LEAD, INITIAL    Collection Time: 03/24/22 10:14 PM   Result Value Ref Range    Ventricular Rate 85 BPM    Atrial Rate 85 BPM    P-R Interval 206 ms    QRS Duration 166 ms    Q-T Interval 444 ms    QTC Calculation (Bezet) 528 ms    Calculated P Axis 75 degrees    Calculated R Axis -11 degrees    Calculated T Axis 85 degrees    Diagnosis       Sinus rhythm with premature atrial complexes with aberrant conduction  Left bundle branch block  When compared with ECG of 14-JAN-2021 19:35,  aberrant conduction is now present     URINALYSIS W/ REFLEX CULTURE    Collection Time: 03/25/22 12:20 AM    Specimen: Urine   Result Value Ref Range    Color YELLOW/STRAW      Appearance CLOUDY (A) CLEAR      Specific gravity 1.021 1.003 - 1.030      pH (UA) 5.0 5.0 - 8.0      Protein TRACE (A) NEG mg/dL    Glucose Negative NEG mg/dL    Ketone Negative NEG mg/dL    Bilirubin Negative NEG      Blood Negative NEG      Urobilinogen 0.2 0.2 - 1.0 EU/dL    Nitrites Negative NEG      Leukocyte Esterase SMALL (A) NEG      WBC 0-4 0 - 4 /hpf    RBC 0-5 0 - 5 /hpf    Epithelial cells FEW FEW /lpf    Bacteria 4+ (A) NEG /hpf    UA:UC IF INDICATED CULTURE NOT INDICATED BY UA RESULT CNI         Radiologic Studies -   XR CHEST PORT   Final Result   No evidence of acute cardiopulmonary process. Low lung volumes. XR HUMERUS RT   Final Result   No acute abnormality. XR WRIST RT AP/LAT/OBL MIN 3V   Final Result   No acute abnormality. XR SHOULDER RT AP/LAT MIN 2 V   Final Result   Mildly displaced distal right clavicle fracture. CT HEAD WO CONT   Final Result   No acute intracranial process. Unchanged ventriculomegaly, with ventriculostomy   shunt in stable position. Right frontal extracranial soft tissue swelling. CT SPINE CERV WO CONT   Final Result   No acute fracture. Unchanged grade 1 anterolisthesis at C4-5 and C7-T1. Multilevel degenerative changes. Patchy groundglass opacities at both lung   apices may represent pulmonary edema or atypical pneumonia. CT Results  (Last 48 hours)               03/24/22 2249  CT HEAD WO CONT Final result    Impression:  No acute intracranial process. Unchanged ventriculomegaly, with ventriculostomy   shunt in stable position. Right frontal extracranial soft tissue swelling. Narrative:  EXAM: CT HEAD WO CONT       INDICATION: fall       COMPARISON: 7/3/2021. CONTRAST: None.        TECHNIQUE: Unenhanced CT of the head was performed using 5 mm images. Brain and   bone windows were generated. Coronal and sagittal reformats. CT dose reduction   was achieved through use of a standardized protocol tailored for this   examination and automatic exposure control for dose modulation. FINDINGS:   There is unchanged ventriculomegaly. Right posterior ventriculostomy catheter is   stable in position. There is unchanged periventricular white matter disease. There is no intracranial hemorrhage, extra-axial collection, or mass effect. The   basilar cisterns are open. No CT evidence of acute infarct. The bone windows demonstrate no abnormalities. The visualized portions of the   paranasal sinuses and mastoid air cells are clear. There is right frontal   extracranial soft tissue swelling.           03/24/22 2249  CT SPINE CERV WO CONT Final result    Impression:  No acute fracture. Unchanged grade 1 anterolisthesis at C4-5 and C7-T1. Multilevel degenerative changes. Patchy groundglass opacities at both lung   apices may represent pulmonary edema or atypical pneumonia. Narrative:  EXAM:  CT CERVICAL SPINE WITHOUT CONTRAST       INDICATION: fall. COMPARISON: July 3, 2021       CONTRAST:  None. TECHNIQUE: Multislice helical CT of the cervical spine was performed without   intravenous contrast administration. Sagittal and coronal reformats were   generated. CT dose reduction was achieved through use of a standardized   protocol tailored for this examination and automatic exposure control for dose   modulation. FINDINGS:       Grade 1 anterolisthesis at C4-5 by approximately 3 mm and at C7-T1 by   approximately 2 mm is unchanged. There is no fracture or compression deformity. The odontoid process is intact. The craniocervical junction is within normal   limits. Multilevel degenerative disc disease and multilevel bilateral facet   arthropathy are again noted. No significant spinal canal stenosis is evident. Left-sided neural foraminal stenosis at C3-4 is unchanged. The incidentally imaged soft tissues are within normal limits. Patchy   groundglass opacities are noted at both lung apices. CXR Results  (Last 48 hours)               03/25/22 0010  XR CHEST PORT Final result    Impression:  No evidence of acute cardiopulmonary process. Low lung volumes. Narrative:  INDICATION: Shortness of breath       FINDINGS: AP portable imaging of the chest performed at 12:02 AM demonstrates a   normal cardiomediastinal silhouette. Lung volumes are low. There is no focal   airspace disease or pleural effusion. No significant osseous abnormalities are   seen. Medical Decision Making   I am the first provider for this patient. I reviewed the vital signs, available nursing notes, past medical history, past surgical history, family history and social history. Vital Signs-Reviewed the patient's vital signs. Patient Vitals for the past 12 hrs:   Pulse Resp BP SpO2   03/24/22 2209 84 20 (!) 188/76 93 %   03/24/22 2203 -- -- -- 94 %   03/24/22 2154 91 21 (!) 192/87 95 %   03/24/22 2151 93 22 (!) 192/87 95 %       Pulse Oximetry Analysis - 93% on RA Normal    Records Reviewed/Interpretted: Nursing Notes from triage and Old Medical Records, noting previous neurology visits for Alzheimer and vascular dementia    Provider Notes (Medical Decision Making):     DDX:  Closed head injury, scalp laceration, C-spine injury, intracranial bleed, shoulder injury/fracture, humerus or wrist fracture    Plan:  CT head and neck, shoulder/humerus/wrist x-rays, analgesic, UA    Impression:  Close head injury, scalp skin tear, clavicle fracture    ED Course:   Initial assessment performed. The patients presenting problems have been discussed, and they are in agreement with the care plan formulated and outlined with them. I have encouraged them to ask questions as they arise throughout their visit.     I reviewed our electronic medical record system for any past medical records that were available that may contribute to the patients current condition, the nursing notes and and vital signs from today's visit  Nursing notes will be reviewed as they become available in realtime while the pt has been in the ED. Violette Luna MD      I personally reviewed/interpreted pt's imaging. Agree with official read by radiology as noted above. Violette Luna MD      Progress note:  Pt noted to be feeling better, ready for discharge. Discussed lab and imaging findings with pt, specifically noting no intracranial bleed, noted clavicle fracture. Pt will follow up with primary care as instructed. All questions have been answered, pt voiced understanding and agreement with plan. Specific return precautions provided in addition to instructions for pt to return to the ED immediately should sx worsen at any time. Violette Luna MD             Critical Care Time:     none      Diagnosis     Clinical Impression:   1. Closed nondisplaced fracture of acromial end of right clavicle, initial encounter    2. Fall, initial encounter    3. Laceration of scalp, initial encounter        PLAN:  1. Discharge Medication List as of 3/25/2022  2:30 AM      START taking these medications    Details   HYDROcodone-acetaminophen (NORCO) 7.5-325 mg per tablet Take 1 Tablet by mouth every six (6) hours as needed for Pain for up to 3 days. Max Daily Amount: 4 Tablets., Normal, Disp-12 Tablet, R-0         CONTINUE these medications which have NOT CHANGED    Details   losartan (COZAAR) 50 mg tablet Historical Med      !! sertraline (ZOLOFT) 100 mg tablet Take 1 Tablet by mouth nightly., Normal, Disp-90 Tablet, R-2      !! sertraline (ZOLOFT) 50 mg tablet Take 1 Tablet by mouth nightly. Total 150mg, Normal, Disp-90 Tablet, R-2      QUEtiapine (SEROquel) 25 mg tablet Take 1 Tablet by mouth two (2) times a day.  Mid morning and evening times, Normal, Disp-180 Tablet, R-2      ibuprofen (MOTRIN) 800 mg tablet Take 1 Tablet by mouth every eight (8) hours as needed for Pain., Print, Disp-30 Tablet, R-0      psyllium husk (METAMUCIL PO) Take 12 g by mouth daily as needed (constipation). , Historical Med       !! - Potential duplicate medications found. Please discuss with provider. 2.   Follow-up Information     Follow up With Specialties Details Why Contact Info    [facility] EMERGENCY DEPT Emergency Medicine  As needed [address] [facility]  [address] [facility]  [phone]        Return to ED if worse     Disposition:  The patient's results have been reviewed with family and/or caregiver. They verbally convey their understanding and agreement of the patient's signs, symptoms, diagnosis, treatment and prognosis and additionally agree to follow up as recommended in the discharge instructions or to return to the Emergency Room should the patient's condition change prior to their follow-up appointment. The family and/or caregiver verbally agrees with the care-plan and all of their questions have been answered. The discharge instructions have also been provided to the them with educational information regarding the patient's diagnosis as well a list of reasons why the patient would want to return to the ER prior to their follow-up appointment should their condition change.   Maritza Lipscomb MD

## 2022-03-25 NOTE — ED NOTES
Report called to [address] [facility] [facility]. Report taken by JIAN Yusuf.

## 2022-03-26 LAB
ATRIAL RATE: 85 BPM
CALCULATED P AXIS, ECG09: 75 DEGREES
CALCULATED R AXIS, ECG10: -11 DEGREES
CALCULATED T AXIS, ECG11: 85 DEGREES
DIAGNOSIS, 93000: NORMAL
P-R INTERVAL, ECG05: 206 MS
Q-T INTERVAL, ECG07: 444 MS
QRS DURATION, ECG06: 166 MS
QTC CALCULATION (BEZET), ECG08: 528 MS
VENTRICULAR RATE, ECG03: 85 BPM

## 2023-11-09 ENCOUNTER — APPOINTMENT (OUTPATIENT)
Facility: HOSPITAL | Age: 83
End: 2023-11-09
Payer: MEDICARE

## 2023-11-09 ENCOUNTER — HOSPITAL ENCOUNTER (INPATIENT)
Facility: HOSPITAL | Age: 83
LOS: 2 days | Discharge: NURSING, REHAB OR LONG-TERM CARE | End: 2023-11-11
Attending: EMERGENCY MEDICINE | Admitting: STUDENT IN AN ORGANIZED HEALTH CARE EDUCATION/TRAINING PROGRAM
Payer: MEDICARE

## 2023-11-09 DIAGNOSIS — I26.94 MULTIPLE SUBSEGMENTAL PULMONARY EMBOLI WITHOUT ACUTE COR PULMONALE (HCC): Primary | ICD-10-CM

## 2023-11-09 PROBLEM — I26.99 BILATERAL PULMONARY EMBOLISM (HCC): Status: ACTIVE | Noted: 2023-11-09

## 2023-11-09 LAB
ALBUMIN SERPL-MCNC: 2.9 G/DL (ref 3.5–5)
ALBUMIN/GLOB SERPL: 0.7 (ref 1.1–2.2)
ALP SERPL-CCNC: 72 U/L (ref 45–117)
ALT SERPL-CCNC: 12 U/L (ref 12–78)
ANION GAP SERPL CALC-SCNC: 4 MMOL/L (ref 5–15)
APTT PPP: 25 SEC (ref 22.1–31)
AST SERPL-CCNC: 21 U/L (ref 15–37)
BASOPHILS # BLD: 0.1 K/UL (ref 0–0.1)
BASOPHILS NFR BLD: 0 % (ref 0–1)
BILIRUB SERPL-MCNC: 0.9 MG/DL (ref 0.2–1)
BUN SERPL-MCNC: 14 MG/DL (ref 6–20)
BUN/CREAT SERPL: 16 (ref 12–20)
CALCIUM SERPL-MCNC: 9.2 MG/DL (ref 8.5–10.1)
CHLORIDE SERPL-SCNC: 103 MMOL/L (ref 97–108)
CO2 SERPL-SCNC: 29 MMOL/L (ref 21–32)
CREAT SERPL-MCNC: 0.9 MG/DL (ref 0.55–1.02)
DIFFERENTIAL METHOD BLD: ABNORMAL
EOSINOPHIL # BLD: 0 K/UL (ref 0–0.4)
EOSINOPHIL NFR BLD: 0 % (ref 0–7)
ERYTHROCYTE [DISTWIDTH] IN BLOOD BY AUTOMATED COUNT: 14.6 % (ref 11.5–14.5)
GLOBULIN SER CALC-MCNC: 4.4 G/DL (ref 2–4)
GLUCOSE SERPL-MCNC: 150 MG/DL (ref 65–100)
HCT VFR BLD AUTO: 37.3 % (ref 35–47)
HGB BLD-MCNC: 12 G/DL (ref 11.5–16)
IMM GRANULOCYTES # BLD AUTO: 0.1 K/UL (ref 0–0.04)
IMM GRANULOCYTES NFR BLD AUTO: 1 % (ref 0–0.5)
INR PPP: 1.3 (ref 0.9–1.1)
LYMPHOCYTES # BLD: 1.8 K/UL (ref 0.8–3.5)
LYMPHOCYTES NFR BLD: 11 % (ref 12–49)
MCH RBC QN AUTO: 29.7 PG (ref 26–34)
MCHC RBC AUTO-ENTMCNC: 32.2 G/DL (ref 30–36.5)
MCV RBC AUTO: 92.3 FL (ref 80–99)
MONOCYTES # BLD: 1.5 K/UL (ref 0–1)
MONOCYTES NFR BLD: 9 % (ref 5–13)
NEUTS SEG # BLD: 13.3 K/UL (ref 1.8–8)
NEUTS SEG NFR BLD: 79 % (ref 32–75)
NRBC # BLD: 0 K/UL (ref 0–0.01)
NRBC BLD-RTO: 0 PER 100 WBC
NT PRO BNP: 6553 PG/ML
PLATELET # BLD AUTO: 268 K/UL (ref 150–400)
PMV BLD AUTO: 10.1 FL (ref 8.9–12.9)
POTASSIUM SERPL-SCNC: 4.6 MMOL/L (ref 3.5–5.1)
PROT SERPL-MCNC: 7.3 G/DL (ref 6.4–8.2)
PROTHROMBIN TIME: 13.1 SEC (ref 9–11.1)
RBC # BLD AUTO: 4.04 M/UL (ref 3.8–5.2)
SODIUM SERPL-SCNC: 136 MMOL/L (ref 136–145)
THERAPEUTIC RANGE: NORMAL SECS (ref 58–77)
TROPONIN I SERPL HS-MCNC: 38 NG/L (ref 0–51)
TROPONIN I SERPL HS-MCNC: 47 NG/L (ref 0–51)
UFH PPP CHRO-ACNC: 0.68 IU/ML
UFH PPP CHRO-ACNC: <0.1 IU/ML
WBC # BLD AUTO: 16.9 K/UL (ref 3.6–11)

## 2023-11-09 PROCEDURE — 6360000002 HC RX W HCPCS: Performed by: EMERGENCY MEDICINE

## 2023-11-09 PROCEDURE — 85610 PROTHROMBIN TIME: CPT

## 2023-11-09 PROCEDURE — 71275 CT ANGIOGRAPHY CHEST: CPT

## 2023-11-09 PROCEDURE — 80053 COMPREHEN METABOLIC PANEL: CPT

## 2023-11-09 PROCEDURE — 2580000003 HC RX 258: Performed by: STUDENT IN AN ORGANIZED HEALTH CARE EDUCATION/TRAINING PROGRAM

## 2023-11-09 PROCEDURE — 6360000002 HC RX W HCPCS: Performed by: STUDENT IN AN ORGANIZED HEALTH CARE EDUCATION/TRAINING PROGRAM

## 2023-11-09 PROCEDURE — 36415 COLL VENOUS BLD VENIPUNCTURE: CPT

## 2023-11-09 PROCEDURE — 99285 EMERGENCY DEPT VISIT HI MDM: CPT

## 2023-11-09 PROCEDURE — 6360000004 HC RX CONTRAST MEDICATION: Performed by: EMERGENCY MEDICINE

## 2023-11-09 PROCEDURE — 6370000000 HC RX 637 (ALT 250 FOR IP): Performed by: STUDENT IN AN ORGANIZED HEALTH CARE EDUCATION/TRAINING PROGRAM

## 2023-11-09 PROCEDURE — 71045 X-RAY EXAM CHEST 1 VIEW: CPT

## 2023-11-09 PROCEDURE — 85025 COMPLETE CBC W/AUTO DIFF WBC: CPT

## 2023-11-09 PROCEDURE — 1100000003 HC PRIVATE W/ TELEMETRY

## 2023-11-09 PROCEDURE — 85730 THROMBOPLASTIN TIME PARTIAL: CPT

## 2023-11-09 PROCEDURE — 84484 ASSAY OF TROPONIN QUANT: CPT

## 2023-11-09 PROCEDURE — 83880 ASSAY OF NATRIURETIC PEPTIDE: CPT

## 2023-11-09 PROCEDURE — 85520 HEPARIN ASSAY: CPT

## 2023-11-09 PROCEDURE — 93005 ELECTROCARDIOGRAM TRACING: CPT | Performed by: INTERNAL MEDICINE

## 2023-11-09 RX ORDER — MORPHINE SULFATE 2 MG/ML
2 INJECTION, SOLUTION INTRAMUSCULAR; INTRAVENOUS
Status: COMPLETED | OUTPATIENT
Start: 2023-11-09 | End: 2023-11-09

## 2023-11-09 RX ORDER — MAGNESIUM SULFATE IN WATER 40 MG/ML
2000 INJECTION, SOLUTION INTRAVENOUS PRN
Status: DISCONTINUED | OUTPATIENT
Start: 2023-11-09 | End: 2023-11-10

## 2023-11-09 RX ORDER — FUROSEMIDE 10 MG/ML
20 INJECTION INTRAMUSCULAR; INTRAVENOUS DAILY
Status: DISCONTINUED | OUTPATIENT
Start: 2023-11-09 | End: 2023-11-11

## 2023-11-09 RX ORDER — HEPARIN SODIUM 1000 [USP'U]/ML
80 INJECTION, SOLUTION INTRAVENOUS; SUBCUTANEOUS ONCE
Status: COMPLETED | OUTPATIENT
Start: 2023-11-09 | End: 2023-11-09

## 2023-11-09 RX ORDER — HEPARIN SODIUM 1000 [USP'U]/ML
80 INJECTION, SOLUTION INTRAVENOUS; SUBCUTANEOUS PRN
Status: DISCONTINUED | OUTPATIENT
Start: 2023-11-09 | End: 2023-11-10

## 2023-11-09 RX ORDER — ACETAMINOPHEN 325 MG/1
650 TABLET ORAL EVERY 6 HOURS PRN
Status: DISCONTINUED | OUTPATIENT
Start: 2023-11-09 | End: 2023-11-11 | Stop reason: HOSPADM

## 2023-11-09 RX ORDER — POLYETHYLENE GLYCOL 3350 17 G/17G
17 POWDER, FOR SOLUTION ORAL DAILY PRN
Status: DISCONTINUED | OUTPATIENT
Start: 2023-11-09 | End: 2023-11-11 | Stop reason: HOSPADM

## 2023-11-09 RX ORDER — ACETAMINOPHEN 650 MG/1
650 SUPPOSITORY RECTAL EVERY 6 HOURS PRN
Status: DISCONTINUED | OUTPATIENT
Start: 2023-11-09 | End: 2023-11-11 | Stop reason: HOSPADM

## 2023-11-09 RX ORDER — HEPARIN SODIUM 10000 [USP'U]/100ML
5-30 INJECTION, SOLUTION INTRAVENOUS CONTINUOUS
Status: DISCONTINUED | OUTPATIENT
Start: 2023-11-09 | End: 2023-11-10

## 2023-11-09 RX ORDER — ONDANSETRON 2 MG/ML
4 INJECTION INTRAMUSCULAR; INTRAVENOUS EVERY 6 HOURS PRN
Status: DISCONTINUED | OUTPATIENT
Start: 2023-11-09 | End: 2023-11-11 | Stop reason: HOSPADM

## 2023-11-09 RX ORDER — FUROSEMIDE 10 MG/ML
10 INJECTION INTRAMUSCULAR; INTRAVENOUS DAILY
Status: DISCONTINUED | OUTPATIENT
Start: 2023-11-09 | End: 2023-11-09

## 2023-11-09 RX ORDER — ONDANSETRON 4 MG/1
4 TABLET, ORALLY DISINTEGRATING ORAL EVERY 8 HOURS PRN
Status: DISCONTINUED | OUTPATIENT
Start: 2023-11-09 | End: 2023-11-11 | Stop reason: HOSPADM

## 2023-11-09 RX ORDER — SODIUM CHLORIDE 0.9 % (FLUSH) 0.9 %
5-40 SYRINGE (ML) INJECTION PRN
Status: DISCONTINUED | OUTPATIENT
Start: 2023-11-09 | End: 2023-11-11 | Stop reason: HOSPADM

## 2023-11-09 RX ORDER — POTASSIUM CHLORIDE 20 MEQ/1
40 TABLET, EXTENDED RELEASE ORAL PRN
Status: DISCONTINUED | OUTPATIENT
Start: 2023-11-09 | End: 2023-11-10

## 2023-11-09 RX ORDER — SODIUM CHLORIDE 0.9 % (FLUSH) 0.9 %
5-40 SYRINGE (ML) INJECTION EVERY 12 HOURS SCHEDULED
Status: DISCONTINUED | OUTPATIENT
Start: 2023-11-09 | End: 2023-11-11 | Stop reason: HOSPADM

## 2023-11-09 RX ORDER — POTASSIUM CHLORIDE 7.45 MG/ML
10 INJECTION INTRAVENOUS PRN
Status: DISCONTINUED | OUTPATIENT
Start: 2023-11-09 | End: 2023-11-10

## 2023-11-09 RX ORDER — SODIUM CHLORIDE 9 MG/ML
INJECTION, SOLUTION INTRAVENOUS PRN
Status: DISCONTINUED | OUTPATIENT
Start: 2023-11-09 | End: 2023-11-11 | Stop reason: HOSPADM

## 2023-11-09 RX ORDER — FUROSEMIDE 10 MG/ML
20 INJECTION INTRAMUSCULAR; INTRAVENOUS DAILY
Status: DISCONTINUED | OUTPATIENT
Start: 2023-11-09 | End: 2023-11-09

## 2023-11-09 RX ORDER — LOSARTAN POTASSIUM 25 MG/1
50 TABLET ORAL DAILY
Status: DISCONTINUED | OUTPATIENT
Start: 2023-11-09 | End: 2023-11-11 | Stop reason: HOSPADM

## 2023-11-09 RX ORDER — HEPARIN SODIUM 1000 [USP'U]/ML
40 INJECTION, SOLUTION INTRAVENOUS; SUBCUTANEOUS PRN
Status: DISCONTINUED | OUTPATIENT
Start: 2023-11-09 | End: 2023-11-10

## 2023-11-09 RX ORDER — POLYETHYLENE GLYCOL 3350 17 G/17G
17 POWDER, FOR SOLUTION ORAL DAILY
Status: DISCONTINUED | OUTPATIENT
Start: 2023-11-09 | End: 2023-11-11 | Stop reason: HOSPADM

## 2023-11-09 RX ADMIN — SODIUM CHLORIDE, PRESERVATIVE FREE 10 ML: 5 INJECTION INTRAVENOUS at 23:16

## 2023-11-09 RX ADMIN — MORPHINE SULFATE 2 MG: 2 INJECTION, SOLUTION INTRAMUSCULAR; INTRAVENOUS at 12:10

## 2023-11-09 RX ADMIN — IOPAMIDOL 100 ML: 755 INJECTION, SOLUTION INTRAVENOUS at 10:08

## 2023-11-09 RX ADMIN — FUROSEMIDE 20 MG: 10 INJECTION, SOLUTION INTRAMUSCULAR; INTRAVENOUS at 17:08

## 2023-11-09 RX ADMIN — HEPARIN SODIUM 6540 UNITS: 1000 INJECTION INTRAVENOUS; SUBCUTANEOUS at 12:14

## 2023-11-09 RX ADMIN — HEPARIN SODIUM AND DEXTROSE 18 UNITS/KG/HR: 10000; 5 INJECTION INTRAVENOUS at 12:15

## 2023-11-09 RX ADMIN — ACETAMINOPHEN 650 MG: 325 TABLET ORAL at 19:28

## 2023-11-09 ASSESSMENT — PAIN DESCRIPTION - LOCATION
LOCATION: CHEST
LOCATION: CHEST

## 2023-11-09 ASSESSMENT — PAIN SCALES - GENERAL
PAINLEVEL_OUTOF10: 0
PAINLEVEL_OUTOF10: 0
PAINLEVEL_OUTOF10: 3
PAINLEVEL_OUTOF10: 10

## 2023-11-09 NOTE — H&P
Hospitalist Admission Note    NAME:   Susi Dang   : 1940   MRN: [id number]     Date/Time: 2023 3:28 PM    Patient PCP: No primary care provider on file.    ______________________________________________________________________  Given the patient's current clinical presentation, I have a high level of concern for decompensation if discharged from the emergency department. Complex decision making was performed, which includes reviewing the patient's available past medical records, laboratory results, and x-ray films. My assessment of this patient's clinical condition and my plan of care is as follows. Assessment / Plan:    PE  Bilateral pleural effusion  And positive for bilateral pulmonary emboli  There is a small pleural effusion and bibasilar volume loss as  We will get echo  Start patient on heparin drip  Switch to oral AC on discharge  Outpatient coagulation work-up  BMP elevated  We will start low-dose of Lasix with parameter      Advanced dementia  Patient was on sertraline and call  Currently she is not using any of these medication    Hypertension  At home losartan  We will start her Lasix 20 mg daily with parameters    Constipation  Continue with daily bowel regimen  Abdomen soft    First-degree heart block  On EKG  Troponin is negative  We will get echo  Continue to monitor    Medical Decision Making:   I personally reviewed labs: Yes  I personally reviewed imaging:Yes  I personally reviewed EKG:  Toxic drug monitoring: YEs  Discussed case with: ED provider. After discussion I am in agreement that acuity of patient's medical condition necessitates hospital stay.       Code Status:   DVT Prophylaxis:   Baseline:     Subjective:   CHIEF COMPLAINT: chest pain    HISTORY OF PRESENT ILLNESS:     Susi Dang is a 79 y.o.  female with PMHx significant for HTN, advanced dementia but she is not on any medication currently, sent from from care for chest pain, this morning,

## 2023-11-09 NOTE — ED NOTES
Heparin drip verified by pharmacy to start at 18u/kg/hr with a 6540 unit bolus.      May Hart RN  11/09/23 1110       May Hart RN  11/09/23 4780

## 2023-11-10 ENCOUNTER — APPOINTMENT (OUTPATIENT)
Facility: HOSPITAL | Age: 83
End: 2023-11-10
Attending: STUDENT IN AN ORGANIZED HEALTH CARE EDUCATION/TRAINING PROGRAM
Payer: MEDICARE

## 2023-11-10 LAB
ALBUMIN SERPL-MCNC: 2.5 G/DL (ref 3.5–5)
ALBUMIN/GLOB SERPL: 0.7 (ref 1.1–2.2)
ALP SERPL-CCNC: 65 U/L (ref 45–117)
ALT SERPL-CCNC: 9 U/L (ref 12–78)
ANION GAP SERPL CALC-SCNC: 5 MMOL/L (ref 5–15)
AST SERPL-CCNC: 12 U/L (ref 15–37)
BASOPHILS # BLD: 0.1 K/UL (ref 0–0.1)
BASOPHILS NFR BLD: 0 % (ref 0–1)
BILIRUB SERPL-MCNC: 0.5 MG/DL (ref 0.2–1)
BUN SERPL-MCNC: 17 MG/DL (ref 6–20)
BUN/CREAT SERPL: 20 (ref 12–20)
CALCIUM SERPL-MCNC: 8.2 MG/DL (ref 8.5–10.1)
CHLORIDE SERPL-SCNC: 103 MMOL/L (ref 97–108)
CO2 SERPL-SCNC: 27 MMOL/L (ref 21–32)
CREAT SERPL-MCNC: 0.83 MG/DL (ref 0.55–1.02)
DIFFERENTIAL METHOD BLD: ABNORMAL
ECHO AV CUSP MM: 1.5 CM
ECHO LV E' LATERAL VELOCITY: 5 CM/S
ECHO LV E' SEPTAL VELOCITY: 5 CM/S
ECHO LV EDV A2C: 105 ML
ECHO LV EDV A4C: 95 ML
ECHO LV EDV BP: 100 ML (ref 56–104)
ECHO LV EJECTION FRACTION A2C: 61 %
ECHO LV EJECTION FRACTION A4C: 53 %
ECHO LV EJECTION FRACTION BIPLANE: 57 % (ref 55–100)
ECHO LV ESV A2C: 41 ML
ECHO LV ESV A4C: 44 ML
ECHO LV ESV BP: 43 ML (ref 19–49)
ECHO MV A VELOCITY: 1.38 M/S
ECHO MV E DECELERATION TIME (DT): 137.7 MS
ECHO MV E VELOCITY: 1.27 M/S
ECHO MV E/A RATIO: 0.92
ECHO MV E/E' LATERAL: 25.4
ECHO MV MAX VELOCITY: 1.4 M/S
ECHO MV MEAN GRADIENT: 5 MMHG
ECHO MV MEAN VELOCITY: 1.1 M/S
ECHO MV PEAK GRADIENT: 8 MMHG
ECHO MV REGURGITANT ALIASING (NYQUIST) VELOCITY: 31 CM/S
ECHO MV REGURGITANT VELOCITY PISA: 4.5 M/S
ECHO MV REGURGITANT VTIA: 139 CM
ECHO MV VTI: 34.8 CM
ECHO RV FREE WALL PEAK S': 8 CM/S
ECHO TV REGURGITANT MAX VELOCITY: 2.21 M/S
ECHO TV REGURGITANT PEAK GRADIENT: 20 MMHG
EOSINOPHIL # BLD: 0.2 K/UL (ref 0–0.4)
EOSINOPHIL NFR BLD: 1 % (ref 0–7)
ERYTHROCYTE [DISTWIDTH] IN BLOOD BY AUTOMATED COUNT: 14.3 % (ref 11.5–14.5)
GLOBULIN SER CALC-MCNC: 3.7 G/DL (ref 2–4)
GLUCOSE SERPL-MCNC: 185 MG/DL (ref 65–100)
HCT VFR BLD AUTO: 31.1 % (ref 35–47)
HGB BLD-MCNC: 9.9 G/DL (ref 11.5–16)
IMM GRANULOCYTES # BLD AUTO: 0.1 K/UL (ref 0–0.04)
IMM GRANULOCYTES NFR BLD AUTO: 1 % (ref 0–0.5)
LYMPHOCYTES # BLD: 2 K/UL (ref 0.8–3.5)
LYMPHOCYTES NFR BLD: 15 % (ref 12–49)
MCH RBC QN AUTO: 29.2 PG (ref 26–34)
MCHC RBC AUTO-ENTMCNC: 31.8 G/DL (ref 30–36.5)
MCV RBC AUTO: 91.7 FL (ref 80–99)
MONOCYTES # BLD: 1.1 K/UL (ref 0–1)
MONOCYTES NFR BLD: 8 % (ref 5–13)
NEUTS SEG # BLD: 9.8 K/UL (ref 1.8–8)
NEUTS SEG NFR BLD: 75 % (ref 32–75)
NRBC # BLD: 0 K/UL (ref 0–0.01)
NRBC BLD-RTO: 0 PER 100 WBC
PLATELET # BLD AUTO: 220 K/UL (ref 150–400)
PMV BLD AUTO: 10 FL (ref 8.9–12.9)
POTASSIUM SERPL-SCNC: 4 MMOL/L (ref 3.5–5.1)
PROT SERPL-MCNC: 6.2 G/DL (ref 6.4–8.2)
RBC # BLD AUTO: 3.39 M/UL (ref 3.8–5.2)
SODIUM SERPL-SCNC: 135 MMOL/L (ref 136–145)
UFH PPP CHRO-ACNC: 0.26 IU/ML
UFH PPP CHRO-ACNC: 1.45 IU/ML
WBC # BLD AUTO: 13.2 K/UL (ref 3.6–11)

## 2023-11-10 PROCEDURE — 80053 COMPREHEN METABOLIC PANEL: CPT

## 2023-11-10 PROCEDURE — 85025 COMPLETE CBC W/AUTO DIFF WBC: CPT

## 2023-11-10 PROCEDURE — 2700000000 HC OXYGEN THERAPY PER DAY

## 2023-11-10 PROCEDURE — 1100000003 HC PRIVATE W/ TELEMETRY

## 2023-11-10 PROCEDURE — 6360000002 HC RX W HCPCS: Performed by: EMERGENCY MEDICINE

## 2023-11-10 PROCEDURE — 85520 HEPARIN ASSAY: CPT

## 2023-11-10 PROCEDURE — C8929 TTE W OR WO FOL WCON,DOPPLER: HCPCS

## 2023-11-10 PROCEDURE — 6370000000 HC RX 637 (ALT 250 FOR IP): Performed by: STUDENT IN AN ORGANIZED HEALTH CARE EDUCATION/TRAINING PROGRAM

## 2023-11-10 PROCEDURE — 2580000003 HC RX 258: Performed by: STUDENT IN AN ORGANIZED HEALTH CARE EDUCATION/TRAINING PROGRAM

## 2023-11-10 PROCEDURE — 36415 COLL VENOUS BLD VENIPUNCTURE: CPT

## 2023-11-10 PROCEDURE — 6370000000 HC RX 637 (ALT 250 FOR IP): Performed by: INTERNAL MEDICINE

## 2023-11-10 PROCEDURE — 94760 N-INVAS EAR/PLS OXIMETRY 1: CPT

## 2023-11-10 PROCEDURE — 6360000002 HC RX W HCPCS: Performed by: STUDENT IN AN ORGANIZED HEALTH CARE EDUCATION/TRAINING PROGRAM

## 2023-11-10 PROCEDURE — 6360000004 HC RX CONTRAST MEDICATION: Performed by: INTERNAL MEDICINE

## 2023-11-10 RX ORDER — HYDROCODONE BITARTRATE AND ACETAMINOPHEN 5; 325 MG/1; MG/1
1 TABLET ORAL EVERY 6 HOURS PRN
Status: DISCONTINUED | OUTPATIENT
Start: 2023-11-10 | End: 2023-11-11 | Stop reason: HOSPADM

## 2023-11-10 RX ADMIN — HEPARIN SODIUM AND DEXTROSE 15 UNITS/KG/HR: 10000; 5 INJECTION INTRAVENOUS at 02:17

## 2023-11-10 RX ADMIN — HYDROCODONE BITARTRATE AND ACETAMINOPHEN 1 TABLET: 5; 325 TABLET ORAL at 18:47

## 2023-11-10 RX ADMIN — HEPARIN SODIUM AND DEXTROSE 17 UNITS/KG/HR: 10000; 5 INJECTION INTRAVENOUS at 07:43

## 2023-11-10 RX ADMIN — HEPARIN SODIUM 3270 UNITS: 1000 INJECTION INTRAVENOUS; SUBCUTANEOUS at 07:29

## 2023-11-10 RX ADMIN — SODIUM CHLORIDE, PRESERVATIVE FREE 10 ML: 5 INJECTION INTRAVENOUS at 21:08

## 2023-11-10 RX ADMIN — SODIUM CHLORIDE, PRESERVATIVE FREE 10 ML: 5 INJECTION INTRAVENOUS at 10:11

## 2023-11-10 RX ADMIN — FUROSEMIDE 20 MG: 10 INJECTION, SOLUTION INTRAMUSCULAR; INTRAVENOUS at 10:11

## 2023-11-10 RX ADMIN — PERFLUTREN 1.5 ML: 6.52 INJECTION, SUSPENSION INTRAVENOUS at 13:15

## 2023-11-10 RX ADMIN — POLYETHYLENE GLYCOL 3350 17 G: 17 POWDER, FOR SOLUTION ORAL at 10:09

## 2023-11-10 RX ADMIN — ACETAMINOPHEN 650 MG: 325 TABLET ORAL at 21:04

## 2023-11-10 RX ADMIN — APIXABAN 10 MG: 5 TABLET, FILM COATED ORAL at 17:46

## 2023-11-10 ASSESSMENT — PAIN DESCRIPTION - DESCRIPTORS
DESCRIPTORS: ACHING
DESCRIPTORS: DISCOMFORT

## 2023-11-10 ASSESSMENT — PAIN SCALES - GENERAL
PAINLEVEL_OUTOF10: 3
PAINLEVEL_OUTOF10: 0
PAINLEVEL_OUTOF10: 3

## 2023-11-10 ASSESSMENT — PAIN DESCRIPTION - ORIENTATION: ORIENTATION: MID

## 2023-11-10 ASSESSMENT — PAIN DESCRIPTION - LOCATION
LOCATION: CHEST
LOCATION: NECK

## 2023-11-10 NOTE — PLAN OF CARE
Problem: Discharge Planning  Goal: Discharge to home or other facility with appropriate resources  Outcome: Progressing     Problem: Skin/Tissue Integrity  Goal: Absence of new skin breakdown  Description: 1. Monitor for areas of redness and/or skin breakdown  2. Assess vascular access sites hourly  3. Every 4-6 hours minimum:  Change oxygen saturation probe site  4. Every 4-6 hours:  If on nasal continuous positive airway pressure, respiratory therapy assess nares and determine need for appliance change or resting period. Outcome: Progressing     Problem: Safety - Adult  Goal: Free from fall injury  Outcome: Progressing     Problem: ABCDS Injury Assessment  Goal: Absence of physical injury  Outcome: Progressing     Problem: Confusion  Goal: Confusion, delirium, dementia, or psychosis is improved or at baseline  Description: INTERVENTIONS:  1. Assess for possible contributors to thought disturbance, including medications, impaired vision or hearing, underlying metabolic abnormalities, dehydration, psychiatric diagnoses, and notify attending LIP  2. [place] high risk fall precautions, as indicated  3. Provide frequent short contacts to provide reality reorientation, refocusing and direction  4. Decrease environmental stimuli, including noise as appropriate  5. Monitor and intervene to maintain adequate nutrition, hydration, elimination, sleep and activity  6. If unable to ensure safety without constant attention obtain sitter and review sitter guidelines with assigned personnel  7.  Initiate Psychosocial CNS and Spiritual Care consult, as indicated  Outcome: Progressing

## 2023-11-10 NOTE — PROGRESS NOTES
0924: Heparin rate change and bolus verified with pharmacy, Shine Goodman. 0596: Heparin injection 3,270 units given. Rate changed to 17 units/kg/hr due to Heparin Xa being 0.26. Dual sign off completed with RN.

## 2023-11-10 NOTE — PROGRESS NOTES
Pt complain of chest pain ,vitals taken , bp  132/65 pulse -87 notified to attending doctor , HYDROcodone-acetaminophen . ([address]) 5-325 MG oderd by doctor . Medication given .

## 2023-11-10 NOTE — PROGRESS NOTES
1918     Verbal handoff report given to  [facility], RN. Report included the following information Nurse Handoff Report, MAR, Telemetry status, Recent Results, and plan of care. Oncoming RN verbalized understanding of plan of care with opportunity for clarification and questions.

## 2023-11-10 NOTE — PROGRESS NOTES
36H Pt is a new admission from ED. This RN verified Heparin drip rate with Primary nurse (Glo). Heparin drip currently running at 18units/kg/hr.

## 2023-11-11 ENCOUNTER — APPOINTMENT (OUTPATIENT)
Facility: HOSPITAL | Age: 83
End: 2023-11-11
Payer: MEDICARE

## 2023-11-11 VITALS
RESPIRATION RATE: 19 BRPM | OXYGEN SATURATION: 95 % | TEMPERATURE: 97.7 F | BODY MASS INDEX: 26.6 KG/M2 | WEIGHT: 180.12 LBS | DIASTOLIC BLOOD PRESSURE: 67 MMHG | SYSTOLIC BLOOD PRESSURE: 132 MMHG | HEART RATE: 79 BPM

## 2023-11-11 LAB
EKG ATRIAL RATE: 90 BPM
EKG DIAGNOSIS: NORMAL
EKG P AXIS: 72 DEGREES
EKG P-R INTERVAL: 216 MS
EKG Q-T INTERVAL: 426 MS
EKG QRS DURATION: 160 MS
EKG QTC CALCULATION (BAZETT): 521 MS
EKG R AXIS: -37 DEGREES
EKG T AXIS: 90 DEGREES
EKG VENTRICULAR RATE: 90 BPM
ERYTHROCYTE [DISTWIDTH] IN BLOOD BY AUTOMATED COUNT: 14.1 % (ref 11.5–14.5)
HCT VFR BLD AUTO: 35.2 % (ref 35–47)
HGB BLD-MCNC: 11.4 G/DL (ref 11.5–16)
MCH RBC QN AUTO: 29.9 PG (ref 26–34)
MCHC RBC AUTO-ENTMCNC: 32.4 G/DL (ref 30–36.5)
MCV RBC AUTO: 92.4 FL (ref 80–99)
NRBC # BLD: 0 K/UL (ref 0–0.01)
NRBC BLD-RTO: 0 PER 100 WBC
PLATELET # BLD AUTO: 264 K/UL (ref 150–400)
PMV BLD AUTO: 10.1 FL (ref 8.9–12.9)
RBC # BLD AUTO: 3.81 M/UL (ref 3.8–5.2)
WBC # BLD AUTO: 10.2 K/UL (ref 3.6–11)

## 2023-11-11 PROCEDURE — 70450 CT HEAD/BRAIN W/O DYE: CPT

## 2023-11-11 PROCEDURE — 2580000003 HC RX 258: Performed by: STUDENT IN AN ORGANIZED HEALTH CARE EDUCATION/TRAINING PROGRAM

## 2023-11-11 PROCEDURE — 6370000000 HC RX 637 (ALT 250 FOR IP): Performed by: INTERNAL MEDICINE

## 2023-11-11 PROCEDURE — 94760 N-INVAS EAR/PLS OXIMETRY 1: CPT

## 2023-11-11 PROCEDURE — 36415 COLL VENOUS BLD VENIPUNCTURE: CPT

## 2023-11-11 PROCEDURE — 85027 COMPLETE CBC AUTOMATED: CPT

## 2023-11-11 RX ORDER — QUETIAPINE FUMARATE 25 MG/1
25 TABLET, FILM COATED ORAL NIGHTLY
Qty: 60 TABLET | Refills: 0 | Status: SHIPPED
Start: 2023-11-11

## 2023-11-11 RX ORDER — QUETIAPINE FUMARATE 25 MG/1
25 TABLET, FILM COATED ORAL NIGHTLY
Status: DISCONTINUED | OUTPATIENT
Start: 2023-11-11 | End: 2023-11-11 | Stop reason: HOSPADM

## 2023-11-11 RX ADMIN — SODIUM CHLORIDE, PRESERVATIVE FREE 10 ML: 5 INJECTION INTRAVENOUS at 08:04

## 2023-11-11 RX ADMIN — APIXABAN 10 MG: 5 TABLET, FILM COATED ORAL at 10:22

## 2023-11-11 ASSESSMENT — PAIN SCALES - GENERAL: PAINLEVEL_OUTOF10: 0

## 2023-11-11 NOTE — CARE COORDINATION
11/11/23 1058   Service Assessment   Patient Orientation Other (see comment)  (spoke to pt's daughter)   Cognition Dementia / Early Alzheimer's   History Provided By Child/Family   Primary Caregiver Other (Comment)  ([facility])   [address] is: Named in [address] [place] [address] with the following:;Bathing;Dressing;Cooking;Housework; Shopping;Mobility; Toileting   Current Functional Level Assistance with the following:;Bathing;Dressing;Cooking;Housework; Shopping; Toileting;Mobility   Can patient return to prior living arrangement Yes   Ability to make needs known: Poor   Family able to assist with home care needs: Yes   Would you like for me to discuss the discharge plan with any other family members/significant others, and if so, who? Yes  (dtr)   Financial Resources Medicare   [email or web address] History   Lives With Alone   Type of Home Assisted living   Home Layout One level   Home Access Level entry   [facility] bed   ADL Assistance Needs assistance   [facility] assistance   Transfer Assistance Needs assistance   Active  No   Discharge Planning   Type of Residence Assisted living   Patient expects to be discharged to: Assisted living   [address] [facility] Discharge   Transition of [address] [place] [address]  ([address]) [address] Discharge Transport; Long term care   [address] Provided?  No   Mode of Transport at Discharge [address] Time of Discharge 1700   Confirm Follow Up Transport Family   Condition of Participation: Discharge Planning   The Plan for Transition of Care is related to the following treatment goals: return to [facility] memory care unit   The Patient and/or Patient Representative was provided

## 2023-11-11 NOTE — PROGRESS NOTES
Occupational Therapy  OT consult received, chart reviewed. Patient is a long term care resident and has assistance with all ADL. She is being discharged back to her facility today. No acute OT needs identified. Will complete the OT order and sign off.

## 2023-11-11 NOTE — DISCHARGE INSTRUCTIONS
HOSPITALIST DISCHARGE INSTRUCTIONS    NAME: Darlene Vizcaino   :  1940   MRN:  [id number]     Date/Time:  2023 9:45 AM    ADMIT DATE: 2023   DISCHARGE DATE: 2023     Attending Physician: Janet Bell MD    DISCHARGE DIAGNOSIS:  B/L PE  Advanced dementia   Hypertension  Constipation  First-degree heart block    Medications: Per above medication reconciliation. Pain Management: per above medications    Recommended diet: Resume Previous Diet    Recommended activity: activity as tolerated    Required Lab work: Weekly CBC for 2 weeks as recently started on Eliquis then routine per SNF protocol    Code status: DNR/DNI    Information obtained by :  I understand that if any problems occur once I am at home I am to contact my physician. I understand and acknowledge receipt of the instructions indicated above.                                                                                                                                            Physician's or R.N.'s Signature                                                                  Date/Time                                                                                                                                              Patient or Repres

## 2023-11-11 NOTE — PROGRESS NOTES
Physical Therapy    PT consult received, chart reviewed. Discussed patient with RN. Patient is a long term care resident and has assistance with mobility. She is being discharged back to her facility today. No acute PT needs identified. Will complete the PT order and sign off.

## 2023-11-11 NOTE — PLAN OF CARE
Pt picked up by transport. DNR, Facesheet and AVS given to transporters. Pericare completed prior to leaving. IVSL removed. Problem: Discharge Planning  Goal: Discharge to home or other facility with appropriate resources  Outcome: Adequate for Discharge     Problem: Skin/Tissue Integrity  Goal: Absence of new skin breakdown  Description: 1. Monitor for areas of redness and/or skin breakdown  2. Assess vascular access sites hourly  3. Every 4-6 hours minimum:  Change oxygen saturation probe site  4. Every 4-6 hours:  If on nasal continuous positive airway pressure, respiratory therapy assess nares and determine need for appliance change or resting period. Outcome: Adequate for Discharge     Problem: Safety - Adult  Goal: Free from fall injury  Outcome: Adequate for Discharge     Problem: ABCDS Injury Assessment  Goal: Absence of physical injury  Outcome: Adequate for Discharge     Problem: Confusion  Goal: Confusion, delirium, dementia, or psychosis is improved or at baseline  Description: INTERVENTIONS:  1. Assess for possible contributors to thought disturbance, including medications, impaired vision or hearing, underlying metabolic abnormalities, dehydration, psychiatric diagnoses, and notify attending LIP  2. [place] high risk fall precautions, as indicated  3. Provide frequent short contacts to provide reality reorientation, refocusing and direction  4. Decrease environmental stimuli, including noise as appropriate  5. Monitor and intervene to maintain adequate nutrition, hydration, elimination, sleep and activity  6. If unable to ensure safety without constant attention obtain sitter and review sitter guidelines with assigned personnel  7.  Initiate Psychosocial CNS and Spiritual Care consult, as indicated  Outcome: Adequate for Discharge

## 2024-01-23 ENCOUNTER — APPOINTMENT (OUTPATIENT)
Facility: HOSPITAL | Age: 84
End: 2024-01-23
Payer: MEDICARE

## 2024-01-23 ENCOUNTER — HOSPITAL ENCOUNTER (INPATIENT)
Facility: HOSPITAL | Age: 84
LOS: 2 days | Discharge: NURSING, REHAB OR LONG-TERM CARE | End: 2024-01-26
Attending: EMERGENCY MEDICINE | Admitting: INTERNAL MEDICINE
Payer: MEDICARE

## 2024-01-23 DIAGNOSIS — J96.01 ACUTE RESPIRATORY FAILURE WITH HYPOXIA (HCC): Primary | ICD-10-CM

## 2024-01-23 DIAGNOSIS — N30.00 ACUTE CYSTITIS WITHOUT HEMATURIA: ICD-10-CM

## 2024-01-23 DIAGNOSIS — J90 PLEURAL EFFUSION: ICD-10-CM

## 2024-01-23 DIAGNOSIS — Z86.711 HISTORY OF PULMONARY EMBOLISM: ICD-10-CM

## 2024-01-23 DIAGNOSIS — I44.7 LBBB (LEFT BUNDLE BRANCH BLOCK): ICD-10-CM

## 2024-01-23 LAB
ANION GAP BLD CALC-SCNC: 14 (ref 10–20)
ARTERIAL PATENCY WRIST A: POSITIVE
BASE DEFICIT BLD-SCNC: 7 MMOL/L
BASE DEFICIT BLD-SCNC: 7.9 MMOL/L
BDY SITE: ABNORMAL
CA-I BLD-MCNC: 1.19 MMOL/L (ref 1.12–1.32)
CHLORIDE BLD-SCNC: 103 MMOL/L (ref 100–108)
CO2 BLD-SCNC: 21 MMOL/L (ref 19–24)
COMMENT:: NORMAL
CREAT UR-MCNC: 0.7 MG/DL (ref 0.6–1.3)
GAS FLOW.O2 O2 DELIVERY SYS: ABNORMAL
GLUCOSE BLD STRIP.AUTO-MCNC: 396 MG/DL (ref 74–106)
HCO3 BLD-SCNC: 19.7 MMOL/L (ref 22–26)
HCO3 BLDA-SCNC: 20 MMOL/L
IPAP/PIP/HIGH PEEP: 16
LACTATE BLD-SCNC: 5.72 MMOL/L (ref 0.4–2)
O2/TOTAL GAS SETTING VFR VENT: 100 %
PCO2 BLD: 42.7 MMHG (ref 35–45)
PCO2 BLDV: 50.4 MMHG (ref 41–51)
PEEP RESPIRATORY: 8 CMH2O
PH BLD: 7.27 (ref 7.35–7.45)
PH BLDV: 7.22 (ref 7.32–7.42)
PO2 BLD: 362 MMHG (ref 80–100)
PO2 BLDV: 49 MMHG (ref 25–40)
POTASSIUM BLD-SCNC: 4.4 MMOL/L (ref 3.5–5.5)
SAO2 % BLD: 76 %
SAO2 % BLD: 99.9 % (ref 92–97)
SERVICE CMNT-IMP: ABNORMAL
SODIUM BLD-SCNC: 138 MMOL/L (ref 136–145)
SPECIMEN HOLD: NORMAL
SPECIMEN SITE: ABNORMAL
SPECIMEN TYPE: ABNORMAL

## 2024-01-23 PROCEDURE — 84484 ASSAY OF TROPONIN QUANT: CPT

## 2024-01-23 PROCEDURE — 36415 COLL VENOUS BLD VENIPUNCTURE: CPT

## 2024-01-23 PROCEDURE — 71045 X-RAY EXAM CHEST 1 VIEW: CPT

## 2024-01-23 PROCEDURE — 80053 COMPREHEN METABOLIC PANEL: CPT

## 2024-01-23 PROCEDURE — 87040 BLOOD CULTURE FOR BACTERIA: CPT

## 2024-01-23 PROCEDURE — 82330 ASSAY OF CALCIUM: CPT

## 2024-01-23 PROCEDURE — 87635 SARS-COV-2 COVID-19 AMP PRB: CPT

## 2024-01-23 PROCEDURE — 83735 ASSAY OF MAGNESIUM: CPT

## 2024-01-23 PROCEDURE — 6360000002 HC RX W HCPCS: Performed by: EMERGENCY MEDICINE

## 2024-01-23 PROCEDURE — 94660 CPAP INITIATION&MGMT: CPT

## 2024-01-23 PROCEDURE — 81001 URINALYSIS AUTO W/SCOPE: CPT

## 2024-01-23 PROCEDURE — 83690 ASSAY OF LIPASE: CPT

## 2024-01-23 PROCEDURE — 93005 ELECTROCARDIOGRAM TRACING: CPT | Performed by: EMERGENCY MEDICINE

## 2024-01-23 PROCEDURE — 2580000003 HC RX 258: Performed by: EMERGENCY MEDICINE

## 2024-01-23 PROCEDURE — 36600 WITHDRAWAL OF ARTERIAL BLOOD: CPT

## 2024-01-23 PROCEDURE — 87804 INFLUENZA ASSAY W/OPTIC: CPT

## 2024-01-23 PROCEDURE — 82803 BLOOD GASES ANY COMBINATION: CPT

## 2024-01-23 PROCEDURE — 84295 ASSAY OF SERUM SODIUM: CPT

## 2024-01-23 PROCEDURE — 83880 ASSAY OF NATRIURETIC PEPTIDE: CPT

## 2024-01-23 PROCEDURE — 85025 COMPLETE CBC W/AUTO DIFF WBC: CPT

## 2024-01-23 PROCEDURE — 96361 HYDRATE IV INFUSION ADD-ON: CPT

## 2024-01-23 PROCEDURE — 82947 ASSAY GLUCOSE BLOOD QUANT: CPT

## 2024-01-23 PROCEDURE — 87086 URINE CULTURE/COLONY COUNT: CPT

## 2024-01-23 PROCEDURE — 96374 THER/PROPH/DIAG INJ IV PUSH: CPT

## 2024-01-23 PROCEDURE — 84132 ASSAY OF SERUM POTASSIUM: CPT

## 2024-01-23 PROCEDURE — 99291 CRITICAL CARE FIRST HOUR: CPT

## 2024-01-23 PROCEDURE — 84145 PROCALCITONIN (PCT): CPT

## 2024-01-23 RX ORDER — 0.9 % SODIUM CHLORIDE 0.9 %
30 INTRAVENOUS SOLUTION INTRAVENOUS ONCE
Status: COMPLETED | OUTPATIENT
Start: 2024-01-23 | End: 2024-01-24

## 2024-01-23 RX ORDER — DEXAMETHASONE SODIUM PHOSPHATE 10 MG/ML
10 INJECTION, SOLUTION INTRAMUSCULAR; INTRAVENOUS
Status: COMPLETED | OUTPATIENT
Start: 2024-01-23 | End: 2024-01-23

## 2024-01-23 RX ADMIN — DEXAMETHASONE SODIUM PHOSPHATE 10 MG: 10 INJECTION INTRAMUSCULAR; INTRAVENOUS at 23:32

## 2024-01-23 RX ADMIN — SODIUM CHLORIDE 2500 ML: 9 INJECTION, SOLUTION INTRAVENOUS at 23:30

## 2024-01-23 ASSESSMENT — PAIN SCALES - PAIN ASSESSMENT IN ADVANCED DEMENTIA (PAINAD)
NEGVOCALIZATION: 0
BODYLANGUAGE: 0
FACIALEXPRESSION: 0
TOTALSCORE: 0
BREATHING: 0
CONSOLABILITY: 0

## 2024-01-23 ASSESSMENT — LIFESTYLE VARIABLES
HOW OFTEN DO YOU HAVE A DRINK CONTAINING ALCOHOL: PATIENT UNABLE TO ANSWER
HOW OFTEN DO YOU HAVE A DRINK CONTAINING ALCOHOL: PATIENT UNABLE TO ANSWER
HOW MANY STANDARD DRINKS CONTAINING ALCOHOL DO YOU HAVE ON A TYPICAL DAY: PATIENT UNABLE TO ANSWER
HOW MANY STANDARD DRINKS CONTAINING ALCOHOL DO YOU HAVE ON A TYPICAL DAY: PATIENT UNABLE TO ANSWER

## 2024-01-24 PROBLEM — U07.1 PNEUMONIA DUE TO COVID-19 VIRUS: Status: ACTIVE | Noted: 2024-01-24

## 2024-01-24 PROBLEM — J12.82 PNEUMONIA DUE TO COVID-19 VIRUS: Status: ACTIVE | Noted: 2024-01-24

## 2024-01-24 LAB
ALBUMIN SERPL-MCNC: 3 G/DL (ref 3.5–5)
ALBUMIN/GLOB SERPL: 0.7 (ref 1.1–2.2)
ALP SERPL-CCNC: 108 U/L (ref 45–117)
ALT SERPL-CCNC: 10 U/L (ref 12–78)
AMORPH CRY URNS QL MICRO: ABNORMAL
ANION GAP SERPL CALC-SCNC: 5 MMOL/L (ref 5–15)
ANION GAP SERPL CALC-SCNC: 9 MMOL/L (ref 5–15)
APPEARANCE UR: ABNORMAL
APTT PPP: 28.7 SEC (ref 22.1–31)
AST SERPL-CCNC: 8 U/L (ref 15–37)
BACTERIA URNS QL MICRO: ABNORMAL /HPF
BASOPHILS # BLD: 0 K/UL (ref 0–0.1)
BASOPHILS # BLD: 0 K/UL (ref 0–0.1)
BASOPHILS NFR BLD: 0 % (ref 0–1)
BASOPHILS NFR BLD: 0 % (ref 0–1)
BILIRUB SERPL-MCNC: 0.5 MG/DL (ref 0.2–1)
BILIRUB UR QL: NEGATIVE
BUN SERPL-MCNC: 7 MG/DL (ref 6–20)
BUN SERPL-MCNC: 9 MG/DL (ref 6–20)
BUN/CREAT SERPL: 10 (ref 12–20)
BUN/CREAT SERPL: 11 (ref 12–20)
CALCIUM SERPL-MCNC: 8.7 MG/DL (ref 8.5–10.1)
CALCIUM SERPL-MCNC: 8.9 MG/DL (ref 8.5–10.1)
CAOX CRY URNS QL MICRO: ABNORMAL
CHLORIDE SERPL-SCNC: 105 MMOL/L (ref 97–108)
CHLORIDE SERPL-SCNC: 108 MMOL/L (ref 97–108)
CO2 SERPL-SCNC: 20 MMOL/L (ref 21–32)
CO2 SERPL-SCNC: 26 MMOL/L (ref 21–32)
COLOR UR: ABNORMAL
COMMENT:: NORMAL
CREAT SERPL-MCNC: 0.62 MG/DL (ref 0.55–1.02)
CREAT SERPL-MCNC: 0.92 MG/DL (ref 0.55–1.02)
CRP SERPL-MCNC: <0.29 MG/DL (ref 0–0.3)
DIFFERENTIAL METHOD BLD: ABNORMAL
DIFFERENTIAL METHOD BLD: ABNORMAL
EKG ATRIAL RATE: 123 BPM
EKG DIAGNOSIS: NORMAL
EKG P AXIS: 4 DEGREES
EKG P-R INTERVAL: 200 MS
EKG Q-T INTERVAL: 330 MS
EKG QRS DURATION: 156 MS
EKG QTC CALCULATION (BAZETT): 472 MS
EKG R AXIS: -27 DEGREES
EKG T AXIS: 141 DEGREES
EKG VENTRICULAR RATE: 123 BPM
EOSINOPHIL # BLD: 0 K/UL (ref 0–0.4)
EOSINOPHIL # BLD: 0.6 K/UL (ref 0–0.4)
EOSINOPHIL NFR BLD: 0 % (ref 0–7)
EOSINOPHIL NFR BLD: 3 % (ref 0–7)
EPITH CASTS URNS QL MICRO: ABNORMAL /LPF
ERYTHROCYTE [DISTWIDTH] IN BLOOD BY AUTOMATED COUNT: 15.6 % (ref 11.5–14.5)
ERYTHROCYTE [DISTWIDTH] IN BLOOD BY AUTOMATED COUNT: 15.7 % (ref 11.5–14.5)
ERYTHROCYTE [DISTWIDTH] IN BLOOD BY AUTOMATED COUNT: 16 % (ref 11.5–14.5)
EST. AVERAGE GLUCOSE BLD GHB EST-MCNC: 117 MG/DL
FLUAV AG NPH QL IA: NEGATIVE
FLUBV AG NOSE QL IA: NEGATIVE
GLOBULIN SER CALC-MCNC: 4.3 G/DL (ref 2–4)
GLUCOSE SERPL-MCNC: 143 MG/DL (ref 65–100)
GLUCOSE SERPL-MCNC: 332 MG/DL (ref 65–100)
GLUCOSE UR STRIP.AUTO-MCNC: 100 MG/DL
HBA1C MFR BLD: 5.7 % (ref 4–5.6)
HCT VFR BLD AUTO: 40.8 % (ref 35–47)
HCT VFR BLD AUTO: 41.7 % (ref 35–47)
HCT VFR BLD AUTO: 47.3 % (ref 35–47)
HGB BLD-MCNC: 12.9 G/DL (ref 11.5–16)
HGB BLD-MCNC: 13.3 G/DL (ref 11.5–16)
HGB BLD-MCNC: 14.4 G/DL (ref 11.5–16)
HGB UR QL STRIP: ABNORMAL
IMM GRANULOCYTES # BLD AUTO: 0 K/UL (ref 0–0.04)
IMM GRANULOCYTES # BLD AUTO: 0.1 K/UL (ref 0–0.04)
IMM GRANULOCYTES NFR BLD AUTO: 0 % (ref 0–0.5)
IMM GRANULOCYTES NFR BLD AUTO: 1 % (ref 0–0.5)
INR PPP: 1.3 (ref 0.9–1.1)
KETONES UR QL STRIP.AUTO: ABNORMAL MG/DL
LACTATE BLD-SCNC: 3.62 MMOL/L (ref 0.4–2)
LACTATE SERPL-SCNC: 1.3 MMOL/L (ref 0.4–2)
LACTATE SERPL-SCNC: 2.8 MMOL/L (ref 0.4–2)
LEUKOCYTE ESTERASE UR QL STRIP.AUTO: ABNORMAL
LIPASE SERPL-CCNC: 21 U/L (ref 13–75)
LYMPHOCYTES # BLD: 1.4 K/UL (ref 0.8–3.5)
LYMPHOCYTES # BLD: 4 K/UL (ref 0.8–3.5)
LYMPHOCYTES NFR BLD: 22 % (ref 12–49)
LYMPHOCYTES NFR BLD: 9 % (ref 12–49)
MAGNESIUM SERPL-MCNC: 1.8 MG/DL (ref 1.6–2.4)
MCH RBC QN AUTO: 27.5 PG (ref 26–34)
MCH RBC QN AUTO: 27.6 PG (ref 26–34)
MCH RBC QN AUTO: 27.8 PG (ref 26–34)
MCHC RBC AUTO-ENTMCNC: 30.4 G/DL (ref 30–36.5)
MCHC RBC AUTO-ENTMCNC: 31.6 G/DL (ref 30–36.5)
MCHC RBC AUTO-ENTMCNC: 31.9 G/DL (ref 30–36.5)
MCV RBC AUTO: 87.2 FL (ref 80–99)
MCV RBC AUTO: 87.2 FL (ref 80–99)
MCV RBC AUTO: 90.4 FL (ref 80–99)
MONOCYTES # BLD: 0.3 K/UL (ref 0–1)
MONOCYTES # BLD: 0.7 K/UL (ref 0–1)
MONOCYTES NFR BLD: 2 % (ref 5–13)
MONOCYTES NFR BLD: 4 % (ref 5–13)
NEUTS SEG # BLD: 13.1 K/UL (ref 1.8–8)
NEUTS SEG # BLD: 14.2 K/UL (ref 1.8–8)
NEUTS SEG NFR BLD: 71 % (ref 32–75)
NEUTS SEG NFR BLD: 88 % (ref 32–75)
NITRITE UR QL STRIP.AUTO: POSITIVE
NRBC # BLD: 0 K/UL (ref 0–0.01)
NRBC BLD-RTO: 0 PER 100 WBC
NT PRO BNP: 4903 PG/ML
PH UR STRIP: 6 (ref 5–8)
PLATELET # BLD AUTO: 265 K/UL (ref 150–400)
PLATELET # BLD AUTO: 272 K/UL (ref 150–400)
PLATELET # BLD AUTO: 322 K/UL (ref 150–400)
PMV BLD AUTO: 10.3 FL (ref 8.9–12.9)
PMV BLD AUTO: 9.6 FL (ref 8.9–12.9)
PMV BLD AUTO: 9.9 FL (ref 8.9–12.9)
POTASSIUM SERPL-SCNC: 3.3 MMOL/L (ref 3.5–5.1)
POTASSIUM SERPL-SCNC: 4.1 MMOL/L (ref 3.5–5.1)
PROCALCITONIN SERPL-MCNC: 0.08 NG/ML
PROCALCITONIN SERPL-MCNC: <0.05 NG/ML
PROT SERPL-MCNC: 7.3 G/DL (ref 6.4–8.2)
PROT UR STRIP-MCNC: 300 MG/DL
PROTHROMBIN TIME: 13.1 SEC (ref 9–11.1)
RBC # BLD AUTO: 4.68 M/UL (ref 3.8–5.2)
RBC # BLD AUTO: 4.78 M/UL (ref 3.8–5.2)
RBC # BLD AUTO: 5.23 M/UL (ref 3.8–5.2)
RBC #/AREA URNS HPF: ABNORMAL /HPF (ref 0–5)
RBC MORPH BLD: ABNORMAL
SARS-COV-2 RDRP RESP QL NAA+PROBE: NOT DETECTED
SODIUM SERPL-SCNC: 134 MMOL/L (ref 136–145)
SODIUM SERPL-SCNC: 139 MMOL/L (ref 136–145)
SOURCE: NORMAL
SP GR UR REFRACTOMETRY: 1.01
SPECIMEN HOLD: NORMAL
THERAPEUTIC RANGE: NORMAL SECS (ref 58–77)
TROPONIN I SERPL HS-MCNC: 154 NG/L (ref 0–51)
TROPONIN I SERPL HS-MCNC: 26 NG/L (ref 0–51)
TROPONIN I SERPL HS-MCNC: 303 NG/L (ref 0–51)
UFH PPP CHRO-ACNC: 0.7 IU/ML
URINE CULTURE IF INDICATED: ABNORMAL
UROBILINOGEN UR QL STRIP.AUTO: 1 EU/DL (ref 0.2–1)
WBC # BLD AUTO: 16.1 K/UL (ref 3.6–11)
WBC # BLD AUTO: 17 K/UL (ref 3.6–11)
WBC # BLD AUTO: 18.4 K/UL (ref 3.6–11)
WBC URNS QL MICRO: ABNORMAL /HPF (ref 0–4)

## 2024-01-24 PROCEDURE — 6360000002 HC RX W HCPCS: Performed by: EMERGENCY MEDICINE

## 2024-01-24 PROCEDURE — 85730 THROMBOPLASTIN TIME PARTIAL: CPT

## 2024-01-24 PROCEDURE — 2580000003 HC RX 258: Performed by: EMERGENCY MEDICINE

## 2024-01-24 PROCEDURE — 93005 ELECTROCARDIOGRAM TRACING: CPT | Performed by: STUDENT IN AN ORGANIZED HEALTH CARE EDUCATION/TRAINING PROGRAM

## 2024-01-24 PROCEDURE — 6360000002 HC RX W HCPCS: Performed by: STUDENT IN AN ORGANIZED HEALTH CARE EDUCATION/TRAINING PROGRAM

## 2024-01-24 PROCEDURE — 83036 HEMOGLOBIN GLYCOSYLATED A1C: CPT

## 2024-01-24 PROCEDURE — 36415 COLL VENOUS BLD VENIPUNCTURE: CPT

## 2024-01-24 PROCEDURE — 96365 THER/PROPH/DIAG IV INF INIT: CPT

## 2024-01-24 PROCEDURE — 6370000000 HC RX 637 (ALT 250 FOR IP): Performed by: STUDENT IN AN ORGANIZED HEALTH CARE EDUCATION/TRAINING PROGRAM

## 2024-01-24 PROCEDURE — 80048 BASIC METABOLIC PNL TOTAL CA: CPT

## 2024-01-24 PROCEDURE — 85610 PROTHROMBIN TIME: CPT

## 2024-01-24 PROCEDURE — 86140 C-REACTIVE PROTEIN: CPT

## 2024-01-24 PROCEDURE — 2580000003 HC RX 258: Performed by: STUDENT IN AN ORGANIZED HEALTH CARE EDUCATION/TRAINING PROGRAM

## 2024-01-24 PROCEDURE — 5A09357 ASSISTANCE WITH RESPIRATORY VENTILATION, LESS THAN 24 CONSECUTIVE HOURS, CONTINUOUS POSITIVE AIRWAY PRESSURE: ICD-10-PCS | Performed by: STUDENT IN AN ORGANIZED HEALTH CARE EDUCATION/TRAINING PROGRAM

## 2024-01-24 PROCEDURE — 1100000003 HC PRIVATE W/ TELEMETRY

## 2024-01-24 PROCEDURE — 83605 ASSAY OF LACTIC ACID: CPT

## 2024-01-24 PROCEDURE — 85520 HEPARIN ASSAY: CPT

## 2024-01-24 PROCEDURE — 6360000002 HC RX W HCPCS: Performed by: INTERNAL MEDICINE

## 2024-01-24 PROCEDURE — 2700000000 HC OXYGEN THERAPY PER DAY

## 2024-01-24 PROCEDURE — 84484 ASSAY OF TROPONIN QUANT: CPT

## 2024-01-24 PROCEDURE — 85025 COMPLETE CBC W/AUTO DIFF WBC: CPT

## 2024-01-24 PROCEDURE — 84145 PROCALCITONIN (PCT): CPT

## 2024-01-24 PROCEDURE — 6370000000 HC RX 637 (ALT 250 FOR IP): Performed by: INTERNAL MEDICINE

## 2024-01-24 PROCEDURE — 85027 COMPLETE CBC AUTOMATED: CPT

## 2024-01-24 RX ORDER — ONDANSETRON 2 MG/ML
4 INJECTION INTRAMUSCULAR; INTRAVENOUS EVERY 4 HOURS PRN
Status: DISCONTINUED | OUTPATIENT
Start: 2024-01-24 | End: 2024-01-25 | Stop reason: SDUPTHER

## 2024-01-24 RX ORDER — POTASSIUM CHLORIDE 1.5 G/1.58G
40 POWDER, FOR SOLUTION ORAL PRN
Status: DISCONTINUED | OUTPATIENT
Start: 2024-01-24 | End: 2024-01-26 | Stop reason: HOSPADM

## 2024-01-24 RX ORDER — POTASSIUM CHLORIDE 7.45 MG/ML
10 INJECTION INTRAVENOUS PRN
Status: DISCONTINUED | OUTPATIENT
Start: 2024-01-24 | End: 2024-01-26 | Stop reason: HOSPADM

## 2024-01-24 RX ORDER — ACETAMINOPHEN 325 MG/1
650 TABLET ORAL EVERY 6 HOURS PRN
Status: DISCONTINUED | OUTPATIENT
Start: 2024-01-24 | End: 2024-01-26 | Stop reason: HOSPADM

## 2024-01-24 RX ORDER — ONDANSETRON 4 MG/1
4 TABLET, ORALLY DISINTEGRATING ORAL EVERY 8 HOURS PRN
Status: DISCONTINUED | OUTPATIENT
Start: 2024-01-24 | End: 2024-01-26 | Stop reason: HOSPADM

## 2024-01-24 RX ORDER — FUROSEMIDE 10 MG/ML
20 INJECTION INTRAMUSCULAR; INTRAVENOUS 2 TIMES DAILY
Status: COMPLETED | OUTPATIENT
Start: 2024-01-24 | End: 2024-01-25

## 2024-01-24 RX ORDER — HEPARIN SODIUM 1000 [USP'U]/ML
60 INJECTION, SOLUTION INTRAVENOUS; SUBCUTANEOUS PRN
Status: DISCONTINUED | OUTPATIENT
Start: 2024-01-24 | End: 2024-01-24 | Stop reason: ALTCHOICE

## 2024-01-24 RX ORDER — ACETAMINOPHEN 325 MG/1
650 TABLET ORAL EVERY 6 HOURS PRN
Status: DISCONTINUED | OUTPATIENT
Start: 2024-01-24 | End: 2024-01-24 | Stop reason: SDUPTHER

## 2024-01-24 RX ORDER — SODIUM CHLORIDE 9 MG/ML
INJECTION, SOLUTION INTRAVENOUS PRN
Status: DISCONTINUED | OUTPATIENT
Start: 2024-01-24 | End: 2024-01-26 | Stop reason: HOSPADM

## 2024-01-24 RX ORDER — SODIUM CHLORIDE 0.9 % (FLUSH) 0.9 %
5-40 SYRINGE (ML) INJECTION EVERY 12 HOURS SCHEDULED
Status: DISCONTINUED | OUTPATIENT
Start: 2024-01-24 | End: 2024-01-26 | Stop reason: HOSPADM

## 2024-01-24 RX ORDER — HEPARIN SODIUM 1000 [USP'U]/ML
60 INJECTION, SOLUTION INTRAVENOUS; SUBCUTANEOUS ONCE
Status: DISCONTINUED | OUTPATIENT
Start: 2024-01-24 | End: 2024-01-24 | Stop reason: ALTCHOICE

## 2024-01-24 RX ORDER — LEVOFLOXACIN 5 MG/ML
750 INJECTION, SOLUTION INTRAVENOUS EVERY 24 HOURS
Status: DISCONTINUED | OUTPATIENT
Start: 2024-01-25 | End: 2024-01-26 | Stop reason: HOSPADM

## 2024-01-24 RX ORDER — ENOXAPARIN SODIUM 100 MG/ML
40 INJECTION SUBCUTANEOUS DAILY
Status: DISCONTINUED | OUTPATIENT
Start: 2024-01-24 | End: 2024-01-24

## 2024-01-24 RX ORDER — QUETIAPINE FUMARATE 25 MG/1
25 TABLET, FILM COATED ORAL NIGHTLY
Status: DISCONTINUED | OUTPATIENT
Start: 2024-01-24 | End: 2024-01-24

## 2024-01-24 RX ORDER — HEPARIN SODIUM 10000 [USP'U]/100ML
5-30 INJECTION, SOLUTION INTRAVENOUS CONTINUOUS
Status: DISCONTINUED | OUTPATIENT
Start: 2024-01-24 | End: 2024-01-24

## 2024-01-24 RX ORDER — MAGNESIUM SULFATE IN WATER 40 MG/ML
2000 INJECTION, SOLUTION INTRAVENOUS PRN
Status: DISCONTINUED | OUTPATIENT
Start: 2024-01-24 | End: 2024-01-26 | Stop reason: HOSPADM

## 2024-01-24 RX ORDER — ACETAMINOPHEN 650 MG/1
650 SUPPOSITORY RECTAL EVERY 6 HOURS PRN
Status: DISCONTINUED | OUTPATIENT
Start: 2024-01-24 | End: 2024-01-26 | Stop reason: HOSPADM

## 2024-01-24 RX ORDER — 0.9 % SODIUM CHLORIDE 0.9 %
1000 INTRAVENOUS SOLUTION INTRAVENOUS ONCE
Status: COMPLETED | OUTPATIENT
Start: 2024-01-24 | End: 2024-01-24

## 2024-01-24 RX ORDER — ONDANSETRON 2 MG/ML
4 INJECTION INTRAMUSCULAR; INTRAVENOUS EVERY 6 HOURS PRN
Status: DISCONTINUED | OUTPATIENT
Start: 2024-01-24 | End: 2024-01-26 | Stop reason: HOSPADM

## 2024-01-24 RX ORDER — LOSARTAN POTASSIUM 50 MG/1
50 TABLET ORAL DAILY
Status: DISCONTINUED | OUTPATIENT
Start: 2024-01-24 | End: 2024-01-25

## 2024-01-24 RX ORDER — POTASSIUM CHLORIDE 20 MEQ/1
40 TABLET, EXTENDED RELEASE ORAL PRN
Status: DISCONTINUED | OUTPATIENT
Start: 2024-01-24 | End: 2024-01-26 | Stop reason: HOSPADM

## 2024-01-24 RX ORDER — POLYETHYLENE GLYCOL 3350 17 G/17G
17 POWDER, FOR SOLUTION ORAL DAILY PRN
Status: DISCONTINUED | OUTPATIENT
Start: 2024-01-24 | End: 2024-01-26 | Stop reason: HOSPADM

## 2024-01-24 RX ORDER — SODIUM CHLORIDE 0.9 % (FLUSH) 0.9 %
5-40 SYRINGE (ML) INJECTION PRN
Status: DISCONTINUED | OUTPATIENT
Start: 2024-01-24 | End: 2024-01-26 | Stop reason: HOSPADM

## 2024-01-24 RX ORDER — HEPARIN SODIUM 1000 [USP'U]/ML
30 INJECTION, SOLUTION INTRAVENOUS; SUBCUTANEOUS PRN
Status: DISCONTINUED | OUTPATIENT
Start: 2024-01-24 | End: 2024-01-24 | Stop reason: ALTCHOICE

## 2024-01-24 RX ORDER — LEVOFLOXACIN 5 MG/ML
750 INJECTION, SOLUTION INTRAVENOUS
Status: COMPLETED | OUTPATIENT
Start: 2024-01-24 | End: 2024-01-24

## 2024-01-24 RX ORDER — ASPIRIN 81 MG/1
81 TABLET, CHEWABLE ORAL DAILY
Status: DISCONTINUED | OUTPATIENT
Start: 2024-01-24 | End: 2024-01-26 | Stop reason: HOSPADM

## 2024-01-24 RX ADMIN — ENOXAPARIN SODIUM 40 MG: 40 INJECTION SUBCUTANEOUS at 10:37

## 2024-01-24 RX ADMIN — LOSARTAN POTASSIUM 50 MG: 50 TABLET, FILM COATED ORAL at 16:09

## 2024-01-24 RX ADMIN — SODIUM CHLORIDE, PRESERVATIVE FREE 10 ML: 5 INJECTION INTRAVENOUS at 20:40

## 2024-01-24 RX ADMIN — APIXABAN 5 MG: 5 TABLET, FILM COATED ORAL at 20:40

## 2024-01-24 RX ADMIN — METOPROLOL TARTRATE 12.5 MG: 25 TABLET, FILM COATED ORAL at 16:11

## 2024-01-24 RX ADMIN — SERTRALINE HYDROCHLORIDE 100 MG: 50 TABLET ORAL at 20:40

## 2024-01-24 RX ADMIN — ASPIRIN 81 MG: 81 TABLET, CHEWABLE ORAL at 16:09

## 2024-01-24 RX ADMIN — FUROSEMIDE 20 MG: 10 INJECTION, SOLUTION INTRAMUSCULAR; INTRAVENOUS at 16:10

## 2024-01-24 RX ADMIN — SODIUM CHLORIDE, PRESERVATIVE FREE 10 ML: 5 INJECTION INTRAVENOUS at 10:38

## 2024-01-24 RX ADMIN — LEVOFLOXACIN 750 MG: 5 INJECTION, SOLUTION INTRAVENOUS at 00:36

## 2024-01-24 RX ADMIN — SODIUM CHLORIDE 1000 ML: 9 INJECTION, SOLUTION INTRAVENOUS at 01:05

## 2024-01-24 ASSESSMENT — PAIN SCALES - PAIN ASSESSMENT IN ADVANCED DEMENTIA (PAINAD)
NEGVOCALIZATION: 0
TOTALSCORE: 0
BREATHING: 0
CONSOLABILITY: 0
BODYLANGUAGE: 0
FACIALEXPRESSION: 0

## 2024-01-24 ASSESSMENT — ENCOUNTER SYMPTOMS
VOMITING: 0
CHEST TIGHTNESS: 1
DIARRHEA: 0
ABDOMINAL PAIN: 0
SHORTNESS OF BREATH: 1

## 2024-01-24 NOTE — ED PROVIDER NOTES
EMERGENCY DEPARTMENT HISTORY AND PHYSICAL EXAM     ----------------------------------------------------------------------------  Please note that this dictation was completed with [employer], the computer voice recognition software.  Quite often unanticipated grammatical, syntax, homophones, and other interpretive errors are inadvertently transcribed by the computer software.  Please disregard these errors.  Please excuse any errors that have escaped final proofreading  ----------------------------------------------------------------------------      Date: 1/23/2024  Patient Name: Jade Ahn      HISTORY OF PRESENT ILLNESS     Chief Complaint   Patient presents with    Respiratory Distress     Patient from EMS- SOB patient was 70% on NRB EMS switched patient over to CPAP at this time with a neb going. SNF also gave the patient a neb at the facility. Patient is baseline dementia. Patient does not wear O2 baseline. Patient is COVID +       History obtainted from:  Patient    Other independent source of history: EMS, Family    HPI: Jade Ahn is a 83 y.o. female, with significant pmhx of vascular dementia, left bundle branch block, hypertension, previous breast cancer, bilateral PEs on Eliquis who presents via EMS from [facility] to the ED with c/o respiratory distress.  Patient was diagnosed with COVID earlier today by her facility and had progressive worsening of her shortness of breath this evening.  Patient was satting in the 70s on room air when found by EMS.  Placed on a nonrebreather without much improvement and transitioned her to CPAP for transport to ED.  Noted to be satting in the 90s on arrival.  Does not wear oxygen at baseline.    Patient has a signed DNR on arrival      PCP: No primary care provider on file.    Allergy List:   Allergies   Allergen Reactions    Codeine Nausea Only    Penicillins Hives         Medications:  Current Facility-Administered Medications   Medication Dose Route  362 (H) 80 - 100 MMHG    POC HCO3 19.7 (L) 22 - 26 MMOL/L    POC O2 SAT 99.9 (H) 92 - 97 %    Base Deficit 7.0 mmol/L    Site RIGHT RADIAL      DEVICE BIPAP MASK      POC PEEP 8 cmH2O    IPAP/PIP/High PEEP 16      POC Anam's Test Positive      Specimen type: ARTERIAL     Procalcitonin    Collection Time: 01/23/24 11:58 PM   Result Value Ref Range    Procalcitonin <0.05 ng/mL   POC Lactic Acid    Collection Time: 01/24/24  1:10 AM   Result Value Ref Range    POC Lactic Acid 3.62 (HH) 0.40 - 2.00 mmol/L   Troponin    Collection Time: 01/24/24  1:12 AM   Result Value Ref Range    Troponin, High Sensitivity 154 (HH) 0 - 51 ng/L           Radiology:  Non-plain film images such as CT, Ultrasound and MRI are read by the radiologist. Plain radiographic images are visualized and preliminarily interpreted by the ED Provider with the findings documented in the MDM section.     Interpretation per the Radiologist below, if available at the time of this note:    Radiologic Studies:  XR CHEST PORTABLE   Final Result      Small left pleural effusion. Mild pulmonary edema.               ED COURSE   I am the first provider for this patient.    Initial assessment performed. The patients presenting problems have been discussed, and they are in agreement with the care plan formulated and outlined with them.  I have encouraged them to ask questions as they arise throughout their visit.    Nursing notes will be reviewed and interpreted by me as they become available in realtime while the pt has been in the ED.      ED Course as of 01/24/24 0229 Wed Jan 24, 2024   0158 CONSULT NOTE:   1:58 AM  Yuki Barroso MD spoke with Dr. Benjamin,   Specialty: Hospitalist  Consulted Dr. Benjamin due to acute respiratory failure.  Discussed pt's HPI and available diagnostic results thus far. Expressed concerns for needed admission.  Consultant will evaluate for admission.  Yuki Barroso MD    ADMISSION NOTE:  1:58 AM  Patient is being admitted to the

## 2024-01-24 NOTE — CONSULTS
Cardiology Consult Note    CC: Hypoxia    PCP:No primary care provider on file.  Reason for consult:  Elevated troponin  Requesting MD:  Dr. Middleton  Admit Date: 1/23/2024   Today's Date: 1/24/2024   Cardiologist:  Dr Lerner.   Cardiac Assessment/Plan:   1) NQWMI by trop 300s; Mild CHF on CXR  2) HTN  3) Chronic LBBB  4) Bilat PE 11/2023, on AC   5) Prior breast CA  6) ?covid at SNF: neg here  7) DNR/I:  Advanced dementia; minimally ambulatory per daughter.    Presenting with dyspnea & hypoxia; pt does not remember either these.  Needed BiPAP, now on nasal cannula.    Likely underlying CAD; with her comorbid illnesses, recommend Med Rx only & daughter is agreeable to no invasive procedures.  Echo for prognosis; gentle diuresis.  Hep gtt back to PO AC per primary team.  Add bblocker; asa.     ____________________________________________________________________  Jade Ahn is a 83 y.o. female presents with Pneumonia due to COVID-19 virus [U07.1, J12.82].    As noted in ER HPI: \"83 y.o. female, with significant pmhx of vascular dementia, left bundle branch block, hypertension, previous breast cancer, bilateral PEs on Eliquis who presents via EMS from [facility] to the ED with c/o respiratory distress.  Patient was diagnosed with COVID earlier today by her facility and had progressive worsening of her shortness of breath this evening.  Patient was satting in the 70s on room air when found by EMS.  Placed on a nonrebreather without much improvement and transitioned her to CPAP for transport to ED.  Noted to be satting in the 90s on arrival.  Does not wear oxygen at baseline.     Patient has a signed DNR on arrival\"    ______________________________________________________________________    The patient cannot prior provide accurate history due to dementia.  She reports no current chest pain or dyspnea; does not know why she is here.    No PND, orthopnea, palpitations, pre-syncope, syncope, peripheral  - 1 %    Immature Granulocytes 1 (H) 0.0 - 0.5 %    Neutrophils Absolute 14.2 (H) 1.8 - 8.0 K/UL    Lymphocytes Absolute 1.4 0.8 - 3.5 K/UL    Monocytes Absolute 0.3 0.0 - 1.0 K/UL    Eosinophils Absolute 0.0 0.0 - 0.4 K/UL    Basophils Absolute 0.0 0.0 - 0.1 K/UL    Absolute Immature Granulocyte 0.1 (H) 0.00 - 0.04 K/UL    Differential Type AUTOMATED     Procalcitonin    Collection Time: 01/24/24 10:26 AM   Result Value Ref Range    Procalcitonin 0.08 ng/mL   EKG 12 Lead    Collection Time: 01/24/24 12:22 PM   Result Value Ref Range    Ventricular Rate 87 BPM    Atrial Rate 87 BPM    P-R Interval 212 ms    QRS Duration 160 ms    Q-T Interval 452 ms    QTc Calculation (Bazett) 543 ms    P Axis 70 degrees    R Axis -17 degrees    T Axis 97 degrees    Diagnosis       Sinus rhythm with 1st degree AV block  Possible Left atrial enlargement  Left bundle branch block  When compared with ECG of 23-JAN-2024 23:13,  T wave inversion less evident in Lateral leads       No results for input(s): \"CPK\", \"CKMB\" in the last 72 hours.    Invalid input(s): \"CKNDX\", \"TROIQ\"    Recent Labs     01/23/24  2323 01/24/24  1026   * 139   K 3.3* 4.1    108   CO2 20* 26   BUN 9 7   WBC 18.4* 16.1*   HGB 14.4 12.9   HCT 47.3* 40.8    265       Silvano Masterson MD

## 2024-01-24 NOTE — H&P
24 0208 135/62 -- -- 90 23 95 % -- --   24 0158 -- -- -- 90 24 96 % -- --   24 0153 -- -- -- 91 25 96 % -- --   24 0148 -- -- -- 94 24 97 % -- --   24 0140 137/78 -- -- 97 22 96 % -- --   24 0130 123/71 -- -- 94 27 98 % -- --   24 0117 131/62 -- -- 98 27 97 % -- --   24 0115 131/62 -- -- -- -- -- -- --   24 0112 -- -- -- 94 28 100 % -- --   24 0015 -- -- -- (!) 101 23 99 % -- --   24 0004 -- -- -- (!) 104 24 99 % -- --   24 2348 -- -- -- (!) 106 25 100 % -- --   24 2330 -- 98.1 °F (36.7 °C) Rectal -- -- -- -- --   245 -- -- -- (!) 121 (!) 33 97 % -- --   246 -- -- -- -- -- -- 1.829 m (6') 82.2 kg (181 lb 3.5 oz)   24 2315 (!) 142/76 -- -- (!) 123 (!) 34 (!) 87 % -- --       Temp (24hrs), Av.1 °F (36.7 °C), Min:98.1 °F (36.7 °C), Max:98.1 °F (36.7 °C)      O2 Flow Rate (L/min): 2 L/min O2 Device: Nasal cannula    Wt Readings from Last 12 Encounters:   24 82.2 kg (181 lb 3.5 oz)   23 81.7 kg (180 lb 1.9 oz)   21 73.9 kg (163 lb)   21 68.9 kg (152 lb)   21 68.9 kg (152 lb)         PHYSICAL EXAM:  General:    Alert, cooperative, appears stated age, on 2l NC     Lungs:   Diminished breath sounds ,no added sounds   Chest wall:  No tenderness.  No accessory muscle use.  Heart:   Regular  rhythm,  No  Murmur.   Right lower extremity edema abdomen:   Soft, NT. ND  BS+  Extremities: No cyanosis.  No clubbing,      Skin turgor normal, Radial dial pulse 2+. Capillary refill normal  Neurologic: No facial asymmetry. No aphasia or slurred speech. Symmetrical strength, Sensation grossly intact. AAOx2.         LAB DATA REVIEWED:    Recent Results (from the past 12 hour(s))   Lactic Acid    Collection Time: 24  2:28 AM   Result Value Ref Range    Lactic Acid, Plasma 2.8 (HH) 0.4 - 2.0 MMOL/L   C-Reactive Protein    Collection Time: 24  2:28 AM   Result Value Ref Range    CRP <0.29  0.00 - 0.30 mg/dL   Lactic Acid    Collection Time: 01/24/24 10:25 AM   Result Value Ref Range    Lactic Acid, Plasma 1.3 0.4 - 2.0 MMOL/L   Troponin    Collection Time: 01/24/24 10:25 AM   Result Value Ref Range    Troponin, High Sensitivity 303 (HH) 0 - 51 ng/L   Basic Metabolic Panel w/ Reflex to MG    Collection Time: 01/24/24 10:26 AM   Result Value Ref Range    Sodium 139 136 - 145 mmol/L    Potassium 4.1 3.5 - 5.1 mmol/L    Chloride 108 97 - 108 mmol/L    CO2 26 21 - 32 mmol/L    Anion Gap 5 5 - 15 mmol/L    Glucose 143 (H) 65 - 100 mg/dL    BUN 7 6 - 20 MG/DL    Creatinine 0.62 0.55 - 1.02 MG/DL    Bun/Cre Ratio 11 (L) 12 - 20      Est, Glom Filt Rate >60 >60 ml/min/1.73m2    Calcium 8.7 8.5 - 10.1 MG/DL   CBC with Auto Differential    Collection Time: 01/24/24 10:26 AM   Result Value Ref Range    WBC 16.1 (H) 3.6 - 11.0 K/uL    RBC 4.68 3.80 - 5.20 M/uL    Hemoglobin 12.9 11.5 - 16.0 g/dL    Hematocrit 40.8 35.0 - 47.0 %    MCV 87.2 80.0 - 99.0 FL    MCH 27.6 26.0 - 34.0 PG    MCHC 31.6 30.0 - 36.5 g/dL    RDW 15.7 (H) 11.5 - 14.5 %    Platelets 265 150 - 400 K/uL    MPV 9.9 8.9 - 12.9 FL    Nucleated RBCs 0.0 0  WBC    nRBC 0.00 0.00 - 0.01 K/uL    Neutrophils % 88 (H) 32 - 75 %    Lymphocytes % 9 (L) 12 - 49 %    Monocytes % 2 (L) 5 - 13 %    Eosinophils % 0 0 - 7 %    Basophils % 0 0 - 1 %    Immature Granulocytes 1 (H) 0.0 - 0.5 %    Neutrophils Absolute 14.2 (H) 1.8 - 8.0 K/UL    Lymphocytes Absolute 1.4 0.8 - 3.5 K/UL    Monocytes Absolute 0.3 0.0 - 1.0 K/UL    Eosinophils Absolute 0.0 0.0 - 0.4 K/UL    Basophils Absolute 0.0 0.0 - 0.1 K/UL    Absolute Immature Granulocyte 0.1 (H) 0.00 - 0.04 K/UL    Differential Type AUTOMATED     Procalcitonin    Collection Time: 01/24/24 10:26 AM   Result Value Ref Range    Procalcitonin 0.08 ng/mL   EKG 12 Lead    Collection Time: 01/24/24 12:22 PM   Result Value Ref Range    Ventricular Rate 87 BPM    Atrial Rate 87 BPM    P-R Interval 212 ms    QRS

## 2024-01-24 NOTE — PLAN OF CARE
Problem: Respiratory - Adult  Goal: Achieves optimal ventilation and oxygenation  Outcome: Progressing     Problem: Skin/Tissue Integrity  Goal: Absence of new skin breakdown  Description: 1.  Monitor for areas of redness and/or skin breakdown  2.  Assess vascular access sites hourly  3.  Every 4-6 hours minimum:  Change oxygen saturation probe site  4.  Every 4-6 hours:  If on nasal continuous positive airway pressure, respiratory therapy assess nares and determine need for appliance change or resting period.  Outcome: Progressing     Problem: Safety - Adult  Goal: Free from fall injury  Outcome: Progressing     Problem: Discharge Planning  Goal: Discharge to home or other facility with appropriate resources  Outcome: Progressing     Problem: Pain  Goal: Verbalizes/displays adequate comfort level or baseline comfort level  Outcome: Progressing

## 2024-01-24 NOTE — ED NOTES
2315: Assumed care of patient by EMS. Patient brought in by EMS on CPAP at this time. Patient from [facility] that called for SOB. SNF gave the patient a Neb at facility with no relief. EMS also gave the patient a neb on CPAP. Patient is baseline dementia and does not wear oxygen at nursing home. MD Barroso bedside at this time. RT bedside at this time.     2319: Patient placed on BiPap at this time    2325: Patient straight cathed at this time, placed on purewick    2351: This RN bedside, Patient is alert and oriented to person place and time, disoriented to what brought her here. Patient is denying pain at this time. Patient denying CP as well. Patient states the BiPap is helping her breathing.    2354: Patients daughter is bedside at this time    0014: XRAY bedside at this time    0115: Patient off BiPap at this time, Patient placed on 4 L NC by RT    0131: Patient drinking water at this time    0136: MD Barroso bedside    0713: Bedside and Verbal shift change report given to Mis THAYER (oncoming nurse) by Flora  (offgoing nurse). Report included the following information Nurse Handoff Report.

## 2024-01-25 ENCOUNTER — APPOINTMENT (OUTPATIENT)
Facility: HOSPITAL | Age: 84
End: 2024-01-25
Attending: INTERNAL MEDICINE
Payer: MEDICARE

## 2024-01-25 LAB
ANION GAP SERPL CALC-SCNC: 8 MMOL/L (ref 5–15)
BACTERIA SPEC CULT: NORMAL
BUN SERPL-MCNC: 15 MG/DL (ref 6–20)
BUN/CREAT SERPL: 19 (ref 12–20)
CALCIUM SERPL-MCNC: 8.6 MG/DL (ref 8.5–10.1)
CC UR VC: NORMAL
CHLORIDE SERPL-SCNC: 106 MMOL/L (ref 97–108)
CO2 SERPL-SCNC: 22 MMOL/L (ref 21–32)
CREAT SERPL-MCNC: 0.79 MG/DL (ref 0.55–1.02)
CRP SERPL-MCNC: 0.75 MG/DL (ref 0–0.3)
ECHO AO ROOT DIAM: 3.2 CM
ECHO AO ROOT INDEX: 1.57 CM/M2
ECHO AV AREA PEAK VELOCITY: 2.3 CM2
ECHO AV AREA VTI: 2.3 CM2
ECHO AV AREA/BSA PEAK VELOCITY: 1.1 CM2/M2
ECHO AV AREA/BSA VTI: 1.1 CM2/M2
ECHO AV MEAN GRADIENT: 8 MMHG
ECHO AV MEAN VELOCITY: 1.3 M/S
ECHO AV PEAK GRADIENT: 13 MMHG
ECHO AV PEAK VELOCITY: 1.8 M/S
ECHO AV VELOCITY RATIO: 0.72
ECHO AV VTI: 36.2 CM
ECHO BSA: 2.04 M2
ECHO LA DIAMETER INDEX: 2.11 CM/M2
ECHO LA DIAMETER: 4.3 CM
ECHO LA TO AORTIC ROOT RATIO: 1.34
ECHO LA VOL A-L A4C: 72 ML (ref 22–52)
ECHO LA VOL MOD A4C: 68 ML (ref 22–52)
ECHO LA VOLUME INDEX A-L A4C: 35 ML/M2 (ref 16–34)
ECHO LA VOLUME INDEX MOD A4C: 33 ML/M2 (ref 16–34)
ECHO LV E' LATERAL VELOCITY: 3 CM/S
ECHO LV E' SEPTAL VELOCITY: 3 CM/S
ECHO LV EDV A4C: 104 ML
ECHO LV EDV INDEX A4C: 51 ML/M2
ECHO LV EJECTION FRACTION A4C: 58 %
ECHO LV ESV A4C: 43 ML
ECHO LV ESV INDEX A4C: 21 ML/M2
ECHO LV FRACTIONAL SHORTENING: 16 % (ref 28–44)
ECHO LV INTERNAL DIMENSION DIASTOLE INDEX: 2.11 CM/M2
ECHO LV INTERNAL DIMENSION DIASTOLIC: 4.3 CM (ref 3.9–5.3)
ECHO LV INTERNAL DIMENSION SYSTOLIC INDEX: 1.76 CM/M2
ECHO LV INTERNAL DIMENSION SYSTOLIC: 3.6 CM
ECHO LV IVSD: 1.2 CM (ref 0.6–0.9)
ECHO LV MASS 2D: 173.6 G (ref 67–162)
ECHO LV MASS INDEX 2D: 85.1 G/M2 (ref 43–95)
ECHO LV POSTERIOR WALL DIASTOLIC: 1.1 CM (ref 0.6–0.9)
ECHO LV RELATIVE WALL THICKNESS RATIO: 0.51
ECHO LVOT AREA: 3.1 CM2
ECHO LVOT AV VTI INDEX: 0.75
ECHO LVOT DIAM: 2 CM
ECHO LVOT MEAN GRADIENT: 4 MMHG
ECHO LVOT PEAK GRADIENT: 7 MMHG
ECHO LVOT PEAK VELOCITY: 1.3 M/S
ECHO LVOT STROKE VOLUME INDEX: 42 ML/M2
ECHO LVOT SV: 85.7 ML
ECHO LVOT VTI: 27.3 CM
ECHO MV A VELOCITY: 1.69 M/S
ECHO MV AREA PHT: 4 CM2
ECHO MV AREA VTI: 1.8 CM2
ECHO MV E DECELERATION TIME (DT): 197.2 MS
ECHO MV E VELOCITY: 1.82 M/S
ECHO MV E/A RATIO: 1.08
ECHO MV E/E' LATERAL: 60.67
ECHO MV E/E' RATIO (AVERAGED): 60.67
ECHO MV LVOT VTI INDEX: 1.73
ECHO MV MAX VELOCITY: 1.9 M/S
ECHO MV MEAN GRADIENT: 7 MMHG
ECHO MV MEAN VELOCITY: 1.2 M/S
ECHO MV PEAK GRADIENT: 14 MMHG
ECHO MV PRESSURE HALF TIME (PHT): 55 MS
ECHO MV REGURGITANT PEAK GRADIENT: 121 MMHG
ECHO MV REGURGITANT PEAK VELOCITY: 5.5 M/S
ECHO MV REGURGITANT VTIA: 186.9 CM
ECHO MV VTI: 47.3 CM
ECHO PULMONARY ARTERY END DIASTOLIC PRESSURE: 8 MMHG
ECHO PV REGURGITANT MAX VELOCITY: 1.4 M/S
ECHO RA VOLUME: 43 ML
ECHO RA VOLUME: 45 ML
ECHO RV TAPSE: 1.5 CM (ref 1.7–?)
ECHO TV REGURGITANT MAX VELOCITY: 3.23 M/S
ECHO TV REGURGITANT PEAK GRADIENT: 43 MMHG
EKG ATRIAL RATE: 87 BPM
EKG DIAGNOSIS: NORMAL
EKG P AXIS: 70 DEGREES
EKG P-R INTERVAL: 212 MS
EKG Q-T INTERVAL: 452 MS
EKG QRS DURATION: 160 MS
EKG QTC CALCULATION (BAZETT): 543 MS
EKG R AXIS: -17 DEGREES
EKG T AXIS: 97 DEGREES
EKG VENTRICULAR RATE: 87 BPM
GLUCOSE SERPL-MCNC: 153 MG/DL (ref 65–100)
POTASSIUM SERPL-SCNC: 3.4 MMOL/L (ref 3.5–5.1)
PROCALCITONIN SERPL-MCNC: 0.12 NG/ML
SERVICE CMNT-IMP: NORMAL
SODIUM SERPL-SCNC: 136 MMOL/L (ref 136–145)
UFH PPP CHRO-ACNC: 0.31 IU/ML

## 2024-01-25 PROCEDURE — 6370000000 HC RX 637 (ALT 250 FOR IP): Performed by: HOSPITALIST

## 2024-01-25 PROCEDURE — 36415 COLL VENOUS BLD VENIPUNCTURE: CPT

## 2024-01-25 PROCEDURE — 1100000003 HC PRIVATE W/ TELEMETRY

## 2024-01-25 PROCEDURE — 2580000003 HC RX 258: Performed by: STUDENT IN AN ORGANIZED HEALTH CARE EDUCATION/TRAINING PROGRAM

## 2024-01-25 PROCEDURE — 6370000000 HC RX 637 (ALT 250 FOR IP): Performed by: STUDENT IN AN ORGANIZED HEALTH CARE EDUCATION/TRAINING PROGRAM

## 2024-01-25 PROCEDURE — 80048 BASIC METABOLIC PNL TOTAL CA: CPT

## 2024-01-25 PROCEDURE — 94760 N-INVAS EAR/PLS OXIMETRY 1: CPT

## 2024-01-25 PROCEDURE — C8929 TTE W OR WO FOL WCON,DOPPLER: HCPCS

## 2024-01-25 PROCEDURE — 84145 PROCALCITONIN (PCT): CPT

## 2024-01-25 PROCEDURE — 86140 C-REACTIVE PROTEIN: CPT

## 2024-01-25 PROCEDURE — 85520 HEPARIN ASSAY: CPT

## 2024-01-25 PROCEDURE — 6360000004 HC RX CONTRAST MEDICATION: Performed by: STUDENT IN AN ORGANIZED HEALTH CARE EDUCATION/TRAINING PROGRAM

## 2024-01-25 PROCEDURE — 2700000000 HC OXYGEN THERAPY PER DAY

## 2024-01-25 PROCEDURE — 6370000000 HC RX 637 (ALT 250 FOR IP): Performed by: INTERNAL MEDICINE

## 2024-01-25 PROCEDURE — 6360000002 HC RX W HCPCS: Performed by: INTERNAL MEDICINE

## 2024-01-25 PROCEDURE — 6360000002 HC RX W HCPCS: Performed by: STUDENT IN AN ORGANIZED HEALTH CARE EDUCATION/TRAINING PROGRAM

## 2024-01-25 PROCEDURE — 92610 EVALUATE SWALLOWING FUNCTION: CPT

## 2024-01-25 RX ORDER — SPIRONOLACTONE 25 MG/1
12.5 TABLET ORAL DAILY
Status: DISCONTINUED | OUTPATIENT
Start: 2024-01-26 | End: 2024-01-26 | Stop reason: HOSPADM

## 2024-01-25 RX ORDER — LOSARTAN POTASSIUM 25 MG/1
25 TABLET ORAL DAILY
Status: DISCONTINUED | OUTPATIENT
Start: 2024-01-26 | End: 2024-01-26 | Stop reason: HOSPADM

## 2024-01-25 RX ORDER — METOPROLOL SUCCINATE 25 MG/1
25 TABLET, EXTENDED RELEASE ORAL DAILY
Status: DISCONTINUED | OUTPATIENT
Start: 2024-01-26 | End: 2024-01-26 | Stop reason: HOSPADM

## 2024-01-25 RX ORDER — LANOLIN ALCOHOL/MO/W.PET/CERES
6 CREAM (GRAM) TOPICAL NIGHTLY PRN
Status: DISCONTINUED | OUTPATIENT
Start: 2024-01-25 | End: 2024-01-26 | Stop reason: HOSPADM

## 2024-01-25 RX ADMIN — MELATONIN 6 MG: at 20:33

## 2024-01-25 RX ADMIN — APIXABAN 5 MG: 5 TABLET, FILM COATED ORAL at 11:08

## 2024-01-25 RX ADMIN — LEVOFLOXACIN 750 MG: 5 INJECTION, SOLUTION INTRAVENOUS at 11:32

## 2024-01-25 RX ADMIN — POTASSIUM CHLORIDE 40 MEQ: 1.5 POWDER, FOR SOLUTION ORAL at 11:32

## 2024-01-25 RX ADMIN — SODIUM CHLORIDE, PRESERVATIVE FREE 10 ML: 5 INJECTION INTRAVENOUS at 11:08

## 2024-01-25 RX ADMIN — ASPIRIN 81 MG: 81 TABLET, CHEWABLE ORAL at 11:08

## 2024-01-25 RX ADMIN — SODIUM CHLORIDE, PRESERVATIVE FREE 10 ML: 5 INJECTION INTRAVENOUS at 20:37

## 2024-01-25 RX ADMIN — SERTRALINE HYDROCHLORIDE 100 MG: 50 TABLET ORAL at 20:34

## 2024-01-25 RX ADMIN — FUROSEMIDE 20 MG: 10 INJECTION, SOLUTION INTRAMUSCULAR; INTRAVENOUS at 11:08

## 2024-01-25 RX ADMIN — APIXABAN 5 MG: 5 TABLET, FILM COATED ORAL at 20:34

## 2024-01-25 RX ADMIN — PERFLUTREN 1.5 ML: 6.52 INJECTION, SUSPENSION INTRAVENOUS at 10:21

## 2024-01-25 RX ADMIN — MELATONIN 6 MG: at 00:26

## 2024-01-25 ASSESSMENT — PAIN SCALES - PAIN ASSESSMENT IN ADVANCED DEMENTIA (PAINAD)
BODYLANGUAGE: 0
BREATHING: 0
NEGVOCALIZATION: 0
CONSOLABILITY: 0
TOTALSCORE: 0
BREATHING: 0
CONSOLABILITY: 0
TOTALSCORE: 0
FACIALEXPRESSION: 0
NEGVOCALIZATION: 0
FACIALEXPRESSION: 0
BODYLANGUAGE: 0

## 2024-01-25 NOTE — PROGRESS NOTES
Cardiology Progress Note  1/25/2024     Admit Date: 1/23/2024  Admit Diagnosis: Pleural effusion [J90]  History of pulmonary embolism [Z86.711]  Acute cystitis without hematuria [N30.00]  Acute respiratory failure with hypoxia (HCC) [J96.01]  Pneumonia due to COVID-19 virus [U07.1, J12.82]  CC: none currently  Cardiologist:  Dr Lerner.       Cardiac Assessment/Plan:    1) NQWMI by trop 300s; Mild CHF on CXR  2) HTN  3) Chronic LBBB  4) Bilat PE 11/2023, on AC   5) Prior breast CA  6) ?covid at SNF: neg here  7) DNR/I:  Advanced dementia; minimally ambulatory per daughter.     Presenting with dyspnea & hypoxia; pt does not remember either these.  Needed BiPAP, now on nasal cannula.     Likely underlying CAD; with her comorbid illnesses, recommend Med Rx only & daughter is agreeable to no invasive procedures.  Echo for prognosis; gentle diuresis.  Hep gtt back to PO AC per primary team.  Add bblocker; asa.      1/25: Cont dementia; no current CP/dyspnea  -160s; HR 80-90s; sinus/PACs; 92%RA  K 3.4; Cr 0.8    Cardiac med: asa/eliquis; losartan 50; metoprolol 12.5 bid    Rec: metoprolol to XL; Add low dose aldactone.  Echo pending. No stress test/invasive cardiac testing: Med Rx.  ____________________________________________________________________  Jade Ahn is a 83 y.o. female presents with Pneumonia due to COVID-19 virus [U07.1, J12.82].     As noted in ER HPI: \"83 y.o. female, with significant pmhx of vascular dementia, left bundle branch block, hypertension, previous breast cancer, bilateral PEs on Eliquis who presents via EMS from [facility] to the ED with c/o respiratory distress.  Patient was diagnosed with COVID earlier today by her facility and had progressive worsening of her shortness of breath this evening.  Patient was satting in the 70s on room air when found by EMS.  Placed on a nonrebreather without much improvement and transitioned her to CPAP for transport to  [facility]

## 2024-01-25 NOTE — PROGRESS NOTES
Speech LAnguage Pathology EVALUATION/DISCHARGE    Patient: Jade Ahn (83 y.o. female)  Date: 1/25/2024  Primary Diagnosis: Pleural effusion [J90]  History of pulmonary embolism [Z86.711]  Acute cystitis without hematuria [N30.00]  Acute respiratory failure with hypoxia (HCC) [J96.01]  Pneumonia due to COVID-19 virus [U07.1, J12.82]       Precautions:                     ASSESSMENT :  Based on the objective data described below, the patient presents with a functional oropharyngeal swallow. Patient reports she eats a regular diet at baseline. Patient's mastication assessed to be timely and complete. Patient self-fed single and sequential sips of thin liquid via straw. No overt difficulty or overt signs of aspiration observed with any consistency trialed.     Patient will be discharged from skilled speech-language pathology services at this time.     PLAN :  Recommendations and Planned Interventions:  Diet: Regular and thin liquids  -- Medication as tolerated  -- Routine oral care, as tolerated  -- Assistance with meal set-up as needed       Acute SLP Services: No, patient will be discharged from acute skilled speech-language pathology at this time.    Discharge Recommendations: No, additional SLP treatment not indicated at discharge     SUBJECTIVE:   Patient stated, “my  .”    OBJECTIVE:     Past Medical History:   Diagnosis Date    Alzheimer's dementia without behavioral disturbance (HCC) 8/3/2019    Essential hypertension     Family history of skin cancer     brother    LBBB (left bundle branch block) 8/3/2019    Radiation exposure     breast cancer left    Skin cancer      Past Surgical History:   Procedure Laterality Date    AL CARDIAC SURG PROCEDURE UNLIST       shunt     Prior Level of Function/Home Situation:   Social/Functional History  Lives With:  (lives at [facility] facilty)    Baseline Assessment:  Current Diet : Regular  Current Liquid Diet : Thin     Patient Complaint: None  stated.    Cognitive and Communication Status:  Neurologic State: Alert  Orientation Level: Oriented to person and Oriented to place  Cognition: Follows commands (simple, 1-step oral motor)    Dysphagia:  Oral Assessment:  Oral Motor   Labial: No impairment  Dentition: Natural;Full  Lingual: No impairment  Velum: Unable to visualize  Mandible: No impairment  P.O. Trials:  PO Trials  Assessment Method(s): Observation  Patient Position: Upright in bed  Vocal Quality: No Impairment  Consistency Presented: Thin;Regular  How Presented: Self-fed/presented;Cup/sip;Successive Swallows  Bolus Acceptance: No impairment  Bolus Formation/Control: No impairment  Propulsion: No impairment  Oral Residue: None  Initiation of Swallow:  (Present)  Aspiration Signs/Symptoms: None  Pharyngeal Phase Characteristics: No impairment, issues, or problems            Respiratory Status/Airway:  Room air                           Functional Oral Intake Scale (FOIS): 7--Total oral diet with no restrictions    After treatment:   Patient left in no apparent distress in bed, Call bell left within reach, and Nursing notified    COMMUNICATION/EDUCATION:   Patient was educated regarding role of SLP. She demonstrated Good understanding as evidenced by Verbalizing understanding.    The patient's plan of care including recommendations, planned interventions, and recommended diet changes were discussed with: Registered nurse    Patient/family have participated as able in goal setting and plan of care and Patient/family agree to work toward stated goals and plan of care    Thank you,  AVI Vinson  Minutes: 12

## 2024-01-25 NOTE — PROGRESS NOTES
Hospitalist Progress Note    NAME:   Jade Ahn   : 1940   MRN: [id number]     Date/Time: 2024 6:30 PM  Patient PCP: No primary care provider on file.    Estimated discharge date:   Barriers: O2 weaning      Assessment / Plan:    Acute hypoxic respiratory failure requiring high oxygen supplementation  Sepsis likely due to UTI  Elevated lactic-normal  Leukocytosis  COVID-negative in ED, as per nursing home note COVID-positive   flu negative     S/p levofloxacin in ED  Allergic to penicillin-continue with levofloxacin for possible UTI  Blood cultures NGTD  Urine cultures 2 possible organisms so likely contamination, however noted on previous admissions leukocytosis was fairly persistent, will need close outpatient follow up/work up     Elevated troponin   EKG with left bundle branch block-no significant change from prior EKG  Possible type II MI   Hypertension  Chest x-ray: Mild left pleural effusion, pulmonary edema  -Eliquis continued  -Echo  -Aspirin  -Metoprolol tartrate 12.5 Mg twice daily  -Losartan 50 Mg once daily  -Furosemide 20 IV twice daily  -Cardio recommendation appreciated  -No intervention at present     Hyponatremia- resolved  Hypokalemia -resolved  -Continue trending electrolytes     Alzheimer's dementia  Hydrocephalus  -Continue with sertraline  -Not on any dementia medications     Medical Decision Making:   I personally reviewed labs: CBC, CMP  I personally reviewed imaging:  I personally reviewed EKG:  Toxic drug monitoring:   Discussed case with:         Code Status: DNR  DVT Prophylaxis: Eliquis   GI Prophylaxis:    Subjective:     Chief Complaint / Reason for Physician Visit  Alert, disoriented.  No current complaints.   Discussed with RN events overnight.       Objective:     VITALS:   Last 24hrs VS reviewed since prior progress note. Most recent are:  Patient Vitals for the past 24 hrs:   BP Temp Temp src Pulse Resp SpO2 Height Weight   24 1525 124/71 98.5

## 2024-01-25 NOTE — CARE COORDINATION
Care Management Initial Assessment       RUR: 15%  Readmission? No  1st IM letter given? Yes - 1/24/2024  1st  letter given: No       01/25/24 1508   Service Assessment   Patient Orientation Alert and Oriented   Cognition Alert   History Provided By Patient   Primary Caregiver   (resides at [facility])   Support Systems Children   Patient's Healthcare Decision Maker is: Named in Scanned ACP Document   PCP Verified by CM Yes  (seen by MD at [facility])   Last Visit to PCP Within last 3 months   Prior Functional Level Assistance with the following:;Bathing;Dressing;Toileting;Feeding;Cooking;Housework;Shopping;Mobility   Current Functional Level Assistance with the following:;Bathing;Dressing;Toileting;Feeding;Cooking;Housework;Shopping;Mobility   Can patient return to prior living arrangement Yes   Ability to make needs known: Good   Would you like for me to discuss the discharge plan with any other family members/significant others, and if so, who? Yes  (Anila Chowmarlonharitha dtr [phone])   Financial Resources Medicare;Medicaid   Social/Functional History   Lives With   (lives at [facility] facilty)   Discharge Planning   Patient expects to be discharged to: Long-term care  ([facility])   Condition of Participation: Discharge Planning   The Plan for Transition of Care is related to the following treatment goals: return to [facility]     CM met with patient - lives at [facility] and plans to return upon discharge- patient is bedbound and will need BLS transport to return - referral made to [facility] via cc link and message left informing facilty she will likely return to her room at facility tomorrow.  JAMAR ODELL, MSW  [phone]

## 2024-01-25 NOTE — PROGRESS NOTES
Speech Pathology Note    SLP orders received. Chart reviewed and discussed with RN. Attempted SLP visit, however ultrasound currently at bedside. Will defer and continue to follow.    Taran Guevara M.S., CCC-SLP

## 2024-01-25 NOTE — PLAN OF CARE
Problem: Respiratory - Adult  Goal: Achieves optimal ventilation and oxygenation  1/24/2024 2306 by Elizabeth Palacios RN  Outcome: Progressing  1/24/2024 1743 by Chadd Aquino RN  Outcome: Progressing     Problem: Skin/Tissue Integrity  Goal: Absence of new skin breakdown  Description: 1.  Monitor for areas of redness and/or skin breakdown  2.  Assess vascular access sites hourly  3.  Every 4-6 hours minimum:  Change oxygen saturation probe site  4.  Every 4-6 hours:  If on nasal continuous positive airway pressure, respiratory therapy assess nares and determine need for appliance change or resting period.  1/24/2024 2306 by Elizabeth Palacios RN  Outcome: Progressing  1/24/2024 1743 by Chadd Aquino RN  Outcome: Progressing     Problem: Safety - Adult  Goal: Free from fall injury  1/24/2024 2306 by Elizabeth Palacios RN  Outcome: Progressing  1/24/2024 1743 by Chadd Aquino RN  Outcome: Progressing     Problem: Discharge Planning  Goal: Discharge to home or other facility with appropriate resources  1/24/2024 2306 by Elizabeth Palacios RN  Outcome: Progressing  1/24/2024 1743 by Chadd Aquino RN  Outcome: Progressing     Problem: Pain  Goal: Verbalizes/displays adequate comfort level or baseline comfort level  1/24/2024 2306 by Elizabeth Palacios RN  Outcome: Progressing  1/24/2024 1743 by Chadd Aquino RN  Outcome: Progressing

## 2024-01-25 NOTE — PROGRESS NOTES
Bedside shift change report given to Slime RN (oncoming nurse) by Elizabeth RN (offgoing nurse). Report included the following information Nurse Handoff Report, Intake/Output, MAR, and Recent Results.      -Pt had a calm night, PRN melatonin given.   -Labs drawn    At handoff report, pt is awake in bed. No complaints at this time.

## 2024-01-26 VITALS
OXYGEN SATURATION: 94 % | HEIGHT: 72 IN | HEART RATE: 85 BPM | WEIGHT: 181 LBS | BODY MASS INDEX: 24.52 KG/M2 | TEMPERATURE: 98.2 F | DIASTOLIC BLOOD PRESSURE: 69 MMHG | SYSTOLIC BLOOD PRESSURE: 146 MMHG | RESPIRATION RATE: 17 BRPM

## 2024-01-26 LAB
ANION GAP SERPL CALC-SCNC: 5 MMOL/L (ref 5–15)
BUN SERPL-MCNC: 15 MG/DL (ref 6–20)
BUN/CREAT SERPL: 19 (ref 12–20)
CALCIUM SERPL-MCNC: 9.1 MG/DL (ref 8.5–10.1)
CHLORIDE SERPL-SCNC: 106 MMOL/L (ref 97–108)
CO2 SERPL-SCNC: 27 MMOL/L (ref 21–32)
CREAT SERPL-MCNC: 0.8 MG/DL (ref 0.55–1.02)
ERYTHROCYTE [DISTWIDTH] IN BLOOD BY AUTOMATED COUNT: 15.9 % (ref 11.5–14.5)
GLUCOSE SERPL-MCNC: 115 MG/DL (ref 65–100)
HCT VFR BLD AUTO: 38.5 % (ref 35–47)
HGB BLD-MCNC: 12.4 G/DL (ref 11.5–16)
MCH RBC QN AUTO: 28.3 PG (ref 26–34)
MCHC RBC AUTO-ENTMCNC: 32.2 G/DL (ref 30–36.5)
MCV RBC AUTO: 87.9 FL (ref 80–99)
NRBC # BLD: 0 K/UL (ref 0–0.01)
NRBC BLD-RTO: 0 PER 100 WBC
PLATELET # BLD AUTO: 226 K/UL (ref 150–400)
PMV BLD AUTO: 9.5 FL (ref 8.9–12.9)
POTASSIUM SERPL-SCNC: 3.7 MMOL/L (ref 3.5–5.1)
RBC # BLD AUTO: 4.38 M/UL (ref 3.8–5.2)
SODIUM SERPL-SCNC: 138 MMOL/L (ref 136–145)
WBC # BLD AUTO: 10.6 K/UL (ref 3.6–11)

## 2024-01-26 PROCEDURE — 36415 COLL VENOUS BLD VENIPUNCTURE: CPT

## 2024-01-26 PROCEDURE — 6370000000 HC RX 637 (ALT 250 FOR IP): Performed by: STUDENT IN AN ORGANIZED HEALTH CARE EDUCATION/TRAINING PROGRAM

## 2024-01-26 PROCEDURE — 85027 COMPLETE CBC AUTOMATED: CPT

## 2024-01-26 PROCEDURE — 6360000002 HC RX W HCPCS: Performed by: STUDENT IN AN ORGANIZED HEALTH CARE EDUCATION/TRAINING PROGRAM

## 2024-01-26 PROCEDURE — 2580000003 HC RX 258: Performed by: STUDENT IN AN ORGANIZED HEALTH CARE EDUCATION/TRAINING PROGRAM

## 2024-01-26 PROCEDURE — 80048 BASIC METABOLIC PNL TOTAL CA: CPT

## 2024-01-26 PROCEDURE — 94760 N-INVAS EAR/PLS OXIMETRY 1: CPT

## 2024-01-26 PROCEDURE — 6370000000 HC RX 637 (ALT 250 FOR IP): Performed by: INTERNAL MEDICINE

## 2024-01-26 RX ORDER — LEVOFLOXACIN 750 MG/1
750 TABLET, FILM COATED ORAL DAILY
Qty: 4 TABLET | Refills: 0 | Status: SHIPPED | OUTPATIENT
Start: 2024-01-26 | End: 2024-01-30

## 2024-01-26 RX ORDER — ASPIRIN 81 MG/1
81 TABLET, CHEWABLE ORAL DAILY
Qty: 30 TABLET | Refills: 0 | Status: SHIPPED | OUTPATIENT
Start: 2024-01-27

## 2024-01-26 RX ORDER — METOPROLOL SUCCINATE 25 MG/1
25 TABLET, EXTENDED RELEASE ORAL DAILY
Qty: 30 TABLET | Refills: 0 | Status: SHIPPED | OUTPATIENT
Start: 2024-01-27

## 2024-01-26 RX ORDER — LOSARTAN POTASSIUM 25 MG/1
25 TABLET ORAL DAILY
Qty: 30 TABLET | Refills: 0 | Status: SHIPPED | OUTPATIENT
Start: 2024-01-27

## 2024-01-26 RX ORDER — SPIRONOLACTONE 25 MG/1
12.5 TABLET ORAL DAILY
Qty: 30 TABLET | Refills: 0 | Status: SHIPPED | OUTPATIENT
Start: 2024-01-27

## 2024-01-26 RX ADMIN — SODIUM CHLORIDE, PRESERVATIVE FREE 10 ML: 5 INJECTION INTRAVENOUS at 11:09

## 2024-01-26 RX ADMIN — ASPIRIN 81 MG: 81 TABLET, CHEWABLE ORAL at 11:09

## 2024-01-26 RX ADMIN — METOPROLOL SUCCINATE 25 MG: 25 TABLET, EXTENDED RELEASE ORAL at 11:09

## 2024-01-26 RX ADMIN — LEVOFLOXACIN 750 MG: 5 INJECTION, SOLUTION INTRAVENOUS at 11:17

## 2024-01-26 RX ADMIN — APIXABAN 5 MG: 5 TABLET, FILM COATED ORAL at 11:09

## 2024-01-26 RX ADMIN — SPIRONOLACTONE 12.5 MG: 25 TABLET ORAL at 11:09

## 2024-01-26 RX ADMIN — SODIUM CHLORIDE: 9 INJECTION, SOLUTION INTRAVENOUS at 11:17

## 2024-01-26 RX ADMIN — LOSARTAN POTASSIUM 25 MG: 25 TABLET, FILM COATED ORAL at 11:09

## 2024-01-26 NOTE — PROGRESS NOTES
End of Shift Note    Bedside shift change report given to ANNA Ny (oncoming nurse) by Tracy Snow LPN (offgoing nurse).  Report included the following information SBAR, Kardex, Intake/Output, MAR, Accordion, Recent Results, Med Rec Status, and Cardiac Rhythm NSR    Shift worked:  7069-1073     Shift summary and any significant changes:     Labs collected. No complaints of pain during shift. No complaints at the time of shift change. Plan of care ongoing.   Concerns for physician to address:  N/A     Zone phone for oncoming shift:   [phone]       Activity:     Number times ambulated in hallways past shift: 0  Number of times OOB to chair past shift: 0    Cardiac:   Cardiac Monitoring: Yes           Access:  Current line(s): PIV     Genitourinary:   Urinary status: incontinent and external catheter    Respiratory:      Chronic home O2 use?: NO  Incentive spirometer at bedside: YES       GI:     Current diet:  ADULT DIET; Regular; Low Fat/Low Chol/High Fiber/2 gm Na  Passing flatus: YES  Tolerating current diet: YES       Pain Management:   Patient states pain is manageable on current regimen: YES    Skin:     Interventions: float heels, increase time out of bed, PT/OT consult, limit briefs, and internal/external urinary devices    Patient Safety:  Fall Score:    Interventions: bed/chair alarm, assistive device (walker, cane. etc), gripper socks, pt to call before getting OOB, and stay with me (per policy)       Length of Stay:  Expected LOS: 3  Actual LOS: 1      Tracy Snow LPN

## 2024-01-26 NOTE — PROGRESS NOTES
Cardiology Progress Note  1/26/2024     Admit Date: 1/23/2024  Admit Diagnosis: Pleural effusion [J90]  History of pulmonary embolism [Z86.711]  Acute cystitis without hematuria [N30.00]  Acute respiratory failure with hypoxia (HCC) [J96.01]  Pneumonia due to COVID-19 virus [U07.1, J12.82]  CC: none currently  Cardiologist:  Dr Lerner.   Cardiac Assessment/Plan:    1) NQWMI by trop 300s; Mild CHF on CXR  2) HTN  3) Chronic LBBB  4) Bilat PE 11/2023, on AC   5) Prior breast CA  6) ?covid at SNF: neg here  7) DNR/I:  Advanced dementia; minimally ambulatory per daughter.     Presenting with dyspnea & hypoxia; pt does not remember either these.  Needed BiPAP, now on nasal cannula.     Likely underlying CAD; with her comorbid illnesses, recommend Med Rx only & daughter is agreeable to no invasive procedures.  Echo for prognosis; gentle diuresis.  Hep gtt back to PO AC per primary team.  Add bblocker; asa.      1/25: Cont dementia; no current CP/dyspnea  -160s; HR 80-90s; sinus/PACs; 92%RA  K 3.4; Cr 0.8  Cardiac med: asa/eliquis; losartan 50; metoprolol 12.5 bid  Rec: metoprolol to XL; Add low dose aldactone.  Echo pending. No stress test/invasive cardiac testing: Med Rx.    Echo 1/25/24:    Left Ventricle: Normal left ventricular systolic function with a visually estimated EF of 55 - 60%. Left ventricle size is normal. Mildly increased wall thickness. Anteroseptal HK.    Right Ventricle: Right ventricle size is normal. Normal systolic function.    Aortic Valve: Mild to moderate regurgitation.    Mitral Valve: Moderate regurgitation. Mild to moderate stenosis noted.    Left Atrium: Left atrium is dilated.    1/26: Dementia; no complaints  -150; HR 60-70s; sinus; 94% RA  K 3.7; Cr 0.8; Hg 12.4.    Cardiac med: asa/eliquis; losartan 25; toprol XL 25. Aldactone 12.5    No further cardiac w/u warranted; No invasive procedures planned per family.  Pt remains DNR/I.    Will sign-off; We

## 2024-01-26 NOTE — PLAN OF CARE
Problem: Respiratory - Adult  Goal: Achieves optimal ventilation and oxygenation  1/26/2024 1225 by Yanely Aquino RN  Outcome: Progressing  1/26/2024 0911 by Yanely Aquino RN  Outcome: Progressing     Problem: Skin/Tissue Integrity  Goal: Absence of new skin breakdown  Description: 1.  Monitor for areas of redness and/or skin breakdown  2.  Assess vascular access sites hourly  3.  Every 4-6 hours minimum:  Change oxygen saturation probe site  4.  Every 4-6 hours:  If on nasal continuous positive airway pressure, respiratory therapy assess nares and determine need for appliance change or resting period.  1/26/2024 1225 by Yanely Aquino RN  Outcome: Progressing  1/26/2024 0911 by Yanely Aquino RN  Outcome: Progressing     Problem: Safety - Adult  Goal: Free from fall injury  1/26/2024 1225 by Yanely Aquino RN  Outcome: Progressing  1/26/2024 0911 by Yanely Aquino RN  Outcome: Progressing     Problem: Discharge Planning  Goal: Discharge to home or other facility with appropriate resources  1/26/2024 1225 by Yanely Aquino RN  Outcome: Progressing  1/26/2024 0911 by Yanely Aquino RN  Outcome: Progressing     Problem: Pain  Goal: Verbalizes/displays adequate comfort level or baseline comfort level  1/26/2024 1225 by Yanely Aquino RN  Outcome: Progressing  1/26/2024 0911 by Yanely Aquino RN  Outcome: Progressing

## 2024-01-26 NOTE — PROGRESS NOTES
Spiritual Care Partner Volunteer visited patient at [facility] in [facility] 3 MED TELE on 1/26/2024   Documented by:  GABRIELLA Clements

## 2024-01-26 NOTE — DISCHARGE INSTRUCTIONS
All of your medications from before your hospitalization are the same EXCEPT:  STOP taking seroquel. Your new prescription for you to start is aspirin, losartan, metoprolol, spironolactone, for your heart. You can take levaquin for your urine infection.  Please take all of your medications as prescribed. If prescribed any medications, please read all pharmacy instructions and inserts. Inform your doctor and pharmacist about all other medications and alternative therapies.  Please follow up with your PCP in 1-2 weeks to be reassessed after your hospital stay.  If you start feeling any symptoms similar to what brought you into the hospital, please come back to the ED to be re-evaluated.

## 2024-01-26 NOTE — DISCHARGE SUMMARY
Discharge Summary    Name: Jade Ahn  [id number]  YOB: 1940 (Age: 83 y.o.)   Date of Admission: 1/23/2024  Date of Discharge: 1/26/2024  Attending Physician: Kanchan Simon MD    Discharge Diagnosis:     Acute hypoxic respiratory failure requiring high oxygen supplementation  Sepsis likely due to UTI  Elevated lactic-normal  Leukocytosis  COVID-negative in ED, as per nursing home note COVID-positive   flu negative  Elevated troponin   EKG with left bundle branch block-no significant change from prior EKG  Possible type II MI   Hypertension  Hyponatremia- resolved  Hypokalemia -resolved  Alzheimer's dementia  Hydrocephalus    Consultations:  IP CONSULT TO HOSPITALIST  IP CONSULT TO CARDIOLOGY      Brief Admission History/Reason for Admission Per Loki Benjamin Jr., MD:     Jade Ahn is a 83 y.o. female, with significant pmhx of vascular dementia, left bundle branch block, hypertension, previous breast cancer, bilateral PEs on Eliquis who presents via EMS from [facility] to the ED with c/o respiratory distress. History obtained from daughter at bedside. Patient was diagnosed with COVID earlier today by her facility, however negative here in ED and had progressive worsening of her shortness of breath this evening.  Patient was satting in the 70s on room air when found by EMS.  Placed on a nonrebreather without much improvement and transitioned her to CPAP for transport to ED.  Noted to be satting in the 90s on arrival in the ED.  Does not wear oxygen at baseline.   Recently been discharged in November, 2023-treated for bilateral PE at that time.  Blood pressure was normal so losartan was continued to hold upon discharge.  We were asked to admit for work up and evaluation of the above problems.     Brief Hospital Course by Main Problems:     Acute hypoxic respiratory failure requiring high oxygen supplementation  Sepsis likely due to

## 2024-01-26 NOTE — PLAN OF CARE
Problem: Respiratory - Adult  Goal: Achieves optimal ventilation and oxygenation  1/26/2024 1534 by Yanely Aquino RN  Outcome: Adequate for Discharge  1/26/2024 1225 by Yanely Aquino RN  Outcome: Progressing  1/26/2024 0911 by Yanely Aquino RN  Outcome: Progressing     Problem: Skin/Tissue Integrity  Goal: Absence of new skin breakdown  Description: 1.  Monitor for areas of redness and/or skin breakdown  2.  Assess vascular access sites hourly  3.  Every 4-6 hours minimum:  Change oxygen saturation probe site  4.  Every 4-6 hours:  If on nasal continuous positive airway pressure, respiratory therapy assess nares and determine need for appliance change or resting period.  1/26/2024 1534 by Yanely Aquino RN  Outcome: Adequate for Discharge  1/26/2024 1225 by Yanely Aquino RN  Outcome: Progressing  1/26/2024 0911 by Yanely Aquino RN  Outcome: Progressing     Problem: Safety - Adult  Goal: Free from fall injury  1/26/2024 1534 by Yanely Aquino RN  Outcome: Adequate for Discharge  1/26/2024 1225 by Yanely Aquino RN  Outcome: Progressing  1/26/2024 0911 by Yanely Aquino RN  Outcome: Progressing     Problem: Discharge Planning  Goal: Discharge to home or other facility with appropriate resources  1/26/2024 1534 by Yanely Aquino RN  Outcome: Adequate for Discharge  1/26/2024 1225 by Yanely Aquino RN  Outcome: Progressing  1/26/2024 0911 by Yanely Aquino RN  Outcome: Progressing     Problem: Pain  Goal: Verbalizes/displays adequate comfort level or baseline comfort level  1/26/2024 1534 by Yanely Aquino RN  Outcome: Adequate for Discharge  1/26/2024 1225 by Yanely Aquino RN  Outcome: Progressing  1/26/2024 0911 by Yanely Aquino RN  Outcome: Progressing     Problem: Respiratory - Adult  Goal: Achieves optimal ventilation and oxygenation  1/26/2024 1534 by Yanely Aquino RN  Outcome: Adequate for Discharge  1/26/2024 1225 by Yanely Aquino RN  Outcome: Progressing  1/26/2024 0911 by Yanely Aquino, RN  Outcome: Progressing    on nasal continuous positive airway pressure, respiratory therapy assess nares and determine need for appliance change or resting period.  1/26/2024 1534 by Yanely Aquino RN  Outcome: Adequate for Discharge  1/26/2024 1225 by Yanely Aquino RN  Outcome: Progressing  1/26/2024 0911 by Yanely Aquino RN  Outcome: Progressing     Problem: Safety - Adult  Goal: Free from fall injury  1/26/2024 1534 by Yanely Aquino RN  Outcome: Adequate for Discharge  1/26/2024 1225 by Yanely Aquino RN  Outcome: Progressing  1/26/2024 0911 by Yanely Aquino RN  Outcome: Progressing     Problem: Discharge Planning  Goal: Discharge to home or other facility with appropriate resources  1/26/2024 1534 by Yanely Aquino RN  Outcome: Adequate for Discharge  1/26/2024 1225 by Yanely Aquino RN  Outcome: Progressing  1/26/2024 0911 by Yanely Aquino RN  Outcome: Progressing     Problem: Pain  Goal: Verbalizes/displays adequate comfort level or baseline comfort level  1/26/2024 1534 by Yanely Aquino RN  Outcome: Adequate for Discharge  1/26/2024 1225 by Yanely Aquino RN  Outcome: Progressing  1/26/2024 0911 by Yanely Aquino RN  Outcome: Progressing

## 2024-01-26 NOTE — CARE COORDINATION
Transition of Care Plan to SNF/Rehab    Communication to Patient/Family:  Met with patient and family and they are agreeable to the transition plan. The Plan for Transition of Care is related to the following treatment goals: LTC  at [facility]    The Patient and/or patient representative was provided with a choice of provider and agrees  with the discharge plan.      Yes [x] No []    A Freedom of choice list was provided with basic dialogue that supports the patient's individualized plan of care/goals and shares the quality data associated with the providers.       Yes [x] No []    SNF/Rehab Transition:  Patient has been accepted to [facility] SNF/Rehab and meets criteria for admission.   Patient will transported by [place] and expected to leave at 3 pm    Communication to SNF/Rehab:  Bedside RN, Yanely, has been notified to update the transition plan to the facility and call report (phone number).  Discharge information has been updated on the AVS. And communicated to facility via [employer], or CC link.     CC link opened to faciity    Nursing Please include all hard scripts for controlled substances, med rec and dc summary, and AVS in packet.     Reviewed and confirmed with facility, Jennifer , can manage the patient care needs for the following:     Shyam with (X) only those applicable:  Medication:  [x]Medications are available at the facility  []IV Antibiotics    []Controlled Substance - hard copies available sent.  []Weekly Labs    Equipment:  []CPAP/BiPAP  []Wound Vacuum  []Wood or Urinary Device  []PICC/Central Line  []Nebulizer  []Ventilator    Treatment:  []Isolation (for MRSA, VRE, etc.)  []Surgical Drain Management  []Tracheostomy Care  []Dressing Changes  []Dialysis with transportation  []PEG Care  []Oxygen  []Daily Weights for Heart Failure    Dietary:  []Any diet limitations  []Tube Feedings   []Total Parenteral Management (TPN)    Financial Resources:  []Medicaid Application

## 2024-01-26 NOTE — PROGRESS NOTES
Patient is discharged and transfer back  to the [facility] with [facility] service  , IV d/c.  Handoff report given to the [facility] nurse. Patient is ox1 . Discharge paper given to transport person .

## 2024-01-28 LAB
BACTERIA SPEC CULT: NORMAL
BACTERIA SPEC CULT: NORMAL
SERVICE CMNT-IMP: NORMAL
SERVICE CMNT-IMP: NORMAL

## 2024-04-22 ENCOUNTER — OFFICE VISIT (OUTPATIENT)
Age: 84
End: 2024-04-22
Payer: MEDICAID

## 2024-04-22 VITALS
HEIGHT: 72 IN | RESPIRATION RATE: 16 BRPM | SYSTOLIC BLOOD PRESSURE: 120 MMHG | HEART RATE: 64 BPM | BODY MASS INDEX: 24.52 KG/M2 | OXYGEN SATURATION: 97 % | WEIGHT: 181 LBS | DIASTOLIC BLOOD PRESSURE: 60 MMHG

## 2024-04-22 DIAGNOSIS — Z09 HOSPITAL DISCHARGE FOLLOW-UP: ICD-10-CM

## 2024-04-22 DIAGNOSIS — F02.80 ALZHEIMER'S DEMENTIA WITHOUT BEHAVIORAL DISTURBANCE (HCC): ICD-10-CM

## 2024-04-22 DIAGNOSIS — I10 HYPERTENSION, ESSENTIAL: ICD-10-CM

## 2024-04-22 DIAGNOSIS — I44.7 LBBB (LEFT BUNDLE BRANCH BLOCK): ICD-10-CM

## 2024-04-22 DIAGNOSIS — I21.A1 TYPE 2 MI (MYOCARDIAL INFARCTION) (HCC): Primary | ICD-10-CM

## 2024-04-22 DIAGNOSIS — G30.9 ALZHEIMER'S DEMENTIA WITHOUT BEHAVIORAL DISTURBANCE (HCC): ICD-10-CM

## 2024-04-22 DIAGNOSIS — I26.99 BILATERAL PULMONARY EMBOLISM (HCC): ICD-10-CM

## 2024-04-22 PROCEDURE — 3078F DIAST BP <80 MM HG: CPT | Performed by: SPECIALIST

## 2024-04-22 PROCEDURE — 1111F DSCHRG MED/CURRENT MED MERGE: CPT | Performed by: SPECIALIST

## 2024-04-22 PROCEDURE — 99204 OFFICE O/P NEW MOD 45 MIN: CPT | Performed by: SPECIALIST

## 2024-04-22 PROCEDURE — 3074F SYST BP LT 130 MM HG: CPT | Performed by: SPECIALIST

## 2024-04-22 PROCEDURE — 99214 OFFICE O/P EST MOD 30 MIN: CPT | Performed by: SPECIALIST

## 2024-04-22 NOTE — PROGRESS NOTES
Jade Ahn     1940       Jorge Lerner MD, [facility]  Date of Visit-4/22/2024   PCP is No primary care provider on file.   [facility] [place]  [facility]  HPI:  Jade Ahn is a 83 y.o. female     Hospital follow-up ,new consult refer post hospital dc  Seen in 2020 in our office  The troponins were 300s ,mild CHF on chest x-ray January 2024 ,seen Cards Dr. Masterson at [facility].  Records, dc summaries of both admits are reviewed   Hospitalized 1/23/2024 respiratory distress not clear if COVID , had sepsis due to UTI, elevated troponin, type II MI ,EF 55 to 60% ,placed on metoprolol losartan, no invasive procedures Alzheimer's dementia  Left bundle branch block  Hypertension  Breast cancer  Prior PEs patient hospitalized November 2023 with chest pain in the middle of the chest with shortness of breath.  CT showed bilateral pulmonary emboli.  NPH  She has been a hospice patient in 2021-notes noted.    Patient previously seen in 2019 and 2020 for near syncope and aortic  sclerosis.  At that time we had stopped her Hyzaar.  She had a systolic murmur in the past but we could not appreciate on follow-up.  Echocardiogram showed aortic sclerosis.    She had hypertension managed on losartan and a left bundle branch block which was persistent.    She has known history of Alzheimer's dementia /NPH with shunt and was PRN for clinic in July 2020.    She now lives at [facility] and is accompanied today by her daughter in follow-up of her hospitalizations.    The patient is a limited historian but can answer basic questions and denies chest pain, shortness of breath.    She has minimal edema on the right leg after previous right DVT.  Left side shows no edema.    Since she has been back to the facility she has had no difficulty reported.    Encounter Date: 01/23/24   EKG 12 Lead   Result Value    Ventricular Rate 87    Atrial Rate 87    P-R Interval 212    QRS